# Patient Record
Sex: FEMALE | Race: BLACK OR AFRICAN AMERICAN | Employment: OTHER | URBAN - METROPOLITAN AREA
[De-identification: names, ages, dates, MRNs, and addresses within clinical notes are randomized per-mention and may not be internally consistent; named-entity substitution may affect disease eponyms.]

---

## 2018-07-09 ENCOUNTER — OFFICE VISIT (OUTPATIENT)
Dept: FAMILY MEDICINE CLINIC | Age: 78
End: 2018-07-09

## 2018-07-09 VITALS
HEART RATE: 76 BPM | RESPIRATION RATE: 18 BRPM | SYSTOLIC BLOOD PRESSURE: 122 MMHG | DIASTOLIC BLOOD PRESSURE: 84 MMHG | HEIGHT: 61 IN | OXYGEN SATURATION: 98 % | BODY MASS INDEX: 35.8 KG/M2 | TEMPERATURE: 98.2 F | WEIGHT: 189.6 LBS

## 2018-07-09 DIAGNOSIS — F32.9 MAJOR DEPRESSIVE DISORDER WITH SINGLE EPISODE, REMISSION STATUS UNSPECIFIED: ICD-10-CM

## 2018-07-09 DIAGNOSIS — I10 ESSENTIAL HYPERTENSION: ICD-10-CM

## 2018-07-09 DIAGNOSIS — R60.0 BILATERAL LEG EDEMA: ICD-10-CM

## 2018-07-09 DIAGNOSIS — T43.505A NEUROLEPTIC-INDUCED TARDIVE DYSKINESIA: ICD-10-CM

## 2018-07-09 DIAGNOSIS — M47.816 OSTEOARTHRITIS OF LUMBAR SPINE, UNSPECIFIED SPINAL OSTEOARTHRITIS COMPLICATION STATUS: ICD-10-CM

## 2018-07-09 DIAGNOSIS — G24.01 NEUROLEPTIC-INDUCED TARDIVE DYSKINESIA: ICD-10-CM

## 2018-07-09 DIAGNOSIS — K21.9 GASTROESOPHAGEAL REFLUX DISEASE WITHOUT ESOPHAGITIS: ICD-10-CM

## 2018-07-09 DIAGNOSIS — E11.9 TYPE 2 DIABETES MELLITUS WITHOUT COMPLICATION, WITHOUT LONG-TERM CURRENT USE OF INSULIN (HCC): Primary | ICD-10-CM

## 2018-07-09 LAB — HBA1C MFR BLD HPLC: 7.4 %

## 2018-07-09 RX ORDER — ASPIRIN 81 MG/1
TABLET ORAL DAILY
COMMUNITY
End: 2022-03-17

## 2018-07-09 RX ORDER — OMEGA-3-ACID ETHYL ESTERS 1 G/1
2 CAPSULE, LIQUID FILLED ORAL 2 TIMES DAILY
COMMUNITY
End: 2019-02-06 | Stop reason: SDUPTHER

## 2018-07-09 RX ORDER — FELODIPINE 5 MG/1
5 TABLET, EXTENDED RELEASE ORAL DAILY
COMMUNITY
End: 2018-08-08 | Stop reason: SDUPTHER

## 2018-07-09 RX ORDER — OMEPRAZOLE 20 MG/1
20 CAPSULE, DELAYED RELEASE ORAL DAILY
COMMUNITY
End: 2018-08-08 | Stop reason: SDUPTHER

## 2018-07-09 RX ORDER — METFORMIN HYDROCHLORIDE 500 MG/1
TABLET ORAL 2 TIMES DAILY WITH MEALS
COMMUNITY
End: 2018-08-08 | Stop reason: SDUPTHER

## 2018-07-09 RX ORDER — OLANZAPINE 5 MG/1
5 TABLET ORAL 2 TIMES DAILY
COMMUNITY

## 2018-07-09 RX ORDER — NADOLOL 40 MG/1
TABLET ORAL DAILY
COMMUNITY
End: 2018-08-08 | Stop reason: SDUPTHER

## 2018-07-09 RX ORDER — LOSARTAN POTASSIUM 100 MG/1
100 TABLET ORAL DAILY
COMMUNITY
End: 2018-08-08 | Stop reason: SDUPTHER

## 2018-07-09 RX ORDER — VALACYCLOVIR HYDROCHLORIDE 500 MG/1
TABLET, FILM COATED ORAL 2 TIMES DAILY
COMMUNITY
End: 2018-08-08 | Stop reason: SDUPTHER

## 2018-07-09 RX ORDER — PAROXETINE HYDROCHLORIDE 40 MG/1
40 TABLET, FILM COATED ORAL 2 TIMES DAILY
COMMUNITY
End: 2018-11-05

## 2018-07-09 NOTE — PATIENT INSTRUCTIONS
Continue current medications. Further disposition pending lab results if indicated. Elevate the legs above horizontal as often as possible. Avoid salt and prolonged sitting and standing. Consider support hose or compression stockings. Return for 30 minute follow up in 2 weeks, sooner with any problems. Sign release for medical records from [facility]CP and [facility] Well Visit, Over 72: Care Instructions Your Care Instructions Physical exams can help you stay healthy. Your doctor has checked your overall health and may have suggested ways to take good care of yourself. He or she also may have recommended tests. At home, you can help prevent illness with healthy eating, regular exercise, and other steps. Follow-up care is a key part of your treatment and safety. Be sure to make and go to all appointments, and call your doctor if you are having problems. It's also a good idea to know your test results and keep a list of the medicines you take. How can you care for yourself at home? · Reach and stay at a healthy weight. This will lower your risk for many problems, such as obesity, diabetes, heart disease, and high blood pressure. · Get at least 30 minutes of exercise on most days of the week. Walking is a good choice. You also may want to do other activities, such as running, swimming, cycling, or playing tennis or team sports. · Do not smoke. Smoking can make health problems worse. If you need help quitting, talk to your doctor about stop-smoking programs and medicines. These can increase your chances of quitting for good. · Protect your skin from too much sun. When you're outdoors from 10 a.m. to 4 p.m., stay in the shade or cover up with clothing and a hat with a wide brim. Wear sunglasses that block UV rays. Even when it's cloudy, put broad-spectrum sunscreen (SPF 30 or higher) on any exposed skin. · See a dentist one or two times a year for checkups and to have your teeth cleaned.  
· Wear a seat belt in the car. · Limit alcohol to 2 drinks a day for men and 1 drink a day for women. Too much alcohol can cause health problems. Follow your doctor's advice about when to have certain tests. These tests can spot problems early. For men and women · Cholesterol. Your doctor will tell you how often to have this done based on your overall health and other things that can increase your risk for heart attack and stroke. · Blood pressure. Have your blood pressure checked during a routine doctor visit. Your doctor will tell you how often to check your blood pressure based on your age, your blood pressure results, and other factors. · Diabetes. Ask your doctor whether you should have tests for diabetes. · Vision. Experts recommend that you have yearly exams for glaucoma and other age-related eye problems. · Hearing. Tell your doctor if you notice any change in your hearing. You can have tests to find out how well you hear. · Colon cancer tests. Keep having colon cancer tests as your doctor recommends. You can have one of several types of tests. · Heart attack and stroke risk. At least every 4 to 6 years, you should have your risk for heart attack and stroke assessed. Your doctor uses factors such as your age, blood pressure, cholesterol, and whether you smoke or have diabetes to show what your risk for a heart attack or stroke is over the next 10 years. · Osteoporosis. Talk to your doctor about whether you should have a bone density test to find out whether you have thinning bones. Also ask your doctor about whether you should take calcium and vitamin D supplements. For women · Pap test and pelvic exam. You may no longer need a Pap test. Talk with your doctor about whether to stop or continue to have Pap tests. · Breast exam and mammogram. Ask how often you should have a mammogram, which is an X-ray of your breasts.  A mammogram can spot breast cancer before it can be felt and when it is easiest to treat. 
· Thyroid disease. Talk to your doctor about whether to have your thyroid checked as part of a regular physical exam. Women have an increased chance of a thyroid problem. For men · Prostate exam. Talk to your doctor about whether you should have a blood test (called a PSA test) for prostate cancer. Experts disagree on whether men should have this test. Some experts recommend that you discuss the benefits and risks of the test with your doctor. · Abdominal aortic aneurysm. Ask your doctor whether you should have a test to check for an aneurysm. You may need a test if you ever smoked or if your parent, brother, sister, or child has had an aneurysm. When should you call for help? Watch closely for changes in your health, and be sure to contact your doctor if you have any problems or symptoms that concern you. Where can you learn more? Go to http://levy-tasha.info/. Enter V874 in the search box to learn more about \"Well Visit, Over 65: Care Instructions. \" Current as of: May 12, 2017 Content Version: 11.4 © 9756-4947 [employer]. Care instructions adapted under license by [employer] (which disclaims liability or warranty for this information). If you have questions about a medical condition or this instruction, always ask your healthcare professional. [address] any warranty or liability for your use of this information. Low Sodium Diet (2,000 Milligram): Care Instructions Your Care Instructions Too much sodium causes your body to hold on to extra water. This can raise your blood pressure and force your heart and kidneys to work harder. In very serious cases, this could cause you to be put in the hospital. It might even be life-threatening. By limiting sodium, you will feel better and lower your risk of serious problems. The most common source of sodium is salt.  People get most of the salt in their diet from canned, prepared, and packaged foods. Fast food and restaurant meals also are very high in sodium. Your doctor will probably limit your sodium to less than 2,000 milligrams (mg) a day. This limit counts all the sodium in prepared and packaged foods and any salt you add to your food. Follow-up care is a key part of your treatment and safety. Be sure to make and go to all appointments, and call your doctor if you are having problems. It's also a good idea to know your test results and keep a list of the medicines you take. How can you care for yourself at home? Read food labels · Read labels on cans and food packages. The labels tell you how much sodium is in each serving. Make sure that you look at the serving size. If you eat more than the serving size, you have eaten more sodium. · Food labels also tell you the Percent Daily Value for sodium. Choose products with low Percent Daily Values for sodium. · Be aware that sodium can come in forms other than salt, including monosodium glutamate (MSG), sodium citrate, and sodium bicarbonate (baking soda). MSG is often added to Asian food. When you eat out, you can sometimes ask for food without MSG or added salt. Buy low-sodium foods · Buy foods that are labeled \"unsalted\" (no salt added), \"sodium-free\" (less than 5 mg of sodium per serving), or \"low-sodium\" (less than 140 mg of sodium per serving). Foods labeled \"reduced-sodium\" and \"light sodium\" may still have too much sodium. Be sure to read the label to see how much sodium you are getting. · Buy fresh vegetables, or frozen vegetables without added sauces. Buy low-sodium versions of canned vegetables, soups, and other canned goods. Prepare low-sodium meals · Cut back on the amount of salt you use in cooking. This will help you adjust to the taste. Do not add salt after cooking. One teaspoon of salt has about 2,300 mg of sodium. · Take the salt shaker off the table.  
· Flavor your food with garlic, lemon juice, onion, vinegar, herbs, and spices. Do not use soy sauce, lite soy sauce, steak sauce, onion salt, garlic salt, celery salt, mustard, or ketchup on your food. · Use low-sodium salad dressings, sauces, and ketchup. Or make your own salad dressings and sauces without adding salt. · Use less salt (or none) when recipes call for it. You can often use half the salt a recipe calls for without losing flavor. Other foods such as rice, pasta, and grains do not need added salt. · Rinse canned vegetables, and cook them in fresh water. This removes some-but not all-of the salt. · Avoid water that is naturally high in sodium or that has been treated with water softeners, which add sodium. Call your local water company to find out the sodium content of your water supply. If you buy bottled water, read the label and choose a sodium-free brand. Avoid high-sodium foods · Avoid eating: ¨ Smoked, cured, salted, and canned meat, fish, and poultry. ¨ Ham, guo, hot dogs, and luncheon meats. ¨ Regular, hard, and processed cheese and regular peanut butter. ¨ Crackers with salted tops, and other salted snack foods such as pretzels, chips, and salted popcorn. ¨ Frozen prepared meals, unless labeled low-sodium. ¨ Canned and dried soups, broths, and bouillon, unless labeled sodium-free or low-sodium. ¨ Canned vegetables, unless labeled sodium-free or low-sodium. ¨ Western Sary fries, pizza, tacos, and other fast foods. ¨ Pickles, olives, ketchup, and other condiments, especially soy sauce, unless labeled sodium-free or low-sodium. Where can you learn more? Go to http://levy-tasha.info/. Enter F420 in the search box to learn more about \"Low Sodium Diet (2,000 Milligram): Care Instructions. \" Current as of: May 12, 2017 Content Version: 11.4 © 3992-6795 [employer]. Care instructions adapted under license by [employer] (which disclaims liability or warranty for this information).  If you have questions about a medical condition or this instruction, always ask your healthcare professional. [address] any warranty or liability for your use of this information. Leg and Ankle Edema: Care Instructions Your Care Instructions Swelling in the legs, ankles, and feet is called edema. It is common after you sit or stand for a while. Long plane flights or car rides often cause swelling in the legs and feet. You may also have swelling if you have to stand for long periods of time at your job. Problems with the veins in the legs (varicose veins) and changes in hormones can also cause swelling. Sometimes the swelling in the ankles and feet is caused by a more serious problem, such as heart failure, infection, blood clots, or liver or kidney disease. Follow-up care is a key part of your treatment and safety. Be sure to make and go to all appointments, and call your doctor if you are having problems. It's also a good idea to know your test results and keep a list of the medicines you take. How can you care for yourself at home? · If your doctor gave you medicine, take it as prescribed. Call your doctor if you think you are having a problem with your medicine. · Whenever you are resting, raise your legs up. Try to keep the swollen area higher than the level of your heart. · Take breaks from standing or sitting in one position. ¨ Walk around to increase the blood flow in your lower legs. ¨ Move your feet and ankles often while you stand, or tighten and relax your leg muscles. · Wear support stockings. Put them on in the morning, before swelling gets worse. · Eat a balanced diet. Lose weight if you need to. · Limit the amount of salt (sodium) in your diet. Salt holds fluid in the body and may increase swelling. When should you call for help? Call 911 anytime you think you may need emergency care.  For example, call if: 
? · You have symptoms of a blood clot in your lung (called a pulmonary embolism). These may include: 
¨ Sudden chest pain. ¨ Trouble breathing. ¨ Coughing up blood. ?Call your doctor now or seek immediate medical care if: 
? · You have signs of a blood clot, such as: 
¨ Pain in your calf, back of the knee, thigh, or groin. ¨ Redness and swelling in your leg or groin. ? · You have symptoms of infection, such as: 
¨ Increased pain, swelling, warmth, or redness. ¨ Red streaks or pus. ¨ A fever. ? Watch closely for changes in your health, and be sure to contact your doctor if: 
? · Your swelling is getting worse. ? · You have new or worsening pain in your legs. ? · You do not get better as expected. Where can you learn more? Go to http://levy-tasha.info/. Enter Z170 in the search box to learn more about \"Leg and Ankle Edema: Care Instructions. \" Current as of: March 20, 2017 Content Version: 11.4 © 0415-8405 [employer]. Care instructions adapted under license by [facility] (which disclaims liability or warranty for this information). If you have questions about a medical condition or this instruction, always ask your healthcare professional. [place] [address] any warranty or liability for your use of this information.

## 2018-07-09 NOTE — PROGRESS NOTES
HISTORY OF PRESENT ILLNESS  Ron Collazo is a 69 y.o. female. Establish Care   The history is provided by the patient. Pertinent negatives include no chest pain, no abdominal pain, no headaches and no shortness of breath. Past Medical History:   Diagnosis Date    Delusion ([facility] [address]     Depression     Diabetes ([facility] [address]     type 2    GERD (gastroesophageal reflux disease)     Hypertension        Past Surgical History:   Procedure Laterality Date    HX BACK SURGERY      lower back surgery, done last year    HX  SECTION      HX TONSILLECTOMY         Family History   Problem Relation Age of Onset    Hypertension Father     Cancer Neg Hx     Diabetes Neg Hx        History   Smoking Status    Former Smoker   Smokeless Tobacco    Former User     Comment: quit 34 years ago     History   Alcohol Use No     Health Maintenance Review:  Colonoscopy - 3-4 years ago  Mammogram - 2018, normal  Pap Smear - N/A  Dexa Scan -  Glaucoma Screen - 2018    Immunizations:  Tetanus - ? Pneumococcal - Reported current   PCV-13 -   PCV-23 -  Influenza - N/A  HZV - Declines        Review of Systems   Constitutional: Negative for chills, fever and weight loss. HENT: Negative for hearing loss. Eyes: Negative for blurred vision and double vision. Wears corrective lenses   Respiratory: Negative for cough, shortness of breath and wheezing. Cardiovascular: Positive for leg swelling (3-4 weeks). Negative for chest pain, palpitations, orthopnea and PND. Gastrointestinal: Negative for abdominal pain, blood in stool, constipation, diarrhea, heartburn, melena, nausea and vomiting. Esophageal rupture during dilatation   Genitourinary: Positive for frequency. Negative for dysuria, flank pain, hematuria and urgency. Musculoskeletal: Positive for back pain (chronic LBP - sees Dr. Johnnie Hodgkins, [facility] pain medication). Negative for joint pain and myalgias. Skin: Negative for itching and rash.    Neurological: Negative for dizziness, tingling, sensory change, focal weakness and headaches. MARGRET on CPAP   Endo/Heme/Allergies: Negative for environmental allergies. Psychiatric/Behavioral: Positive for depression (doing well on current meds). Negative for suicidal ideas. The patient is not nervous/anxious and does not have insomnia. Visit Vitals    /84 (BP 1 Location: Left arm, BP Patient Position: Sitting)    Pulse 76    Temp 98.2 °F (36.8 °C) (Oral)    Resp 18    Ht 5' 1.1\" (1.552 m)    Wt 189 lb 9.6 oz (86 kg)    SpO2 98%    BMI 35.71 kg/m2       Physical Exam   Constitutional: She is oriented to person, place, and time. She appears well-developed and well-nourished. HENT:   Head: Normocephalic. Right Ear: Tympanic membrane and ear canal normal.   Left Ear: Tympanic membrane and ear canal normal.   Mouth/Throat: Oropharynx is clear and moist.   Eyes: Conjunctivae and EOM are normal. Pupils are equal, round, and reactive to light. Neck: Neck supple. Cardiovascular: Normal rate, regular rhythm, normal heart sounds and intact distal pulses. Pulmonary/Chest: Effort normal and breath sounds normal.   Abdominal: Soft. Bowel sounds are normal. She exhibits no mass. There is no tenderness. Musculoskeletal: She exhibits edema (1+ pitting edema, bilateral distal LE, R>L). Neurological: She is alert and oriented to person, place, and time. She has normal reflexes. Skin: Skin is warm and dry. Psychiatric: She has a normal mood and affect. Her behavior is normal.   Nursing note and vitals reviewed.          Diabetic foot exam performed by Esperanza Reyes MD     Measurement  Response Nurse Comment Physician Comment   Monofilament  R - normal sensation with micro filament  L - normal sensation with micro filament     Pulse DP R - 2+ (normal)  L - 2+ (normal)     Pulse TP R - 2+ (normal)  L - 2+ (normal)     Structural deformity R - None  L - None     Skin Integrity / Deformity R - None  L - None 7/9/2018  9:42 AM  Component Results   Component Value Flag Ref Range Units Status   Hemoglobin A1c (POC) 7.4   % Final         Depression/Tardive Dyskinesia followed by psychiatry  Chronic lumbar pain followed by pain management  ASSESSMENT and PLAN    ICD-10-CM ICD-9-CM    1. Type 2 diabetes mellitus without complication, without long-term current use of insulin (HCC) E11.9 250.00 AMB POC HEMOGLOBIN A1C      HM DIABETES FOOT EXAM      CANCELED: MICROALBUMIN, UR, RAND W/ MICROALB/CREAT RATIO   2. Essential hypertension I10 401.9 CANCELED: LIPID PANEL      CANCELED: METABOLIC PANEL, COMPREHENSIVE   3. Bilateral leg edema R60.0 782.3 CANCELED: TSH 3RD GENERATION      CANCELED: CBC WITH AUTOMATED DIFF   4. Gastroesophageal reflux disease without esophagitis K21.9 530.81    5. Major depressive disorder with single episode, remission status unspecified F32.9 296.20    6. 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 333.85     T43.505A E939.3    7. Osteoarthritis of lumbar spine, unspecified spinal osteoarthritis complication status F31.139 721.3    Hypertension well controlled  Type 2 diabetes adequately controlled  Avoid dietary starch and sugar and follow a program of regular aerobic exercise. Continue current medications. Further disposition pending lab results if indicated. Elevate the legs above horizontal as often as possible. Avoid salt and prolonged sitting and standing. Consider support hose or compression stockings. Return for 30 minute follow up in 2 weeks, sooner with any problems.   Sign release for medical records from [facility]CP [facility]

## 2018-07-09 NOTE — PROGRESS NOTES
Aleksey Bright is a 69 y.o. female here to establish care       Aleksey Bright is a 69 y.o. female (: 1940) presenting to address:    Chief Complaint   Patient presents with   [facility].     pt here to establish care. pt reports swelling of both feet for 3-4 weeks        Vitals:    18 0917   BP: 122/84   Pulse: 76   Resp: 18   SpO2: 98%   Weight: 189 lb 9.6 oz (86 kg)   Height: 5' 1.1\" (1.552 m)   PainSc:   3   PainLoc: Foot       Hearing/Vision:   No exam data present    Learning Assessment:     Learning Assessment 2018   PRIMARY LEARNER Patient   HIGHEST LEVEL OF EDUCATION - PRIMARY LEARNER  4 YEARS OF COLLEGE   BARRIERS PRIMARY LEARNER NONE   CO-LEARNER CAREGIVER No   PRIMARY LANGUAGE ENGLISH   LEARNER PREFERENCE PRIMARY READING   ANSWERED BY patient   RELATIONSHIP SELF     Depression Screening:     PHQ over the last two weeks 2018   Little interest or pleasure in doing things Not at all   Feeling down, depressed or hopeless Not at all   Total Score PHQ 2 0     Fall Risk Assessment:     Fall Risk Assessment, last 12 mths 2018   Able to walk? Yes   Fall in past 12 months? No     Abuse Screening:   No flowsheet data found. Coordination of Care Questionaire:   1. Have you been to the ER, urgent care clinic since your last visit? Hospitalized since your last visit? NO    2. Have you seen or consulted any other health care providers outside of the [address] [place] [address] since your last visit? Include any pap smears or colon screening. NO    Advanced Directive:   1. Do you have an Advanced Directive? NO    2. Would you like information on Advanced Directives?  NO

## 2018-07-09 NOTE — MR AVS SNAPSHOT
[address] [place] [address] [id number] [facility] [id number] [phone] Patient: Zarina Whaley MRN: [id number] LZL:[id number] Visit Information Date & Time Provider Department Dept. Phone Encounter #  
 7/9/2018  9:00 AM Isaura Jay, [employer] [phone] [id number] Follow-up Instructions Return in about 2 weeks (around 7/23/2018) for 30 minute follow up appointmen. Upcoming Health Maintenance Date Due DTaP/Tdap/Td series (1 - Tdap) 9/17/1962 ZOSTER VACCINE AGE 60> 7/17/2001 GLAUCOMA SCREENING Q2Y 9/17/2006 Bone Densitometry (Dexa) Screening 9/17/2006 Pneumococcal 65+ Low/Medium Risk (1 of 2 - PCV13) 9/17/2006 MEDICARE YEARLY EXAM 7/6/2018 Influenza Age 5 to Adult 8/1/2018 Allergies as of 7/9/2018  Review Complete On: 7/9/2018 By: Isaura Jay MD  
 No Known Allergies Current Immunizations  Never Reviewed No immunizations on file. Not reviewed this visit You Were Diagnosed With   
  
 Codes Comments Type 2 diabetes mellitus without complication, without long-term current use of insulin ([facility])    -  Primary ICD-10-CM: E11.9 ICD-9-CM: 250.00 Essential hypertension     ICD-10-CM: I10 
ICD-9-CM: 401.9 Bilateral leg edema     ICD-10-CM: R60.0 ICD-9-CM: 782.3 Gastroesophageal reflux disease without esophagitis     ICD-10-CM: K21.9 ICD-9-CM: 530.81 Major depressive disorder with single episode, remission status unspecified     ICD-10-CM: F32.9 ICD-9-CM: 296.20 Neuroleptic-induced tardive dyskinesia     ICD-10-CM: G24.01, T43.505A ICD-9-CM: 333.85, E939.3 Vitals BP Pulse Temp Resp Height(growth percentile) Weight(growth percentile) 122/84 (BP 1 Location: Left arm, BP Patient Position: Sitting) 76 98.2 °F (36.8 °C) (Oral) 18 5' 1.1\" (1.552 m) 189 lb 9.6 oz (86 kg) SpO2 BMI OB Status Smoking Status 98% 35.71 kg/m2 Postmenopausal Former Smoker Vitals History BMI and BSA Data Body Mass Index Body Surface Area 35.71 kg/m 2 1.93 m 2 Preferred Pharmacy Pharmacy Name Phone [facility], [place] [address] [phone] Your Updated Medication List  
  
   
This list is accurate as of 7/9/18  9:48 AM.  Always use your most recent med list.  
  
  
  
  
 aspirin delayed-release 81 mg tablet Take  by mouth daily. AUSTEDO 12 mg Tab Generic drug:  deutetrabenazine Take 12 mg by mouth two (2) times a day. felodipine 5 mg 24 hr tablet Commonly known as:  PLENDIL SR Take 5 mg by mouth daily. losartan 100 mg tablet Commonly known as:  COZAAR Take 100 mg by mouth daily. metFORMIN 500 mg tablet Commonly known as:  GLUCOPHAGE Take  by mouth two (2) times daily (with meals). nadolol 40 mg tablet Commonly known as:  CORGARD Take  by mouth daily. OLANZapine 5 mg tablet Commonly known as:  ZyPREXA Take  by mouth two (2) times a day. omega-3 acid ethyl esters 1 gram capsule Commonly known as:  [place] Take 2 g by mouth two (2) times a day. omeprazole 20 mg capsule Commonly known as:  PRILOSEC Take 20 mg by mouth daily. PARoxetine 40 mg tablet Commonly known as:  PAXIL Take 40 mg by mouth two (2) times a day. valACYclovir 500 mg tablet Commonly known as:  VALTREX Take  by mouth two (2) times a day. We Performed the Following AMB POC HEMOGLOBIN A1C [81875 CPT(R)] Follow-up Instructions Return in about 2 weeks (around 7/23/2018) for 30 minute follow up appointmen. To-Do List   
 07/09/2018 Lab:  CBC WITH AUTOMATED DIFF   
  
 07/09/2018 Lab:  LIPID PANEL   
  
 07/09/2018 Lab:  METABOLIC PANEL, COMPREHENSIVE   
  
 07/09/2018 Lab:  MICROALBUMIN, UR, RAND W/ MICROALB/CREAT RATIO   
  
 07/09/2018 Lab:  TSH 3RD GENERATION Patient Instructions Continue current medications. Further disposition pending lab results if indicated. Elevate the legs above horizontal as often as possible. Avoid salt and prolonged sitting and standing. Consider support hose or compression stockings. Return for 30 minute follow up in 2 weeks, sooner with any problems. Sign release for medical records from PCP and Eye Doctor Well Visit, Over 72: Care Instructions Your Care Instructions Physical exams can help you stay healthy. Your doctor has checked your overall health and may have suggested ways to take good care of yourself. He or she also may have recommended tests. At home, you can help prevent illness with healthy eating, regular exercise, and other steps. Follow-up care is a key part of your treatment and safety. Be sure to make and go to all appointments, and call your doctor if you are having problems. It's also a good idea to know your test results and keep a list of the medicines you take. How can you care for yourself at home? · Reach and stay at a healthy weight. This will lower your risk for many problems, such as obesity, diabetes, heart disease, and high blood pressure. · Get at least 30 minutes of exercise on most days of the week. Walking is a good choice. You also may want to do other activities, such as running, swimming, cycling, or playing tennis or team sports. · Do not smoke. Smoking can make health problems worse. If you need help quitting, talk to your doctor about stop-smoking programs and medicines. These can increase your chances of quitting for good. · Protect your skin from too much sun. When you're outdoors from 10 a.m. to 4 p.m., stay in the shade or cover up with clothing and a hat with a wide brim. Wear sunglasses that block UV rays. Even when it's cloudy, put broad-spectrum sunscreen (SPF 30 or higher) on any exposed skin. · See a dentist one or two times a year for checkups and to have your teeth cleaned. · Wear a seat belt in the car. · Limit alcohol to 2 drinks a day for men and 1 drink a day for women. Too much alcohol can cause health problems. Follow your doctor's advice about when to have certain tests. These tests can spot problems early. For men and women · Cholesterol. Your doctor will tell you how often to have this done based on your overall health and other things that can increase your risk for heart attack and stroke. · Blood pressure. Have your blood pressure checked during a routine doctor visit. Your doctor will tell you how often to check your blood pressure based on your age, your blood pressure results, and other factors. · Diabetes. Ask your doctor whether you should have tests for diabetes. · Vision. Experts recommend that you have yearly exams for glaucoma and other age-related eye problems. · Hearing. Tell your doctor if you notice any change in your hearing. You can have tests to find out how well you hear. · Colon cancer tests. Keep having colon cancer tests as your doctor recommends. You can have one of several types of tests. · Heart attack and stroke risk. At least every 4 to 6 years, you should have your risk for heart attack and stroke assessed. Your doctor uses factors such as your age, blood pressure, cholesterol, and whether you smoke or have diabetes to show what your risk for a heart attack or stroke is over the next 10 years. · Osteoporosis. Talk to your doctor about whether you should have a bone density test to find out whether you have thinning bones. Also ask your doctor about whether you should take calcium and vitamin D supplements. For women · Pap test and pelvic exam. You may no longer need a Pap test. Talk with your doctor about whether to stop or continue to have Pap tests.  
· Breast exam and mammogram. Ask how often you should have a mammogram, which is an X-ray of your breasts. A mammogram can spot breast cancer before it can be felt and when it is easiest to treat. · Thyroid disease. Talk to your doctor about whether to have your thyroid checked as part of a regular physical exam. Women have an increased chance of a thyroid problem. For men · Prostate exam. Talk to your doctor about whether you should have a blood test (called a PSA test) for prostate cancer. Experts disagree on whether men should have this test. Some experts recommend that you discuss the benefits and risks of the test with your doctor. · Abdominal aortic aneurysm. Ask your doctor whether you should have a test to check for an aneurysm. You may need a test if you ever smoked or if your parent, brother, sister, or child has had an aneurysm. When should you call for help? Watch closely for changes in your health, and be sure to contact your doctor if you have any problems or symptoms that concern you. Where can you learn more? Go to http://levy-tasha.info/. Enter M551 in the search box to learn more about \"Well Visit, Over 65: Care Instructions. \" Current as of: May 12, 2017 Content Version: 11.4 © 9484-3159 [employer]. Care instructions adapted under license by [employer] (which disclaims liability or warranty for this information). If you have questions about a medical condition or this instruction, always ask your healthcare professional. [place] [address] any warranty or liability for your use of this information. Low Sodium Diet (2,000 Milligram): Care Instructions Your Care Instructions Too much sodium causes your body to hold on to extra water. This can raise your blood pressure and force your heart and kidneys to work harder. In very serious cases, this could cause you to be put in the hospital. It might even be life-threatening.  By limiting sodium, you will feel better and lower your risk of serious problems. The most common source of sodium is salt. People get most of the salt in their diet from canned, prepared, and packaged foods. Fast food and restaurant meals also are very high in sodium. Your doctor will probably limit your sodium to less than 2,000 milligrams (mg) a day. This limit counts all the sodium in prepared and packaged foods and any salt you add to your food. Follow-up care is a key part of your treatment and safety. Be sure to make and go to all appointments, and call your doctor if you are having problems. It's also a good idea to know your test results and keep a list of the medicines you take. How can you care for yourself at home? Read food labels · Read labels on cans and food packages. The labels tell you how much sodium is in each serving. Make sure that you look at the serving size. If you eat more than the serving size, you have eaten more sodium. · Food labels also tell you the Percent Daily Value for sodium. Choose products with low Percent Daily Values for sodium. · Be aware that sodium can come in forms other than salt, including monosodium glutamate (MSG), sodium citrate, and sodium bicarbonate (baking soda). MSG is often added to Asian food. When you eat out, you can sometimes ask for food without MSG or added salt. Buy low-sodium foods · Buy foods that are labeled \"unsalted\" (no salt added), \"sodium-free\" (less than 5 mg of sodium per serving), or \"low-sodium\" (less than 140 mg of sodium per serving). Foods labeled \"reduced-sodium\" and \"light sodium\" may still have too much sodium. Be sure to read the label to see how much sodium you are getting. · Buy fresh vegetables, or frozen vegetables without added sauces. Buy low-sodium versions of canned vegetables, soups, and other canned goods. Prepare low-sodium meals · Cut back on the amount of salt you use in cooking.  This will help you adjust to the taste. Do not add salt after cooking. One teaspoon of salt has about 2,300 mg of sodium. · Take the salt shaker off the table. · Flavor your food with garlic, lemon juice, onion, vinegar, herbs, and spices. Do not use soy sauce, lite soy sauce, steak sauce, onion salt, garlic salt, celery salt, mustard, or ketchup on your food. · Use low-sodium salad dressings, sauces, and ketchup. Or make your own salad dressings and sauces without adding salt. · Use less salt (or none) when recipes call for it. You can often use half the salt a recipe calls for without losing flavor. Other foods such as rice, pasta, and grains do not need added salt. · Rinse canned vegetables, and cook them in fresh water. This removes some-but not all-of the salt. · Avoid water that is naturally high in sodium or that has been treated with water softeners, which add sodium. Call your local water company to find out the sodium content of your water supply. If you buy bottled water, read the label and choose a sodium-free brand. Avoid high-sodium foods · Avoid eating: ¨ Smoked, cured, salted, and canned meat, fish, and poultry. ¨ Ham, guo, hot dogs, and luncheon meats. ¨ Regular, hard, and processed cheese and regular peanut butter. ¨ Crackers with salted tops, and other salted snack foods such as pretzels, chips, and salted popcorn. ¨ Frozen prepared meals, unless labeled low-sodium. ¨ Canned and dried soups, broths, and bouillon, unless labeled sodium-free or low-sodium. ¨ Canned vegetables, unless labeled sodium-free or low-sodium. ¨ [place] fries, pizza, tacos, and other fast foods. ¨ Pickles, olives, ketchup, and other condiments, especially soy sauce, unless labeled sodium-free or low-sodium. Where can you learn more? Go to http://levy-tasha.info/. Enter A429 in the search box to learn more about \"Low Sodium Diet (2,000 Milligram): Care Instructions. \" Current as of: May 12, 2017 Content Version: 11.4 © 0716-8451 [employer]. Care instructions adapted under license by [employer] (which disclaims liability or warranty for this information). If you have questions about a medical condition or this instruction, always ask your healthcare professional. [address] any warranty or liability for your use of this information. Leg and Ankle Edema: Care Instructions Your Care Instructions Swelling in the legs, ankles, and feet is called edema. It is common after you sit or stand for a while. Long plane flights or car rides often cause swelling in the legs and feet. You may also have swelling if you have to stand for long periods of time at your job. Problems with the veins in the legs (varicose veins) and changes in hormones can also cause swelling. Sometimes the swelling in the ankles and feet is caused by a more serious problem, such as heart failure, infection, blood clots, or liver or kidney disease. Follow-up care is a key part of your treatment and safety. Be sure to make and go to all appointments, and call your doctor if you are having problems. It's also a good idea to know your test results and keep a list of the medicines you take. How can you care for yourself at home? · If your doctor gave you medicine, take it as prescribed. Call your doctor if you think you are having a problem with your medicine. · Whenever you are resting, raise your legs up. Try to keep the swollen area higher than the level of your heart. · Take breaks from standing or sitting in one position. ¨ Walk around to increase the blood flow in your lower legs. ¨ Move your feet and ankles often while you stand, or tighten and relax your leg muscles. · Wear support stockings. Put them on in the morning, before swelling gets worse. · Eat a balanced diet. Lose weight if you need to. · Limit the amount of salt (sodium) in your diet.  Salt holds fluid in the body and may increase swelling. When should you call for help? Call 911 anytime you think you may need emergency care. For example, call if: 
? · You have symptoms of a blood clot in your lung (called a pulmonary embolism). These may include: 
¨ Sudden chest pain. ¨ Trouble breathing. ¨ Coughing up blood. ?Call your doctor now or seek immediate medical care if: 
? · You have signs of a blood clot, such as: 
¨ Pain in your calf, back of the knee, thigh, or groin. ¨ Redness and swelling in your leg or groin. ? · You have symptoms of infection, such as: 
¨ Increased pain, swelling, warmth, or redness. ¨ Red streaks or pus. ¨ A fever. ? Watch closely for changes in your health, and be sure to contact your doctor if: 
? · Your swelling is getting worse. ? · You have new or worsening pain in your legs. ? · You do not get better as expected. Where can you learn more? Go to http://levy-tasha.info/. Enter S141 in the search box to learn more about \"Leg and Ankle Edema: Care Instructions. \" Current as of: March 20, 2017 Content Version: 11.4 © 4986-5718 [employer]. Care instructions adapted under license by [employer] (which disclaims liability or warranty for this information). If you have questions about a medical condition or this instruction, always ask your healthcare professional. [place] [address] any warranty or liability for your use of this information. Introducing [facility]! [facility] introduces MX Logic patient portal. Now you can access parts of your medical record, email your doctor's office, and request medication refills online. 1. In your internet browser, go to [email or web address]. [employer] 2. Click on the First Time User? Click Here link in the Sign In box. You will see the New Member Sign Up page. 3. Enter your MX Logic Access Code exactly as it appears below.  You will not need to use this code after youve completed the sign-up process. If you do not sign up before the expiration date, you must request a new code. · [employer] Access Code: [email or web address] Expires: 10/7/2018  9:48 AM 
 
4. Enter the last four digits of your Social Security Number (xxxx) and Date of Birth (mm/dd/yyyy) as indicated and click Submit. You will be taken to the next sign-up page. 5. Create a Room [employer] ID. This will be your [employer] login ID and cannot be changed, so think of one that is secure and easy to remember. 6. Create a [employer] password. You can change your password at any time. 7. Enter your Password Reset Question and Answer. This can be used at a later time if you forget your password. 8. Enter your e-mail address. You will receive e-mail notification when new information is available in [address]. 9. Click Sign Up. You can now view and download portions of your medical record. 10. Click the Download Summary menu link to download a portable copy of your medical information. If you have questions, please visit the Frequently Asked Questions section of the [employer] website. Remember, [employer] is NOT to be used for urgent needs. For medical emergencies, dial 911. Now available from your iPhone and Android! Please provide this summary of care documentation to your next provider. Your primary care clinician is listed as Antonino Rico. If you have any questions after today's visit, please call [phone].

## 2018-07-11 ENCOUNTER — HOSPITAL ENCOUNTER (OUTPATIENT)
Dept: LAB | Age: 78
Discharge: HOME | End: 2018-07-11
Payer: MEDICARE

## 2018-07-11 DIAGNOSIS — F32.9 MAJOR DEPRESSIVE DISORDER WITH SINGLE EPISODE, REMISSION STATUS UNSPECIFIED: ICD-10-CM

## 2018-07-11 DIAGNOSIS — E11.9 TYPE 2 DIABETES MELLITUS WITHOUT COMPLICATION, WITHOUT LONG-TERM CURRENT USE OF INSULIN (HCC): ICD-10-CM

## 2018-07-11 DIAGNOSIS — I10 ESSENTIAL HYPERTENSION: ICD-10-CM

## 2018-07-11 LAB
ALBUMIN SERPL-MCNC: 3.7 G/DL (ref 3.4–5)
ALBUMIN/GLOB SERPL: 0.8 {RATIO} (ref 0.8–1.7)
ALP SERPL-CCNC: 150 U/L (ref 45–117)
ALT SERPL-CCNC: 50 U/L (ref 13–56)
ANION GAP SERPL CALC-SCNC: 9 MMOL/L (ref 3–18)
AST SERPL-CCNC: 34 U/L (ref 15–37)
BILIRUB SERPL-MCNC: 0.3 MG/DL (ref 0.2–1)
BUN SERPL-MCNC: 15 MG/DL (ref 7–18)
BUN/CREAT SERPL: 16 (ref 12–20)
CALCIUM SERPL-MCNC: 8.5 MG/DL (ref 8.5–10.1)
CHLORIDE SERPL-SCNC: 103 MMOL/L (ref 100–108)
CHOLEST SERPL-MCNC: 194 MG/DL
CO2 SERPL-SCNC: 27 MMOL/L (ref 21–32)
CREAT SERPL-MCNC: 0.93 MG/DL (ref 0.6–1.3)
CREAT UR-MCNC: 99.09 MG/DL (ref 30–125)
GLOBULIN SER CALC-MCNC: 4.4 G/DL (ref 2–4)
GLUCOSE SERPL-MCNC: 110 MG/DL (ref 74–99)
HDLC SERPL-MCNC: 108 MG/DL (ref 40–60)
HDLC SERPL: 1.8 {RATIO} (ref 0–5)
LDLC SERPL CALC-MCNC: 62 MG/DL (ref 0–100)
LIPID PROFILE,FLP: ABNORMAL
MICROALBUMIN UR-MCNC: 6.8 MG/DL (ref 0–3)
MICROALBUMIN/CREAT UR-RTO: 69 MG/G (ref 0–30)
POTASSIUM SERPL-SCNC: 4.2 MMOL/L (ref 3.5–5.5)
PROT SERPL-MCNC: 8.1 G/DL (ref 6.4–8.2)
SODIUM SERPL-SCNC: 139 MMOL/L (ref 136–145)
TRIGL SERPL-MCNC: 120 MG/DL (ref ?–150)
TSH SERPL DL<=0.05 MIU/L-ACNC: 0.94 UIU/ML (ref 0.36–3.74)
VLDLC SERPL CALC-MCNC: 24 MG/DL

## 2018-07-11 PROCEDURE — 82043 UR ALBUMIN QUANTITATIVE: CPT | Performed by: FAMILY MEDICINE

## 2018-07-11 PROCEDURE — 80061 LIPID PANEL: CPT | Performed by: FAMILY MEDICINE

## 2018-07-11 PROCEDURE — 84443 ASSAY THYROID STIM HORMONE: CPT | Performed by: FAMILY MEDICINE

## 2018-07-11 PROCEDURE — 80053 COMPREHEN METABOLIC PANEL: CPT | Performed by: FAMILY MEDICINE

## 2018-07-11 PROCEDURE — 36415 COLL VENOUS BLD VENIPUNCTURE: CPT | Performed by: FAMILY MEDICINE

## 2018-07-23 ENCOUNTER — OFFICE VISIT (OUTPATIENT)
Dept: FAMILY MEDICINE CLINIC | Age: 78
End: 2018-07-23

## 2018-07-23 VITALS
WEIGHT: 188.8 LBS | TEMPERATURE: 98.3 F | RESPIRATION RATE: 16 BRPM | HEART RATE: 72 BPM | HEIGHT: 61 IN | BODY MASS INDEX: 35.65 KG/M2 | SYSTOLIC BLOOD PRESSURE: 122 MMHG | DIASTOLIC BLOOD PRESSURE: 70 MMHG | OXYGEN SATURATION: 98 %

## 2018-07-23 DIAGNOSIS — I87.2 VENOUS INSUFFICIENCY: ICD-10-CM

## 2018-07-23 DIAGNOSIS — E11.21 TYPE 2 DIABETES MELLITUS WITH DIABETIC NEPHROPATHY, WITHOUT LONG-TERM CURRENT USE OF INSULIN (HCC): Primary | ICD-10-CM

## 2018-07-23 DIAGNOSIS — I10 ESSENTIAL HYPERTENSION: ICD-10-CM

## 2018-07-23 DIAGNOSIS — E66.01 SEVERE OBESITY (BMI 35.0-39.9): ICD-10-CM

## 2018-07-23 NOTE — PROGRESS NOTES
HISTORY OF PRESENT ILLNESS  Zarina Whaley is a 69 y.o. female. HPI Comments: Ms. Omar Kelsey is concerned about bthe number of medications prescribed and would like to review them. Diabetes   The history is provided by the patient and medical records. This is a chronic problem. Associated symptoms include abdominal pain. Pertinent negatives include no chest pain and no shortness of breath. Patient Active Problem List   Diagnosis Code    Type 2 diabetes mellitus without complication, [facility] long-term [facility] E11.9    Essential hypertension I10    Major depressive disorder with single episode F32.9    Gastroesophageal reflux disease without esophagitis K21.9    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, T43.505A    Osteoarthritis of lumbar spine M47.816    Type 2 diabetes with nephropathy ([facility]) E11.21    Severe obesity (BMI 35.0-39.9) ([facility]) E66.01       Current Outpatient Prescriptions:     metFORMIN (GLUCOPHAGE) 500 mg tablet, Take  by mouth two (2) times daily (with meals). , Disp: , Rfl:     omeprazole (PRILOSEC) 20 mg capsule, Take 20 mg by mouth daily. , Disp: , Rfl:     losartan (COZAAR) 100 mg tablet, Take 100 mg by mouth daily. , Disp: , Rfl:     valACYclovir (VALTREX) 500 mg tablet, Take  by mouth two (2) times a day., Disp: , Rfl:     aspirin delayed-release 81 mg tablet, Take  by mouth daily. , Disp: , Rfl:     nadolol (CORGARD) 40 mg tablet, Take  by mouth daily. , Disp: , Rfl:     felodipine (PLENDIL SR) 5 mg 24 hr tablet, Take 5 mg by mouth daily. , Disp: , Rfl:     PARoxetine (PAXIL) 40 mg tablet, Take 40 mg by mouth two (2) times a day., Disp: , Rfl:     OLANZapine (ZYPREXA) 5 mg tablet, Take  by mouth two (2) times a day., Disp: , Rfl:     deutetrabenazine (AUSTEDO) 12 mg tab, Take 12 mg by mouth two (2) times a day., Disp: , Rfl:     omega-3 acid ethyl esters (LOVAZA) 1 gram capsule, Take 2 g by mouth two (2) times a day., Disp: , Rfl:     No Known Allergies      Review of Systems   Constitutional: Negative for fever and weight loss. Respiratory: Negative for shortness of breath. Cardiovascular: Negative for chest pain and palpitations. Gastrointestinal: Positive for abdominal pain. Musculoskeletal: Positive for back pain (chronic, better after injection). Neurological: Negative for tingling. Visit Vitals    /70 (BP 1 Location: Left arm, BP Patient Position: Sitting)    Pulse 72    Temp 98.3 °F (36.8 °C) (Oral)    Resp 16    Ht 5' 1.1\" (1.552 m)    Wt 188 lb 12.8 oz (85.6 kg)    SpO2 98%    BMI 35.56 kg/m2       Physical Exam   Constitutional: She is oriented to person, place, and time. She appears well-developed and well-nourished. HENT:   Head: Normocephalic. Eyes: Conjunctivae and EOM are normal.   Neck: Neck supple. Cardiovascular: Normal rate, regular rhythm and normal heart sounds. Pulmonary/Chest: Effort normal and breath sounds normal.   Musculoskeletal: She exhibits edema (trace to 1+ right pedal, none left pedal). Neurological: She is alert and oriented to person, place, and time. Skin: Skin is warm and dry. Psychiatric: She has a normal mood and affect. Her behavior is normal.   Nursing note and vitals reviewed. ASSESSMENT and PLAN    ICD-10-CM ICD-9-CM    1. Type 2 diabetes mellitus with diabetic nephropathy, without long-term current use of insulin ([facility]) E11.21 250.40      583.81    2. Essential hypertension I10 401.9    3. Venous insufficiency I87.2 459.81    4. Severe obesity (BMI 35.0-39.9) ([facility]) E66.01 278.01    All medications reviewed, patient indicates that she understands the reason for each and feels comfortable taking them, recent lab also reviewed. Continue current medications. Avoid dietary starch and sugar and follow a program of regular aerobic exercise. Elevate the legs above horizontal as often as possible. Avoid salt and prolonged sitting and standing.  Consider support hose or compression stockings. Check on when Prevnar and Pneumovax immunizations were given need dates if possible  Return for follow up in 3 months, A1c at follow up return, sooner with any problems.

## 2018-07-23 NOTE — PATIENT INSTRUCTIONS
Continue current medications. Avoid dietary starch and sugar and follow a program of regular aerobic exercise. Elevate the legs above horizontal as often as possible. Avoid salt and prolonged sitting and standing. Consider support hose or compression stockings. Check on when Prevnar and Pneumovax immunizations were given need dates if possible  Ask eye doctor's office to send a copy of most recent diabetic eye exam.  Return for follow up in 3 months, A1c at follow up return, sooner with any problems.

## 2018-07-23 NOTE — MR AVS SNAPSHOT
[address] [place] [address]  [address] [facility] [id number] [phone] Patient: Ervin Millan MRN: [id number] WPR:[birth year] Visit Information Date & Time Provider Department Dept. Phone Encounter #  
 7/23/2018  9:00 AM Mingotyree Millan [address] [phone] [id number] Upcoming Health Maintenance Date Due  
 EYE EXAM RETINAL OR DILATED Q1 9/17/1951 DTaP/Tdap/Td series (1 - Tdap) 9/17/1962 ZOSTER VACCINE AGE 60> 7/17/2001 GLAUCOMA SCREENING Q2Y 9/17/2006 Bone Densitometry (Dexa) Screening 9/17/2006 Pneumococcal 65+ Low/Medium Risk (1 of 2 - PCV13) 9/17/2006 MEDICARE YEARLY EXAM 7/6/2018 Influenza Age 5 to Adult 8/1/2018 HEMOGLOBIN A1C Q6M 1/9/2019 FOOT EXAM Q1 7/9/2019 MICROALBUMIN Q1 7/11/2019 LIPID PANEL Q1 7/11/2019 Allergies as of 7/23/2018  Review Complete On: 7/23/2018 By: Maryan Millan MD  
 No Known Allergies Current Immunizations  Never Reviewed No immunizations on file. Not reviewed this visit You Were Diagnosed With   
  
 Codes Comments Type 2 diabetes mellitus with diabetic nephropathy, without long-term current use of insulin (HCC)    -  Primary ICD-10-CM: E11.21 
ICD-9-CM: 250.40, 583.81 Essential hypertension     ICD-10-CM: I10 
ICD-9-CM: 401.9 Venous insufficiency     ICD-10-CM: I87.2 ICD-9-CM: 459.81 Severe obesity (BMI 35.0-39.9) (HCC)     ICD-10-CM: E66.01 
ICD-9-CM: 278.01 Vitals BP Pulse Temp Resp Height(growth percentile) Weight(growth percentile) 122/70 (BP 1 Location: Left arm, BP Patient Position: Sitting) 72 98.3 °F (36.8 °C) (Oral) 16 5' 1.1\" (1.552 m) 188 lb 12.8 oz (85.6 kg) SpO2 BMI OB Status Smoking Status 98% 35.56 kg/m2 Postmenopausal Former Smoker BMI and BSA Data Body Mass Index Body Surface Area 35.56 kg/m 2 1.92 m 2 Preferred Pharmacy Pharmacy Name Phone [address] [place] [address] [phone] Your Updated Medication List  
  
   
This list is accurate as of 7/23/18  9:23 AM.  Always use your most recent med list.  
  
  
  
  
 aspirin delayed-release 81 mg tablet Take  by mouth daily. AUSTEDO 12 mg Tab Generic drug:  deutetrabenazine Take 12 mg by mouth two (2) times a day. felodipine 5 mg 24 hr tablet Commonly known as:  PLENDIL SR Take 5 mg by mouth daily. losartan 100 mg tablet Commonly known as:  COZAAR Take 100 mg by mouth daily. metFORMIN 500 mg tablet Commonly known as:  GLUCOPHAGE Take  by mouth two (2) times daily (with meals). nadolol 40 mg tablet Commonly known as:  CORGARD Take  by mouth daily. OLANZapine 5 mg tablet Commonly known as:  ZyPREXA Take  by mouth two (2) times a day. omega-3 acid ethyl esters 1 gram capsule Commonly known as:  Oanh Maroon Take 2 g by mouth two (2) times a day. omeprazole 20 mg capsule Commonly known as:  PRILOSEC Take 20 mg by mouth daily. PARoxetine 40 mg tablet Commonly known as:  PAXIL Take 40 mg by mouth two (2) times a day. valACYclovir 500 mg tablet Commonly known as:  VALTREX Take  by mouth two (2) times a day. Patient Instructions Continue current medications. Avoid dietary starch and sugar and follow a program of regular aerobic exercise. Elevate the legs above horizontal as often as possible. Avoid salt and prolonged sitting and standing. Consider support hose or compression stockings. Check on when Prevnar and Pneumovax immunizations were given need dates if possible Ask eye doctor's office to send a copy of most recent diabetic eye exam. 
Return for follow up in 3 months, A1c at follow up return, sooner with any problems. Introducing [facility]!    
 [employer] introduces [employer] patient portal. Now you can access parts of your medical record, email your doctor's office, and request medication refills online. 1. In your internet browser, go to [email or web address] 2. Click on the First Time User? Click Here link in the Sign In box. You will see the New Member Sign Up page. 3. Enter your [employer] Access Code exactly as it appears below. You will not need to use this code after youve completed the sign-up process. If you do not sign up before the expiration date, you must request a new code. · [employer] Access Code: [email or web address] Expires: 10/7/2018  9:48 AM 
 
4. Enter the last four digits of your Social Security Number (xxxx) and Date of Birth (mm/dd/yyyy) as indicated and click Submit. You will be taken to the next sign-up page. 5. Create a Funding Profiles ID. This will be your [employer] login ID and cannot be changed, so think of one that is secure and easy to remember. 6. Create a [employer] password. You can change your password at any time. 7. Enter your Password Reset Question and Answer. This can be used at a later time if you forget your password. 8. Enter your e-mail address. You will receive e-mail notification when new information is available in [address] 9. Click Sign Up. You can now view and download portions of your medical record. 10. Click the Download Summary menu link to download a portable copy of your medical information. If you have questions, please visit the Frequently Asked Questions section of the [employer] website. Remember, [employer] is NOT to be used for urgent needs. For medical emergencies, dial 911. Now available from your iPhone and Android! Please provide this summary of care documentation to your next provider. Your primary care clinician is listed as Saqib Chinchilla. If you have any questions after today's visit, please call [phone].

## 2018-07-23 NOTE — PROGRESS NOTES
Darian Mayes is a 69 y.o. female here for follow up    Darian Mayes is a 69 y.o. female (: 1940) presenting to address:    Chief Complaint   Patient presents with    Diabetes     pt here for follow up        Vitals:    18 0905   BP: 122/70   Pulse: 72   Resp: 16   Temp: 98.3 °F (36.8 °C)   TempSrc: Oral   SpO2: 98%   Weight: 188 lb 12.8 oz (85.6 kg)   Height: 5' 1.1\" (1.552 m)   PainSc:   4   PainLoc: Back       Hearing/Vision:   No exam data present    Learning Assessment:     Learning Assessment 2018   PRIMARY LEARNER Patient   HIGHEST LEVEL OF EDUCATION - PRIMARY LEARNER  4 YEARS OF COLLEGE   BARRIERS PRIMARY LEARNER NONE   CO-LEARNER CAREGIVER No   PRIMARY LANGUAGE ENGLISH   LEARNER PREFERENCE PRIMARY READING   ANSWERED BY patient   RELATIONSHIP SELF     Depression Screening:     PHQ over the last two weeks 2018   Little interest or pleasure in doing things Not at all   Feeling down, depressed, irritable, or hopeless Not at all   Total Score PHQ 2 0     Fall Risk Assessment:     Fall Risk Assessment, last 12 mths 2018   Able to walk? Yes   Fall in past 12 months? No     Abuse Screening:   No flowsheet data found. Coordination of Care Questionaire:   1. Have you been to the ER, urgent care clinic since your last visit? Hospitalized since your last visit? NO    2. Have you seen or consulted any other health care providers outside of the [address] [place] [address] since your last visit? Include any pap smears or colon screening. YES Pain doc    Advanced Directive:   1. Do you have an Advanced Directive? NO    2. Would you like information on Advanced Directives?  NO

## 2018-08-08 ENCOUNTER — TELEPHONE (OUTPATIENT)
Dept: FAMILY MEDICINE CLINIC | Age: 78
End: 2018-08-08

## 2018-08-08 DIAGNOSIS — E11.21 TYPE 2 DIABETES MELLITUS WITH DIABETIC NEPHROPATHY, WITHOUT LONG-TERM CURRENT USE OF INSULIN (HCC): Primary | ICD-10-CM

## 2018-08-08 DIAGNOSIS — B00.9 HERPES SIMPLEX: ICD-10-CM

## 2018-08-08 DIAGNOSIS — K21.9 GASTROESOPHAGEAL REFLUX DISEASE WITHOUT ESOPHAGITIS: ICD-10-CM

## 2018-08-08 DIAGNOSIS — I10 ESSENTIAL HYPERTENSION: ICD-10-CM

## 2018-08-08 RX ORDER — OMEPRAZOLE 20 MG/1
20 CAPSULE, DELAYED RELEASE ORAL DAILY
Qty: 90 CAP | Refills: 1 | Status: SHIPPED | OUTPATIENT
Start: 2018-08-08 | End: 2018-11-05

## 2018-08-08 RX ORDER — NADOLOL 40 MG/1
40 TABLET ORAL DAILY
Qty: 90 TAB | Refills: 1 | Status: SHIPPED | OUTPATIENT
Start: 2018-08-08 | End: 2019-02-12 | Stop reason: SDUPTHER

## 2018-08-08 RX ORDER — OMEPRAZOLE 20 MG/1
20 CAPSULE, DELAYED RELEASE ORAL DAILY
Status: CANCELLED | OUTPATIENT
Start: 2018-08-08

## 2018-08-08 RX ORDER — FELODIPINE 5 MG/1
5 TABLET, EXTENDED RELEASE ORAL DAILY
Qty: 30 TAB | Status: CANCELLED | OUTPATIENT
Start: 2018-08-08

## 2018-08-08 RX ORDER — VALACYCLOVIR HYDROCHLORIDE 500 MG/1
TABLET, FILM COATED ORAL 2 TIMES DAILY
Status: CANCELLED | OUTPATIENT
Start: 2018-08-08

## 2018-08-08 RX ORDER — LOSARTAN POTASSIUM 100 MG/1
100 TABLET ORAL DAILY
Status: CANCELLED | OUTPATIENT
Start: 2018-08-08

## 2018-08-08 RX ORDER — LOSARTAN POTASSIUM 100 MG/1
100 TABLET ORAL DAILY
Qty: 90 TAB | Refills: 1 | Status: SHIPPED | OUTPATIENT
Start: 2018-08-08 | End: 2019-06-03 | Stop reason: DRUGHIGH

## 2018-08-08 RX ORDER — METFORMIN HYDROCHLORIDE 500 MG/1
TABLET ORAL 2 TIMES DAILY WITH MEALS
Status: CANCELLED | OUTPATIENT
Start: 2018-08-08

## 2018-08-08 RX ORDER — METFORMIN HYDROCHLORIDE 500 MG/1
500 TABLET ORAL 2 TIMES DAILY WITH MEALS
Qty: 180 TAB | Refills: 1 | Status: SHIPPED | OUTPATIENT
Start: 2018-08-08 | End: 2019-02-06 | Stop reason: SDUPTHER

## 2018-08-08 RX ORDER — FELODIPINE 5 MG/1
5 TABLET, EXTENDED RELEASE ORAL DAILY
Qty: 90 TAB | Refills: 1 | Status: SHIPPED | OUTPATIENT
Start: 2018-08-08 | End: 2019-05-28 | Stop reason: SDUPTHER

## 2018-08-08 RX ORDER — VALACYCLOVIR HYDROCHLORIDE 500 MG/1
500 TABLET, FILM COATED ORAL 2 TIMES DAILY
Qty: 180 TAB | Refills: 1 | Status: SHIPPED | OUTPATIENT
Start: 2018-08-08 | End: 2019-02-06 | Stop reason: SDUPTHER

## 2018-08-08 RX ORDER — NADOLOL 40 MG/1
TABLET ORAL DAILY
Status: CANCELLED | OUTPATIENT
Start: 2018-08-08

## 2018-08-08 NOTE — TELEPHONE ENCOUNTER
Patient came in with a list of medications to have sent over to [address] [place] [address]. Call: [phone] or Fax: [phone]    Meds listed are:   Omeprazole, delayed release capsule 20 mg   Metformin Hydrochloride, 500 mg  Losartin Potassium, 50 mg   Felodipine ER, 5 mg   Nadolol tab, 80 mg   Valacyclovir, 500 mg     Please call pt to follow up.

## 2018-09-19 ENCOUNTER — TELEPHONE (OUTPATIENT)
Dept: FAMILY MEDICINE CLINIC | Age: 78
End: 2018-09-19

## 2018-09-19 DIAGNOSIS — I10 ESSENTIAL HYPERTENSION: ICD-10-CM

## 2018-09-19 DIAGNOSIS — E11.21 TYPE 2 DIABETES MELLITUS WITH DIABETIC NEPHROPATHY, WITHOUT LONG-TERM CURRENT USE OF INSULIN (HCC): Primary | ICD-10-CM

## 2018-09-19 DIAGNOSIS — E66.01 SEVERE OBESITY (BMI 35.0-39.9): ICD-10-CM

## 2018-09-21 ENCOUNTER — HOSPITAL ENCOUNTER (OUTPATIENT)
Dept: NUTRITION | Age: 78
Discharge: HOME | End: 2018-09-21
Payer: MEDICARE

## 2018-09-21 PROCEDURE — 97802 MEDICAL NUTRITION INDIV IN: CPT

## 2018-09-24 NOTE — PROGRESS NOTES
[address] [place] [address] 51, 45 [facility], [place], 70 [facility]  Phone: [phone]  Fax: [phone]   Nutrition Assessment - Medical Nutrition Therapy   Outpatient Initial Evaluation         Patient Name: Kun Weston : 1940   Treatment Diagnosis: Diabetes; obesity   Referral Source: Brian Harvey MD Start of [facility]): 2018     Gender: female Age: 69 y.o. Ht: 62 in Wt:  189.8 lb  kg   BMI: 34.7 BMR   Male  BMR Female 1299     Past Medical History includes: HTN     Pertinent Medications:   Metformin 500mg q d     Biochemical Data:   Lab Results   Component Value Date/Time    Hemoglobin A1c (POC) 7.4 2018 09:42 AM     Lab Results   Component Value Date/Time    Cholesterol, total 194 2018 10:43 AM    HDL Cholesterol 108 (H) 2018 10:43 AM    LDL, calculated 62 2018 10:43 AM    VLDL, calculated 24 2018 10:43 AM    Triglyceride 120 2018 10:43 AM    CHOL/HDL Ratio 1.8 2018 10:43 AM     Lab Results   Component Value Date/Time    ALT (SGPT) 50 2018 10:43 AM    AST (SGOT) 34 2018 10:43 AM    Alk. phosphatase 150 (H) 2018 10:43 AM    Bilirubin, total 0.3 2018 10:43 AM     Lab Results   Component Value Date/Time    Creatinine 0.93 2018 10:43 AM     Lab Results   Component Value Date/Time    BUN 15 2018 10:43 AM     Lab Results   Component Value Date/Time    Microalbumin/Creat ratio (mg/g creat) 69 (H) 2018 10:43 AM    Microalbumin,urine random 6.80 (H) 2018 10:43 AM        Subjective/Assessment:   Pt stated she has lived with diabetes for 20 years. She has gained about 20 lbs over the past 2 years, due to less activity and increased intake of sweets (per pt). Pt's most recent A1c was 7.4 (2018) and SMFBGs range 107-115. Pt stated she exercises an average of 4hrs/wk on her bike.   She lives with her  and they share the duties of grocery shopping and cooking. Pt is motivated to make the necessary changes in her diet and lifestyle to help her reach her goals. Current Eating Patterns: B- cereal or eggs  L- none or snacks  D- meat + starch + vegetable  Sn- sweet/dessert    Pt's diet is overall high in carbohydrates and low in protein. She admits to having a sweet tooth and does not currently have the discipline to say no to them. Pt is willing to eat most foods and does not have any food allergies or intolerances. The inconsistency in timing and composition of her diet prevents her metabolism from efficiently burning calories each day. Estimate Needs   Calories:  1200 Protein: 90 Carbs: 120 Fat: 40   Kcal/day  g/day  g/day  g/day        percent: 30  40  30               Education & Recommendations provided: Educated pt on the pathophysiology of Type II Diabetes, insulin resistance and the rationale for dietary modifications and increased activity. Educated pt macronutrient composition of various foods and provided specific recommendations for intake at each scheduled meal and snack. Also discussed the importance of establishing a consistent lifestyle and eating schedule to promote a more efficient metabolism.    Handouts Provided: []  Carbohydrates  []  Protein  []  Fiber  []  Serving Sizes  []  Meal and Snack Ideas  []  Food Journals []  Diabetes  []  Cholesterol  []  Sodium  [x]  Meal Builder  [x]  Diet Plan  []  Others:   Information Reviewed with: pt   Readiness to Change Stage: []  Pre-contemplative    []  Contemplative  [x]  Preparation               []  Action                  []  Maintenance   Potential Barriers to Learning: []  Decline in memory    []  Language barrier   []  Other:  []  Emotional                  []  Limited mobility  []  Lack of motivation     [] Vision, hearing or cognitive impairment   Expected Compliance: Good     Nutritional Goal - To promote lifestyle changes to result in:    [x]  Weight loss  [x]  Improved diabetic control  []  Decreased cholesterol levels  [x]  Decreased blood pressure  []  Weight maintenance []  Preventing any interactions associated with food allergies  []  Adequate weight gain toward goal weight  []  Other:        Patient Goals:  SMART goals 1. Always combine and balance carbohydrate and protein        2. Eat 2-3 hrs after snacks and 4-5 hrs after meals  3.  Check BG before and 2hrs after eating 1 meal each day     Dietitian Signature: Braeden Hightower MS, RD, CSSD Date: 9/24/2018   Follow-up: Fri Oct 19 at 11:30 Time: 10:00 AM

## 2018-11-05 ENCOUNTER — OFFICE VISIT (OUTPATIENT)
Dept: FAMILY MEDICINE CLINIC | Age: 78
End: 2018-11-05

## 2018-11-05 VITALS
WEIGHT: 188.4 LBS | HEART RATE: 71 BPM | SYSTOLIC BLOOD PRESSURE: 112 MMHG | BODY MASS INDEX: 35.57 KG/M2 | OXYGEN SATURATION: 98 % | TEMPERATURE: 97.7 F | HEIGHT: 61 IN | RESPIRATION RATE: 18 BRPM | DIASTOLIC BLOOD PRESSURE: 49 MMHG

## 2018-11-05 DIAGNOSIS — E11.21 TYPE 2 DIABETES MELLITUS WITH DIABETIC NEPHROPATHY, WITHOUT LONG-TERM CURRENT USE OF INSULIN (HCC): ICD-10-CM

## 2018-11-05 DIAGNOSIS — Z01.818 PREOP EXAMINATION: Primary | ICD-10-CM

## 2018-11-05 DIAGNOSIS — M50.90 CERVICAL DISC DISEASE: ICD-10-CM

## 2018-11-05 DIAGNOSIS — I10 ESSENTIAL HYPERTENSION: ICD-10-CM

## 2018-11-05 LAB — HBA1C MFR BLD HPLC: 7.1 %

## 2018-11-05 NOTE — PROGRESS NOTES
Zev Mathews is a 69 y.o. female (: 1940) presenting to address: Chief Complaint Patient presents with  Pre-op Exam  
  Here for pre op exam.   
 
 
There were no vitals filed for this visit. Hearing/Vision: No exam data present Learning Assessment:  
 
Learning Assessment 2018 PRIMARY LEARNER Patient HIGHEST LEVEL OF EDUCATION - PRIMARY LEARNER  4 YEARS OF COLLEGE  
BARRIERS PRIMARY LEARNER NONE  
CO-LEARNER CAREGIVER No  
PRIMARY LANGUAGE ENGLISH  
LEARNER PREFERENCE PRIMARY READING  
ANSWERED BY patient RELATIONSHIP SELF Depression Screening: PHQ over the last two weeks 2018 Little interest or pleasure in doing things Not at all Feeling down, depressed, irritable, or hopeless Not at all Total Score PHQ 2 0 Fall Risk Assessment:  
 
Fall Risk Assessment, last 12 mths 2018 Able to walk? Yes Fall in past 12 months? No  
 
Abuse Screening: No flowsheet data found. Coordination of Care Questionaire: 1. Have you been to the ER, urgent care clinic since your last visit? Hospitalized since your last visit? NO 
 
2. Have you seen or consulted any other health care providers outside of the [address] [place] [address] since your last visit? Include any pap smears or colon screening. Yes Advanced Directive: 1. Do you have an Advanced Directive? NO 
 
2. Would you like information on Advanced Directives?  NO

## 2018-11-05 NOTE — PROGRESS NOTES
HISTORY OF PRESENT ILLNESS Filippo Liang is a 69 y.o. female. Pre-op Exam  
The history is provided by the patient and medical records. Pertinent negatives include no chest pain, no abdominal pain, no headaches and no shortness of breath. Ms. Jasson Adames is scheduled for A.C.D.F. 2018 Past Medical History:  
Diagnosis Date  Delusion ([facility]  Depression  Diabetes ([facility] type 2  
 GERD (gastroesophageal reflux disease)  Hypertension Past Surgical History:  
Procedure Laterality Date  HX BACK SURGERY    
 lower back surgery, done last year  HX  SECTION    
 HX TONSILLECTOMY Family History Problem Relation Age of Onset  Hypertension Father  Cancer Neg Hx  Diabetes Neg Hx No Known Allergies Patient Active Problem List  
Diagnosis Code  Type 2 diabetes mellitus with diabetic nephropathy, without long-term current use of insulin ([facility]) E11.21  
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 9) E66.01  
 
 
 
Current Outpatient Medications:  
  metFORMIN (GLUCOPHAGE) 500 mg tablet, Take 1 Tab by mouth two (2) times daily (with meals). , Disp: 180 Tab, Rfl: 1 
  omeprazole (PRILOSEC) 20 mg capsule, Take 1 Cap by mouth daily. , Disp: 90 Cap, Rfl: 1 
  losartan (COZAAR) 100 mg tablet, Take 1 Tab by mouth daily. , Disp: 90 Tab, Rfl: 1   valACYclovir (VALTREX) 500 mg tablet, Take 1 Tab by mouth two (2) times a day., Disp: 180 Tab, Rfl: 1 
  nadolol (CORGARD) 40 mg tablet, Take 1 Tab by mouth daily. , Disp: 90 Tab, Rfl: 1 
  felodipine (PLENDIL SR) 5 mg 24 hr tablet, Take 1 Tab by mouth daily. , Disp: 90 Tab, Rfl: 1 
  aspirin delayed-release 81 mg tablet, Take  by mouth daily. , Disp: , Rfl:  
   PARoxetine (PAXIL) 40 mg tablet, Take 40 mg by mouth two (2) times a day., Disp: , Rfl:  
  OLANZapine (ZYPREXA) 5 mg tablet, Take  by mouth two (2) times a day., Disp: , Rfl:  
  deutetrabenazine (AUSTEDO) 12 mg tab, Take 12 mg by mouth two (2) times a day., Disp: , Rfl:  
  omega-3 acid ethyl esters (LOVAZA) 1 gram capsule, Take 2 g by mouth two (2) times a day., Disp: , Rfl:  
 
Social History Tobacco Use Smoking Status Former Smoker Smokeless Tobacco Former User Tobacco Comment  
 quit 34 years ago Social History Substance and Sexual Activity Alcohol Use No  
 
Immunization History Administered Date(s) Administered  Zoster Recombinant 07/23/2018 Review of Systems Constitutional: Negative for chills, fever and weight loss. HENT: Negative for congestion, ear pain and sore throat. Eyes: Negative for blurred vision and double vision. Respiratory: Negative for cough, shortness of breath and wheezing. Cardiovascular: Negative for chest pain, palpitations and leg swelling. Gastrointestinal: Positive for diarrhea (off and on - not acute). Negative for abdominal pain, blood in stool, heartburn, melena, nausea and vomiting. Genitourinary: Negative for dysuria and urgency. Musculoskeletal: Positive for neck pain. Negative for joint pain and myalgias. Skin: Negative for itching and rash. Neurological: Negative for dizziness, tingling, sensory change, focal weakness and headaches. Psychiatric/Behavioral: Negative for depression. The patient is not nervous/anxious and does not have insomnia. Visit Vitals /49 (BP 1 Location: Left arm, BP Patient Position: Sitting) Pulse 71 Temp 97.7 °F (36.5 °C) (Oral) Resp 18 Ht 5' 1\" (1.549 m) Wt 188 lb 6.4 oz (85.5 kg) SpO2 98% BMI 35.60 kg/m² Physical Exam  
Constitutional: She is oriented to person, place, and time. She appears well-developed and well-nourished. HENT:  
Head: Normocephalic. Right Ear: Tympanic membrane and ear canal normal.  
Left Ear: Tympanic membrane and ear canal normal.  
Mouth/Throat: Oropharynx is clear and moist.  
Eyes: Conjunctivae and EOM are normal. Pupils are equal, round, and reactive to light. Neck: Neck supple. Cardiovascular: Normal rate, regular rhythm, normal heart sounds and intact distal pulses. Pulmonary/Chest: Effort normal and breath sounds normal.  
Abdominal: Soft. Bowel sounds are normal. She exhibits no mass. There is no tenderness. Musculoskeletal: She exhibits no edema. Neurological: She is alert and oriented to person, place, and time. Skin: Skin is warm and dry. Psychiatric: She has a normal mood and affect. Her behavior is normal.  
Nursing note and vitals reviewed. Date: 11/5/2018 Department: [employer] Ordering/Authorizing: Karen Carlisle MD  
Component Value Flag Ref Range Units Status Hemoglobin A1c (POC) 7.1    % Final  
 
Results for Ramesh Pisano (MRN [id number]) as of 11/5/2018 12:36 Ref. Range 11/3/2018 09:39 WBC x 10*3 Latest Ref Range: 4.0 - 11.0 K/uL 7.0  
RBC x 10*6 Latest Ref Range: 3.80 - 5.20 M/uL 4.27 HGB Latest Ref Range: 11.7 - 16.1 g/dL 12.0 HCT Latest Ref Range: 35.1 - 48.3 % 37.2 MCV Latest Ref Range: 80 - 95 fL 87 MCH Latest Ref Range: 26 - 34 pg 28 MCHC Latest Ref Range: 31 - 36 g/dL 32 RDW Latest Ref Range: 10.0 - 15.5 % 15.9 (H) Platelet Latest Ref Range: 140 - 440 K/uL 451 (H) MPV Latest Ref Range: 9.0 - 13.0 fL 10.1 Glucose  Latest Ref Range: 70 - 99 mg/dL 143 (H) BUN Latest Ref Range: 6 - 22 mg/dL 14 CREATININE Latest Ref Range: 0.8 - 1.4 mg/dL 0.6 (L)  
eGFR African American Latest Ref Range: >60.0  >60.0  
eGFR Non African American Latest Ref Range: >60.0  >60.0 SODIUM Latest Ref Range: 133 - 145 mmol/L 144 Potassium Latest Ref Range: 3.5 - 5.5 mmol/L 4.4 CHLORIDE Latest Ref Range: 98 - 110 mmol/L 103 CO2 Latest Ref Range: 20 - 32 mmol/L 26  
 CALCIUM Latest Ref Range: 8.4 - 10.5 mg/dL 9.4 ANION GAP Latest Units: mmol/L 15.4 EKG and Chest X-ray reviewed, no significant abnormalities noted. ASSESSMENT and PLAN 
  ICD-10-CM ICD-9-CM 1. Preop examination Z01.818 V72.84   
2. Cervical disc disease M50.90 722.91   
3. Type 2 diabetes mellitus with diabetic nephropathy, without long-term current use of insulin (HCC) E11.21 250.40 AMB POC HEMOGLOBIN A1C  
  583.81   
4. Essential hypertension I10 401.9 No medical contraindications to the planned procedure, medically creared for surgery.

## 2018-11-16 ENCOUNTER — OFFICE VISIT (OUTPATIENT)
Dept: FAMILY MEDICINE CLINIC | Age: 78
End: 2018-11-16

## 2018-11-16 VITALS
TEMPERATURE: 98.7 F | HEIGHT: 61 IN | HEART RATE: 67 BPM | RESPIRATION RATE: 16 BRPM | SYSTOLIC BLOOD PRESSURE: 124 MMHG | BODY MASS INDEX: 34.59 KG/M2 | DIASTOLIC BLOOD PRESSURE: 70 MMHG | WEIGHT: 183.2 LBS | OXYGEN SATURATION: 99 %

## 2018-11-16 DIAGNOSIS — K92.89 GASTROINTESTINAL IRRITATION: Primary | ICD-10-CM

## 2018-11-16 NOTE — PROGRESS NOTES
HISTORY OF PRESENT ILLNESS Brent Bender is a 69 y.o. female. Ms. Esteban Mason is 8 days S/P ACDF-concerned because she elected not to take Cascara, Colace, Hydrocodone and ondansetron prescribed at discharge following surgery Nausea The history is provided by the patient and medical records. This is a new problem. Episode onset: following surgery. Associated symptoms include diarrhea (about 2 soft stools per day). Pertinent negatives include no fever, no abdominal pain, no headaches and no headaches. Patient Active Problem List  
Diagnosis Code  Type 2 diabetes mellitus with diabetic nephropathy, without long-term current use of insulin ([facility]) E11.21  
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 9) E66.01  
 
 
Current Outpatient Medications:  
  metFORMIN (GLUCOPHAGE) 500 mg tablet, Take 1 Tab by mouth two (2) times daily (with meals). , Disp: 180 Tab, Rfl: 1 
  losartan (COZAAR) 100 mg tablet, Take 1 Tab by mouth daily. , Disp: 90 Tab, Rfl: 1   valACYclovir (VALTREX) 500 mg tablet, Take 1 Tab by mouth two (2) times a day., Disp: 180 Tab, Rfl: 1 
  nadolol (CORGARD) 40 mg tablet, Take 1 Tab by mouth daily. , Disp: 90 Tab, Rfl: 1 
  felodipine (PLENDIL SR) 5 mg 24 hr tablet, Take 1 Tab by mouth daily. , Disp: 90 Tab, Rfl: 1 
  aspirin delayed-release 81 mg tablet, Take  by mouth daily. , Disp: , Rfl:  
  OLANZapine (ZYPREXA) 5 mg tablet, Take  by mouth two (2) times a day., Disp: , Rfl:  
  deutetrabenazine (AUSTEDO) 12 mg tab, Take 12 mg by mouth two (2) times a day., Disp: , Rfl:  
  omega-3 acid ethyl esters (LOVAZA) 1 gram capsule, Take 2 g by mouth two (2) times a day., Disp: , Rfl:  
 
Allergies Allergen Reactions  Gabapentin Other (comments) Confusion  Resperal-Dm [Brompheniramine-Pseudoeph-Dm] Other (comments) Difficulty standing  Zocor [Simvastatin] Other (comments) Muscle pain Review of Systems Constitutional: Negative for fever. Respiratory: Negative for shortness of breath. Cardiovascular: Negative for chest pain and palpitations. Gastrointestinal: Positive for diarrhea (about 2 soft stools per day) and nausea (almeida decreased appetite than nausea). Negative for abdominal pain and vomiting. Genitourinary: Negative for dysuria and urgency. Musculoskeletal: Positive for joint pain (radicular pain to right shoulder pain resolved as a result of surgery ). Neurological: Negative for dizziness, tingling, focal weakness and headaches. Visit Vitals /70 (BP 1 Location: Left arm, BP Patient Position: Sitting) Pulse 67 Temp 98.7 °F (37.1 °C) (Oral) Resp 16 Ht 5' 1\" (1.549 m) Wt 183 lb 3.2 oz (83.1 kg) SpO2 99% BMI 34.62 kg/m² Physical Exam  
Constitutional: She is oriented to person, place, and time. She appears well-developed and well-nourished. HENT:  
Head: Normocephalic. Eyes: Conjunctivae and EOM are normal.  
Neck: Neck supple. Cardiovascular: Normal rate, regular rhythm and normal heart sounds. Pulmonary/Chest: Effort normal and breath sounds normal.  
Abdominal: Soft. There is no tenderness. Musculoskeletal: She exhibits no edema. Neurological: She is alert and oriented to person, place, and time. Skin: Skin is warm and dry. Psychiatric: She has a normal mood and affect. Her behavior is normal.  
Nursing note and vitals reviewed. ASSESSMENT and PLAN 
  ICD-10-CM ICD-9-CM 1. Gastrointestinal irritation K92.89 536.9 Reassured that those medications do not appear to be needed unless she is actually nauseated in which case ondansetron might be helpful. Maintain hydration with small amounts of fluid frequently.  Avoid citrus, dairy, caffeine, tomato, alcohol and NSAIDs. Advance the diet by including bland foods like toast, oatmeal, eggs, soup etc. As soon as possible. Follow up for new symptoms, worsening symptoms or failure to improve. Schedule a diabetic eye exam, have results sent here.

## 2018-11-16 NOTE — PATIENT INSTRUCTIONS
Maintain hydration with small amounts of fluid frequently. Avoid citrus, dairy, caffeine, tomato, alcohol and NSAIDs. Advance the diet by including bland foods like toast, oatmeal, eggs, soup etc. As soon as possible. Follow up for new symptoms, worsening symptoms or failure to improve. Schedule a diabetic eye exam, have results sent here. Nausea and Vomiting: Care Instructions Your Care Instructions When you are nauseated, you may feel weak and sweaty and notice a lot of saliva in your mouth. Nausea often leads to vomiting. Most of the time you do not need to worry about nausea and vomiting, but they can be signs of other illnesses. Two common causes of nausea and vomiting are stomach flu and food poisoning. Nausea and vomiting from viral stomach flu will usually start to improve within 24 hours. Nausea and vomiting from food poisoning may last from 12 to 48 hours. The doctor has checked you carefully, but problems can develop later. If you notice any problems or new symptoms, get medical treatment right away. Follow-up care is a key part of your treatment and safety. Be sure to make and go to all appointments, and call your doctor if you are having problems. It's also a good idea to know your test results and keep a list of the medicines you take. How can you care for yourself at home? · To prevent dehydration, drink plenty of fluids, enough so that your urine is light yellow or clear like water. Choose water and other caffeine-free clear liquids until you feel better. If you have kidney, heart, or liver disease and have to limit fluids, talk with your doctor before you increase the amount of fluids you drink. · Rest in bed until you feel better. · When you are able to eat, try clear soups, mild foods, and liquids until all symptoms are gone for 12 to 48 hours. Other good choices include dry toast, crackers, cooked cereal, and gelatin dessert, such as Jell-O. When should you call for help? Call 911 anytime you think you may need emergency care. For example, call if: 
  · You passed out (lost consciousness).  
 Call your doctor now or seek immediate medical care if: 
  · You have symptoms of dehydration, such as: 
? Dry eyes and a dry mouth. ? Passing only a little dark urine. ? Feeling thirstier than usual.  
  · You have new or worsening belly pain.  
  · You have a new or higher fever.  
  · You vomit blood or what looks like coffee grounds.  
 Watch closely for changes in your health, and be sure to contact your doctor if: 
  · You have ongoing nausea and vomiting.  
  · Your vomiting is getting worse.  
  · Your vomiting lasts longer than 2 days.  
  · You are not getting better as expected. Where can you learn more? Go to http://levy-tasha.info/. Enter 25 [id number] in the search box to learn more about \"Nausea and Vomiting: Care Instructions. \" Current as of: November 20, 2017 Content Version: 11.8 © 4136-8417 Healthwise, Incorporated. Care instructions adapted under license by [employer] (which disclaims liability or warranty for this information). If you have questions about a medical condition or this instruction, always ask your healthcare professional. [place] [address] any warranty or liability for your use of this information.

## 2018-11-16 NOTE — PROGRESS NOTES
Sonido Anne is a 69 y.o. female (: 1940) presenting to address: Chief Complaint Patient presents with  Surgical Follow-up Here for post op. States that she has been having diarrhea and nausea. There were no vitals filed for this visit. Hearing/Vision: No exam data present Learning Assessment:  
 
Learning Assessment 2018 PRIMARY LEARNER Patient HIGHEST LEVEL OF EDUCATION - PRIMARY LEARNER  4 YEARS OF COLLEGE  
BARRIERS PRIMARY LEARNER NONE  
CO-LEARNER CAREGIVER No  
PRIMARY LANGUAGE ENGLISH  
LEARNER PREFERENCE PRIMARY READING  
ANSWERED BY patient RELATIONSHIP SELF Depression Screening: PHQ over the last two weeks 2018 Little interest or pleasure in doing things Not at all Feeling down, depressed, irritable, or hopeless Not at all Total Score PHQ 2 0 Fall Risk Assessment:  
 
Fall Risk Assessment, last 12 mths 2018 Able to walk? Yes Fall in past 12 months? No  
 
Abuse Screening: No flowsheet data found. Coordination of Care Questionaire: 1. Have you been to the ER, urgent care clinic since your last visit? Hospitalized since your last visit? NO 
 
2. Have you seen or consulted any other health care providers outside of the [address] [place] [address] since your last visit? Include any pap smears or colon screening. YES Advanced Directive: 1. Do you have an Advanced Directive? NO 
 
2. Would you like information on Advanced Directives?  NO

## 2018-11-27 ENCOUNTER — OFFICE VISIT (OUTPATIENT)
Dept: FAMILY MEDICINE CLINIC | Age: 78
End: 2018-11-27

## 2018-11-27 VITALS
DIASTOLIC BLOOD PRESSURE: 68 MMHG | TEMPERATURE: 98.1 F | SYSTOLIC BLOOD PRESSURE: 134 MMHG | RESPIRATION RATE: 16 BRPM | BODY MASS INDEX: 34.51 KG/M2 | OXYGEN SATURATION: 97 % | WEIGHT: 182.8 LBS | HEIGHT: 61 IN | HEART RATE: 78 BPM

## 2018-11-27 DIAGNOSIS — R19.5 LOOSE STOOLS: Primary | ICD-10-CM

## 2018-11-27 RX ORDER — LOPERAMIDE HCL 2 MG
2 TABLET ORAL
Qty: 20 TAB | Refills: 1 | Status: SHIPPED | OUTPATIENT
Start: 2018-11-27 | End: 2018-12-07

## 2018-11-27 NOTE — PROGRESS NOTES
HISTORY OF PRESENT ILLNESS Zev Mathews is a 69 y.o. female. Diarrhea The history is provided by the patient and medical records. This is a new problem. Pertinent negatives include no abdominal pain, no vomiting and no chills. Patient Active Problem List  
Diagnosis Code  Type 2 diabetes mellitus with diabetic nephropathy, without long-term current use of insulin ([facility]) E11.21  
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 9) E66.01  
 
 
Current Outpatient Medications:  
  metFORMIN (GLUCOPHAGE) 500 mg tablet, Take 1 Tab by mouth two (2) times daily (with meals). , Disp: 180 Tab, Rfl: 1 
  losartan (COZAAR) 100 mg tablet, Take 1 Tab by mouth daily. , Disp: 90 Tab, Rfl: 1   valACYclovir (VALTREX) 500 mg tablet, Take 1 Tab by mouth two (2) times a day., Disp: 180 Tab, Rfl: 1 
  nadolol (CORGARD) 40 mg tablet, Take 1 Tab by mouth daily. , Disp: 90 Tab, Rfl: 1 
  felodipine (PLENDIL SR) 5 mg 24 hr tablet, Take 1 Tab by mouth daily. , Disp: 90 Tab, Rfl: 1 
  aspirin delayed-release 81 mg tablet, Take  by mouth daily. , Disp: , Rfl:  
  OLANZapine (ZYPREXA) 5 mg tablet, Take  by mouth two (2) times a day., Disp: , Rfl:  
  deutetrabenazine (AUSTEDO) 12 mg tab, Take 12 mg by mouth two (2) times a day., Disp: , Rfl:  
  omega-3 acid ethyl esters (LOVAZA) 1 gram capsule, Take 2 g by mouth two (2) times a day., Disp: , Rfl:  
 
Allergies Allergen Reactions  Gabapentin Other (comments) Confusion  Resperal-Dm [Brompheniramine-Pseudoeph-Dm] Other (comments) Difficulty standing  Zocor [Simvastatin] Other (comments) Muscle pain Review of Systems Constitutional: Negative for chills, fever and weight loss. Cardiovascular: Negative for chest pain and palpitations. Gastrointestinal: Positive for diarrhea (loose stools (2/day), since orthopaedic surgery 3 weeks ago). Negative for abdominal pain, blood in stool, nausea and vomiting. Genitourinary: Negative for dysuria, frequency and urgency. Patient has spoken with her surgeon's office and stool studies were recommended. She specifically requests a stool culture. Visit Vitals /68 (BP 1 Location: Left arm, BP Patient Position: Sitting) Pulse 78 Temp 98.1 °F (36.7 °C) (Oral) Resp 16 Ht 5' 1\" (1.549 m) Wt 182 lb 12.8 oz (82.9 kg) SpO2 97% BMI 34.54 kg/m² Physical Exam  
Constitutional: She is oriented to person, place, and time. She appears well-developed and well-nourished. HENT:  
Head: Normocephalic. Eyes: Conjunctivae and EOM are normal.  
Neck: Neck supple. Cardiovascular: Normal rate, regular rhythm and normal heart sounds. Pulmonary/Chest: Effort normal and breath sounds normal.  
Abdominal: Soft. Bowel sounds are normal. There is tenderness (mild epigastric). There is no rebound and no guarding. Musculoskeletal: She exhibits no edema. Neurological: She is alert and oriented to person, place, and time. Skin: Skin is warm and dry. Psychiatric: She has a normal mood and affect. Her behavior is normal.  
Nursing note and vitals reviewed. ASSESSMENT and PLAN 
  ICD-10-CM ICD-9-CM 1. Loose stools R19.5 787.7 loperamide (IMODIUM A-D) 2 mg tablet CULTURE, STOOL  
   C DIFFICILE, CYTOTOXIN B Maintain hydration with small amounts of fluid frequently. Avoid citrus, dairy, caffeine, tomato, alcohol and NSAIDs. Advance the diet by including bland foods like toast, oatmeal, eggs, soup etc. As soon as possible. Follow up for new symptoms, worsening symptoms or failure to improve. Imodium AD up to 4 x daily as needed Follow up for new symptoms, worsening symptoms or failure to improve. Schedule a diabetic eye exam, have results sent here.

## 2018-11-27 NOTE — PATIENT INSTRUCTIONS
Maintain hydration with small amounts of fluid frequently. Avoid citrus, dairy, caffeine, tomato, alcohol and NSAIDs. Advance the diet by including bland foods like toast, oatmeal, eggs, soup etc. As soon as possible. Follow up for new symptoms, worsening symptoms or failure to improve. Imodium AD up to 4 x daily as needed Follow up for new symptoms, worsening symptoms or failure to improve. Schedule a diabetic eye exam, have results sent here. Diarrhea: Care Instructions Your Care Instructions Diarrhea is loose, watery stools (bowel movements). The exact cause is often hard to find. Sometimes diarrhea is your body's way of getting rid of what caused an upset stomach. Viruses, food poisoning, and many medicines can cause diarrhea. Some people get diarrhea in response to emotional stress, anxiety, or certain foods. Almost everyone has diarrhea now and then. It usually isn't serious, and your stools will return to normal soon. The important thing to do is replace the fluids you have lost, so you can prevent dehydration. The doctor has checked you carefully, but problems can develop later. If you notice any problems or new symptoms, get medical treatment right away. Follow-up care is a key part of your treatment and safety. Be sure to make and go to all appointments, and call your doctor if you are having problems. It's also a good idea to know your test results and keep a list of the medicines you take. How can you care for yourself at home? · Watch for signs of dehydration, which means your body has lost too much water. Dehydration is a serious condition and should be treated right away. Signs of dehydration are: ? Increasing thirst and dry eyes and mouth. ? Feeling faint or lightheaded. ? Darker urine, and a smaller amount of urine than normal. 
· To prevent dehydration, drink plenty of fluids, enough so that your urine is light yellow or clear like water.  Choose water and other caffeine-free clear liquids until you feel better. If you have kidney, heart, or liver disease and have to limit fluids, talk with your doctor before you increase the amount of fluids you drink. · Begin eating small amounts of mild foods the next day, if you feel like it. ? Try yogurt that has live cultures of Lactobacillus. (Check the label.) ? Avoid spicy foods, fruits, alcohol, and caffeine until 48 hours after all symptoms are gone. ? Avoid chewing gum that contains sorbitol. ? Avoid dairy products (except for yogurt with Lactobacillus) while you have diarrhea and for 3 days after symptoms are gone. · The doctor may recommend that you take over-the-counter medicine, such as loperamide (Imodium), if you still have diarrhea after 6 hours. Read and follow all instructions on the label. Do not use this medicine if you have bloody diarrhea, a high fever, or other signs of serious illness. Call your doctor if you think you are having a problem with your medicine. When should you call for help? Call 911 anytime you think you may need emergency care. For example, call if: 
  · You passed out (lost consciousness).  
  · Your stools are maroon or very bloody.  
 Call your doctor now or seek immediate medical care if: 
  · You are dizzy or lightheaded, or you feel like you may faint.  
  · Your stools are black and look like tar, or they have streaks of blood.  
  · You have new or worse belly pain.  
  · You have symptoms of dehydration, such as: 
? Dry eyes and a dry mouth. ? Passing only a little dark urine. ? Feeling thirstier than usual.  
  · You have a new or higher fever.  
 Watch closely for changes in your health, and be sure to contact your doctor if: 
  · Your diarrhea is getting worse.  
  · You see pus in the diarrhea.  
  · You are not getting better after 2 days (48 hours). Where can you learn more? Go to http://levy-tasha.info/. Enter L423 in the search box to learn more about \"Diarrhea: Care Instructions. \" Current as of: November 20, 2017 Content Version: 11.8 © 6956-2290 Healthwise, Incorporated. Care instructions adapted under license by [employer] (which disclaims liability or warranty for this information). If you have questions about a medical condition or this instruction, always ask your healthcare professional. [place] [address] any warranty or liability for your use of this information.

## 2018-11-27 NOTE — PROGRESS NOTES
Blanquita Poole is a 69 y.o. female (: 1940) presenting to address: Chief Complaint Patient presents with  Diarrhea Here for diarrhea and to get a stool culture. There were no vitals filed for this visit. Hearing/Vision: No exam data present Learning Assessment:  
 
Learning Assessment 2018 PRIMARY LEARNER Patient HIGHEST LEVEL OF EDUCATION - PRIMARY LEARNER  4 YEARS OF COLLEGE  
BARRIERS PRIMARY LEARNER NONE  
CO-LEARNER CAREGIVER No  
PRIMARY LANGUAGE ENGLISH  
LEARNER PREFERENCE PRIMARY READING  
ANSWERED BY patient RELATIONSHIP SELF Depression Screening: PHQ over the last two weeks 2018 Little interest or pleasure in doing things Not at all Feeling down, depressed, irritable, or hopeless Not at all Total Score PHQ 2 0 Fall Risk Assessment:  
 
Fall Risk Assessment, last 12 mths 2018 Able to walk? Yes Fall in past 12 months? No  
 
Abuse Screening: No flowsheet data found. Coordination of Care Questionaire: 1. Have you been to the ER, urgent care clinic since your last visit? Hospitalized since your last visit? NO 
 
2. Have you seen or consulted any other health care providers outside of the [address] [place] [address] since your last visit? Include any pap smears or colon screening. Yes Advanced Directive: 1. Do you have an Advanced Directive? NO 
 
2. Would you like information on Advanced Directives?  NO

## 2018-11-28 ENCOUNTER — HOSPITAL ENCOUNTER (OUTPATIENT)
Dept: LAB | Age: 78
Discharge: HOME | End: 2018-11-28
Payer: MEDICARE

## 2018-11-28 PROCEDURE — 82274 ASSAY TEST FOR BLOOD FECAL: CPT

## 2018-11-30 ENCOUNTER — TELEPHONE (OUTPATIENT)
Dept: FAMILY MEDICINE CLINIC | Age: 78
End: 2018-11-30

## 2018-11-30 NOTE — TELEPHONE ENCOUNTER
Agata from the lab called on bahalf of the pt. They received a stool sample and the orders that are currently in the system for c diff and culture stool are no good because the order was received in an occult blood kit. They could run the sample but an occult blood order would need to be placed. They do not need us to fax over the order because they will be able to see it in the system.  Please advise

## 2018-12-04 NOTE — TELEPHONE ENCOUNTER
Cem Coronel of [facility] called on behalf of the pt verifying the labs in the system were placed for  a cult blood fit kit to determine if a colonoscopy is required for the pt for colon cancer screening, please advise.

## 2018-12-07 DIAGNOSIS — Z12.11 COLON CANCER SCREENING: Primary | ICD-10-CM

## 2018-12-10 NOTE — TELEPHONE ENCOUNTER
Attempted to contact patient to inform her that her stool kit is ready for pickup but was unable to reach her.

## 2018-12-11 ENCOUNTER — HOSPITAL ENCOUNTER (OUTPATIENT)
Dept: LAB | Age: 78
Discharge: HOME | End: 2018-12-11
Payer: MEDICARE

## 2018-12-11 DIAGNOSIS — R19.5 LOOSE STOOLS: ICD-10-CM

## 2018-12-11 PROCEDURE — 87449 NOS EACH ORGANISM AG IA: CPT

## 2018-12-11 PROCEDURE — 87046 STOOL CULTR AEROBIC BACT EA: CPT

## 2018-12-12 LAB — INTERPRETATION: NORMAL

## 2018-12-13 ENCOUNTER — TELEPHONE (OUTPATIENT)
Dept: FAMILY MEDICINE CLINIC | Age: 78
End: 2018-12-13

## 2018-12-13 LAB — HEMOCCULT STL QL IA: NEGATIVE

## 2018-12-13 NOTE — LETTER
1/9/2019 1:45 PM 
 
Ms. Patrice Moreno [address] [employer] [address] [place] [id number] Ms. Alicia Riverdamon, We have tried multiple times to reach you however with no success. I would like to refer you to see a gastroenterologist. Ayla Robles you have a preference or would you like me to choose one for you. Please contact our office. [phone] Sincerely, Juan Gastelum MD

## 2018-12-13 NOTE — PROGRESS NOTES
Spoke with patient and gave resulted note. Patient stated she still has diarrhea and would like to know what to do next ?

## 2018-12-13 NOTE — TELEPHONE ENCOUNTER
Please advise Ms. Unity that I recommend referring her to see a gastroenterologist. Please ask if she has a preference or would like me to choose one for her.

## 2018-12-15 LAB
BACTERIA SPEC CULT: NORMAL
SERVICE CMNT-IMP: NORMAL

## 2019-01-17 ENCOUNTER — TELEPHONE (OUTPATIENT)
Dept: FAMILY MEDICINE CLINIC | Age: 79
End: 2019-01-17

## 2019-01-17 NOTE — TELEPHONE ENCOUNTER
Patient called and left message stating that she has already seen a Gastroenterologist and he didn't find anything wrong with her either. States that she hasn't had any episodes of diarrhea so it must be clearing and was probably her diet.

## 2019-02-06 DIAGNOSIS — B00.9 HERPES SIMPLEX: ICD-10-CM

## 2019-02-06 DIAGNOSIS — E11.21 TYPE 2 DIABETES MELLITUS WITH DIABETIC NEPHROPATHY, WITHOUT LONG-TERM CURRENT USE OF INSULIN (HCC): ICD-10-CM

## 2019-02-06 RX ORDER — METFORMIN HYDROCHLORIDE 500 MG/1
500 TABLET ORAL 2 TIMES DAILY WITH MEALS
Qty: 180 TAB | Refills: 3 | Status: SHIPPED | OUTPATIENT
Start: 2019-02-06 | End: 2019-05-01 | Stop reason: ALTCHOICE

## 2019-02-06 RX ORDER — OMEGA-3-ACID ETHYL ESTERS 1 G/1
2 CAPSULE, LIQUID FILLED ORAL 2 TIMES DAILY
Qty: 360 CAP | Refills: 1 | Status: SHIPPED | OUTPATIENT
Start: 2019-02-06 | End: 2019-09-06

## 2019-02-06 RX ORDER — VALACYCLOVIR HYDROCHLORIDE 500 MG/1
500 TABLET, FILM COATED ORAL 2 TIMES DAILY
Qty: 180 TAB | Refills: 3 | Status: SHIPPED | OUTPATIENT
Start: 2019-02-06 | End: 2019-05-28 | Stop reason: SDUPTHER

## 2019-02-06 NOTE — TELEPHONE ENCOUNTER
Requested Prescriptions     Pending Prescriptions Disp Refills    omega-3 acid ethyl esters (LOVAZA) 1 gram capsule       Sig: Take 2 Caps by mouth two (2) times a day. Remaining pills:a week worth  Last appt:12/10/18  Next appt:N/A    Pharmacy:[address] mail order      Patient aware of 72 hour time frame.

## 2019-02-06 NOTE — TELEPHONE ENCOUNTER
Requested Prescriptions     Pending Prescriptions Disp Refills    metFORMIN (GLUCOPHAGE) 500 mg tablet 180 Tab 1     Sig: Take 1 Tab by mouth two (2) times daily (with meals).  valACYclovir (VALTREX) 500 mg tablet 180 Tab 1     Sig: Take 1 Tab by mouth two (2) times a day. Remaining pills:a week worth  Last appt:12/10/18  Next appt:N/A    Pharmacy:[facility] mail order pharmacy      Patient aware of 72 hour time frame.

## 2019-02-08 ENCOUNTER — TELEPHONE (OUTPATIENT)
Dept: FAMILY MEDICINE CLINIC | Age: 79
End: 2019-02-08

## 2019-02-08 NOTE — TELEPHONE ENCOUNTER
Please advise, insurance is asking about prescription for valacyclovir. If it is for suppression of herpes outbreaks, need to know if she experienced more or less than 10 outbreaks/year prior to treatment.

## 2019-04-25 ENCOUNTER — TELEPHONE (OUTPATIENT)
Dept: FAMILY MEDICINE CLINIC | Age: 79
End: 2019-04-25

## 2019-05-01 ENCOUNTER — OFFICE VISIT (OUTPATIENT)
Dept: FAMILY MEDICINE CLINIC | Age: 79
End: 2019-05-01

## 2019-05-01 VITALS
RESPIRATION RATE: 16 BRPM | WEIGHT: 178.2 LBS | DIASTOLIC BLOOD PRESSURE: 64 MMHG | HEART RATE: 71 BPM | TEMPERATURE: 98.4 F | HEIGHT: 61 IN | OXYGEN SATURATION: 98 % | BODY MASS INDEX: 33.64 KG/M2 | SYSTOLIC BLOOD PRESSURE: 98 MMHG

## 2019-05-01 DIAGNOSIS — K52.9 CHRONIC DIARRHEA: ICD-10-CM

## 2019-05-01 DIAGNOSIS — E11.21 TYPE 2 DIABETES MELLITUS WITH DIABETIC NEPHROPATHY, WITHOUT LONG-TERM CURRENT USE OF INSULIN (HCC): Primary | ICD-10-CM

## 2019-05-01 DIAGNOSIS — I10 ESSENTIAL HYPERTENSION: ICD-10-CM

## 2019-05-01 DIAGNOSIS — F32.9 MAJOR DEPRESSIVE DISORDER WITH SINGLE EPISODE, REMISSION STATUS UNSPECIFIED: ICD-10-CM

## 2019-05-01 DIAGNOSIS — E66.9 OBESITY (BMI 30.0-34.9): ICD-10-CM

## 2019-05-01 PROBLEM — E66.01 SEVERE OBESITY WITH BODY MASS INDEX (BMI) OF 35.0 TO 39.9 WITH SERIOUS COMORBIDITY (HCC): Status: RESOLVED | Noted: 2018-07-23 | Resolved: 2019-05-01

## 2019-05-01 LAB — HBA1C MFR BLD HPLC: 6.4 %

## 2019-05-01 RX ORDER — METFORMIN HYDROCHLORIDE 750 MG/1
TABLET, EXTENDED RELEASE ORAL
Qty: 90 TAB | Refills: 3 | Status: SHIPPED | OUTPATIENT
Start: 2019-05-01 | End: 2019-05-09 | Stop reason: SDUPTHER

## 2019-05-01 NOTE — PROGRESS NOTES
HISTORY OF PRESENT ILLNESS Michelle Mackenzie is a 69 y.o. female. Memory Loss The history is provided by the patient and medical records. This is a chronic problem. Pertinent negatives include no tingling. Diabetes The history is provided by the patient and medical records. This is a chronic problem. Pertinent negatives include no chest pain, no abdominal pain and no shortness of breath. Mr#: [id number] Patient Active Problem List  
Diagnosis Code  Type 2 diabetes mellitus with diabetic nephropathy, without long-term current use of insulin ([facility]) E11.21  
 Essential hypertension I10  Major depressive disorder with single episode F32.9  Gastroesophageal reflux disease without esophagitis K21.9  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, T43.505A  Osteoarthritis of lumbar spine M47.816  Severe obesity with body mass index (BMI) of 35.0 to 39.9 with serious comorbidity ([facility]) E66.01  
 
 
 
Current Outpatient Medications:  
  nadolol (CORGARD) 40 mg tablet, TAKE 1 TABLET DAILY, Disp: 90 Tab, Rfl: 3 
  metFORMIN (GLUCOPHAGE) 500 mg tablet, Take 1 Tab by mouth two (2) times daily (with meals). , Disp: 180 Tab, Rfl: 3 
  valACYclovir (VALTREX) 500 mg tablet, Take 1 Tab by mouth two (2) times a day., Disp: 180 Tab, Rfl: 3 
  omega-3 acid ethyl esters (LOVAZA) 1 gram capsule, Take 2 Caps by mouth two (2) times a day., Disp: 360 Cap, Rfl: 1 
  losartan (COZAAR) 100 mg tablet, Take 1 Tab by mouth daily. , Disp: 90 Tab, Rfl: 1 
  felodipine (PLENDIL SR) 5 mg 24 hr tablet, Take 1 Tab by mouth daily. , Disp: 90 Tab, Rfl: 1 
  aspirin delayed-release 81 mg tablet, Take  by mouth daily. , Disp: , Rfl:  
  OLANZapine (ZYPREXA) 5 mg tablet, Take  by mouth two (2) times a day., Disp: , Rfl:  
  deutetrabenazine (AUSTEDO) 12 mg tab, Take 12 mg by mouth two (2) times a day., Disp: , Rfl:  
  
Allergies Allergen Reactions  Gabapentin Other (comments) Confusion  Resperal-Dm [Brompheniramine-Pseudoeph-Dm] Other (comments) Difficulty standing  Zocor [Simvastatin] Other (comments) Muscle pain Review of Systems Constitutional: Negative for fever and weight loss. Eyes: Negative for blurred vision. Respiratory: Negative for shortness of breath. Cardiovascular: Negative for chest pain and palpitations. Gastrointestinal: Positive for diarrhea (almost every day). Negative for abdominal pain, blood in stool, constipation, heartburn, melena, nausea and vomiting. Genitourinary: Negative for frequency. Neurological: Negative for tingling and sensory change. Endo/Heme/Allergies: Negative for polydipsia. Psychiatric/Behavioral: Positive for depression (followed by psychiatry) and memory loss ( Concerned about her memory because often she cannot think of a word or name and then remembers it later. Reports an extensive family history of dementia. Denies any problems with getting lost or managing finances. ). Negative for suicidal ideas. Reports having discussed her memory concerns with her psychiatrist who advised her that she was \"stirring the pot\" Visit Vitals BP 98/64 (BP 1 Location: Left arm, BP Patient Position: Sitting) Pulse 71 Temp 98.4 °F (36.9 °C) (Oral) Resp 16 Ht 5' 1\" (1.549 m) Wt 178 lb 3.2 oz (80.8 kg) SpO2 98% BMI 33.67 kg/m² Physical Exam  
Constitutional: She is oriented to person, place, and time. She appears well-developed and well-nourished. HENT:  
Head: Normocephalic. Right Ear: Tympanic membrane and ear canal normal.  
Left Ear: Tympanic membrane and ear canal normal.  
Mouth/Throat: Oropharynx is clear and moist.  
Persistent oral symptoms of tardive dyskinesia Eyes: Pupils are equal, round, and reactive to light. Conjunctivae and EOM are normal.  
Neck: Neck supple. Cardiovascular: Normal rate, regular rhythm, normal heart sounds and intact distal pulses. Pulmonary/Chest: Effort normal and breath sounds normal.  
Abdominal: Soft. Bowel sounds are normal. She exhibits no mass. There is no tenderness. Musculoskeletal: She exhibits no edema. Neurological: She is alert and oriented to person, place, and time. She has normal reflexes. Skin: Skin is warm and dry. Psychiatric: She has a normal mood and affect. Her behavior is normal.  
Nursing note and vitals reviewed. Diabetic foot exam performed by Antonino Rico MD  
 
Measurement  Response Nurse Comment Physician Comment Monofilament  R - normal sensation with micro filament L - normal sensation with micro filament Pulse DP R - 2+ (normal) L - 2+ (normal) Pulse TP R - 2+ (normal) L - 2+ (normal) Structural deformity R - None L - None Skin Integrity / Deformity R - None L - None Results for Amada Blankenship (MRN [id number]) as of 5/1/2019 16:29 Ref. Range 7/9/2018 09:42 7/11/2018 10:43 11/5/2018 14:59 12/11/2018 13:00 5/1/2019 13:35 Triglyceride Latest Ref Range: <150 MG/DL  120 Cholesterol, total Latest Ref Range: <200 MG/DL  194 HDL Cholesterol Latest Ref Range: 40 - 60 MG/DL  108 (H) CHOL/HDL Ratio Latest Ref Range: 0 - 5.0    1.8 LDL, calculated Latest Ref Range: 0 - 100 MG/DL  62 VLDL, calculated Latest Units: MG/DL  24 Hemoglobin A1c (POC) Latest Units: % 7.4  7.1  6.4 Creatinine, urine Latest Ref Range: 30 - 125 mg/dL  99.09 Microalbumin,urine random Latest Ref Range: 0 - 3.0 MG/DL  6.80 (H) Microalbumin/Creat. Ratio Latest Ref Range: 0 - 30 mg/g  69 (H) ASSESSMENT and PLAN 
  ICD-10-CM ICD-9-CM 1. Type 2 diabetes mellitus with diabetic nephropathy, without long-term current use of insulin ([facility]) E11.21 250.40 AMB POC HEMOGLOBIN A1C  
  583.81 LIPID PANEL  
   METABOLIC PANEL, BASIC MICROALBUMIN, UR, RAND W/ MICROALB/CREAT RATIO  
    DIABETES FOOT EXAM  
2.  Chronic diarrhea K52.9 787.91   
 3. Essential hypertension I10 401.9 LIPID PANEL  
   METABOLIC PANEL, BASIC 4. Major depressive disorder with single episode, remission status unspecified F32.9 296.20   
5. Obesity (BMI 30.0-34. 9) E66.9 278.00 Advised that in my opinion her symptoms are not suggestive of dementia. Improvement in diabetic control. Suspect diarrhea may be associated with metformin. Hypertension well controlled Have diabetic eye exam, results sent here. Ask pharmacy to send immunization records here. Stop metformin 500 mg twice daily and begin metformin  mg daily with the evening meal. 
Continue to avoid dietary starch and sugar and get as much regular aerobic exercise as possible. Further disposition pending lab results if indicated. Return for follow-up in 6 months, sooner with any problems.

## 2019-05-01 NOTE — PROGRESS NOTES
Idania Mcdaniels is a 69 y.o. female (: 1940) presenting to address: Chief Complaint Patient presents with  Referral Request  
  wants to be tested for alheizmer/dementia; psychiatrist she sees does not do that testing  Diarrhea  
  c/o diarrhea in last 2 month-intermittently Vitals:  
 19 1330 BP: 98/64 Pulse: 71 Resp: 16 Temp: 98.4 °F (36.9 °C) TempSrc: Oral  
SpO2: 98% Weight: 178 lb 3.2 oz (80.8 kg) Height: 5' 1\" (1.549 m) PainSc:   0 - No pain Hearing/Vision: No exam data present Learning Assessment:  
 
Learning Assessment 2018 PRIMARY LEARNER Patient HIGHEST LEVEL OF EDUCATION - PRIMARY LEARNER  4 YEARS OF COLLEGE  
BARRIERS PRIMARY LEARNER NONE  
CO-LEARNER CAREGIVER No  
PRIMARY LANGUAGE ENGLISH  
LEARNER PREFERENCE PRIMARY READING  
ANSWERED BY patient RELATIONSHIP SELF Depression Screening:  
 
3 most recent PHQ Screens 2018 Little interest or pleasure in doing things Not at all Feeling down, depressed, irritable, or hopeless Not at all Total Score PHQ 2 0 Fall Risk Assessment:  
 
Fall Risk Assessment, last 12 mths 2019 Able to walk? Yes Fall in past 12 months? No  
 
Abuse Screening: No flowsheet data found. Coordination of Care Questionaire: 1. Have you been to the ER, urgent care clinic since your last visit? Hospitalized since your last visit? NO 
 
2. Have you seen or consulted any other health care providers outside of the [address] [place] [address] since your last visit? Include any pap smears or colon screening. YES-psychiatrist 
 
Advanced Directive: 1. Do you have an Advanced Directive? NO 
 
2. Would you like information on Advanced Directives?  NO

## 2019-05-01 NOTE — PATIENT INSTRUCTIONS
Have diabetic eye exam, results sent here. Ask pharmacy to send immunization records here. Stop metformin 500 mg twice daily and begin metformin  mg daily with the evening meal. 
Continue to avoid dietary starch and sugar and get as much regular aerobic exercise as possible. Further disposition pending lab results if indicated. Return for follow-up in 6 months, sooner with any problems. Learning About Low-Carbohydrate Diets for Weight Loss What is a low-carbohydrate diet? Low-carb diets avoid foods that are high in carbohydrate. These high-carb foods include pasta, bread, rice, cereal, fruits, and starchy vegetables. Instead, these diets usually have you eat foods that are high in fat and protein. Many people lose weight quickly on a low-carb diet. But the early weight loss is water. People on this diet often gain the weight back after they start eating carbs again. Not all diet plans are safe or work well. A lot of the evidence shows that low-carb diets aren't healthy. That's because these diets often don't include healthy foods like fruits and vegetables. Losing weight safely means balancing protein, fat, and carbs with every meal and snack. And low-carb diets don't always provide the vitamins, minerals, and fiber you need. If you have a serious medical condition, talk to your doctor before you try any diet. These conditions include kidney disease, heart disease, type 2 diabetes, high cholesterol, and high blood pressure. If you are pregnant, it may not be safe for your baby if you are on a low-carb diet. How can you lose weight safely? You might have heard that a diet plan helped another person lose weight. But that doesn't mean that it will work for you. It is very hard to stay on a diet that includes lots of big changes in your eating habits. If you want to get to a healthy weight and stay there, making healthy lifestyle changes will often work better than dieting. These steps can help. · Make a plan for change. Work with your doctor to create a plan that is right for you. · See a dietitian. He or she can show you how to make healthy changes in your eating habits. · Manage stress. If you have a lot of stress in your life, it can be hard to focus on making healthy changes to your daily habits. · Track your food and activity. You are likely to do better at losing weight if you keep track of what you eat and what you do. Follow-up care is a key part of your treatment and safety. Be sure to make and go to all appointments, and call your doctor if you are having problems. It's also a good idea to know your test results and keep a list of the medicines you take. Where can you learn more? Go to http://levy-tasha.info/. Enter A121 in the search box to learn more about \"Learning About Low-Carbohydrate Diets for Weight Loss. \" Current as of: March 28, 2018 Content Version: 11.9 © 5466-8654 [employer]. Care instructions adapted under license by [employer] (which disclaims liability or warranty for this information). If you have questions about a medical condition or this instruction, always ask your healthcare professional. [address] any warranty or liability for your use of this information.

## 2019-05-03 RX ORDER — METFORMIN HYDROCHLORIDE 750 MG/1
TABLET, EXTENDED RELEASE ORAL
Qty: 90 TAB | Refills: 3 | OUTPATIENT
Start: 2019-05-03

## 2019-05-03 NOTE — TELEPHONE ENCOUNTER
Pt said her recent prescription for Metformin was sent to the wrong pharmacy. She is hoping to have the prescription sent to the [facility] pharmacy instead. I did advise her that Dr. Christin Stone is not in the office today. Requested Prescriptions     Pending Prescriptions Disp Refills    metFORMIN ER (GLUCOPHAGE XR) 750 mg tablet 90 Tab 3     Sig: Take 1 tablet daily with evening meal       Remaining pills:0  Last appt:05/01/19  Next appt:    Pharmacy:[facility]      Patient aware of 72 hour time frame.

## 2019-05-09 RX ORDER — METFORMIN HYDROCHLORIDE 750 MG/1
TABLET, EXTENDED RELEASE ORAL
Qty: 90 TAB | Refills: 3 | Status: CANCELLED | OUTPATIENT
Start: 2019-05-09

## 2019-05-09 RX ORDER — METFORMIN HYDROCHLORIDE 750 MG/1
TABLET, EXTENDED RELEASE ORAL
Qty: 90 TAB | Refills: 3 | Status: SHIPPED | OUTPATIENT
Start: 2019-05-09 | End: 2019-05-14 | Stop reason: DRUGHIGH

## 2019-05-09 NOTE — TELEPHONE ENCOUNTER
Please send metformin to the correct pharmacy [facility]. It was sent to the incorrect pharmacy.

## 2019-05-09 NOTE — TELEPHONE ENCOUNTER
Called patient again, and she states she would like the Rx sent to the local pharmacy listed in her chart, [facility] on [address]; please advise.

## 2019-05-14 ENCOUNTER — TELEPHONE (OUTPATIENT)
Dept: FAMILY MEDICINE CLINIC | Age: 79
End: 2019-05-14

## 2019-05-14 DIAGNOSIS — E11.21 TYPE 2 DIABETES MELLITUS WITH DIABETIC NEPHROPATHY, WITHOUT LONG-TERM CURRENT USE OF INSULIN (HCC): Primary | ICD-10-CM

## 2019-05-14 RX ORDER — METFORMIN HYDROCHLORIDE 500 MG/1
500 TABLET, EXTENDED RELEASE ORAL
Qty: 90 TAB | Refills: 3 | Status: SHIPPED | OUTPATIENT
Start: 2019-05-14 | End: 2019-05-20 | Stop reason: SINTOL

## 2019-05-14 NOTE — TELEPHONE ENCOUNTER
Spoke with patient, Rx for Metformin was reduced from 750 mg ER to 500 mg ER daily with meal; patient will call us if she continues to experience diarrhea.

## 2019-05-14 NOTE — TELEPHONE ENCOUNTER
Please advise Ms. Cammy Wong that I would recommend we try decreasing her dose from metformin  metformin  daily with the evening meal.  If diarrhea persists after that we will have to make an additional adjustment. A prescription for the new dose will be sent to her pharmacy.

## 2019-05-14 NOTE — TELEPHONE ENCOUNTER
Patient left message c/o diarrhea for the last several weeks while taking Metformin  mg; wants to know if she should continue taking or what should she do about the diarrhea; please advise.

## 2019-05-20 ENCOUNTER — TELEPHONE (OUTPATIENT)
Dept: FAMILY MEDICINE CLINIC | Age: 79
End: 2019-05-20

## 2019-05-20 DIAGNOSIS — E11.21 TYPE 2 DIABETES MELLITUS WITH DIABETIC NEPHROPATHY, WITHOUT LONG-TERM CURRENT USE OF INSULIN (HCC): Primary | ICD-10-CM

## 2019-05-20 RX ORDER — GLIMEPIRIDE 2 MG/1
2 TABLET ORAL
Qty: 30 TAB | Refills: 11 | Status: SHIPPED | OUTPATIENT
Start: 2019-05-20 | End: 2020-05-27 | Stop reason: ALTCHOICE

## 2019-05-20 NOTE — TELEPHONE ENCOUNTER
Spoke with patient and she is still having diarrhea/loose stools; patient has since stopped taking the metformin; please advise.

## 2019-05-20 NOTE — TELEPHONE ENCOUNTER
Remain off metformin, begin glimepiride 2 mg daily in the morning.   A prescription will be sent to her pharmacy

## 2019-05-21 NOTE — TELEPHONE ENCOUNTER
CHIEF COMPLAINT: prostate biopsy results      HISTORY OF PRESENT ILLNESS: Geovanni Bennett is a 71 year old male presenting for prostate biopsy results. Biopsy shows [place] 3+4 = 7 adenocarcinoma in more than half of the cores. Feliz 4+3 =7 is also seen in 2 cores. Perineural invasion is also noted.       History: no changes compared to initial visit history    PHYSICAL EXAM:   Visit Vitals  /64   Pulse 120   General: well developed, well nourished, well groomed  HEENT: Neck supple, normal thyroid  Lungs: CTA B (clear to auscultation bilaterally), normal respiratory effort  Heart: RRR (regular rate and rhythm), no murmurs  Abdominal: Soft, NT (nontender), normal liver and spleen, no palpable hernias            Assessment: Prostate cancer    Plan: Due to the presence of Feliz 4 elements, a metastatic evaluation is necessary. CT pelvis and bone scans are necessary. Following these studies, we will bring the pt back to discuss treatment options, mainly brachytherapy or prostatectomy.               CC: Kyle Nolasco MD       Spoke with patient, d/c metformin; start glimeride 2 mg daily in morning, Rx sent to pharmacy.

## 2019-05-28 DIAGNOSIS — B00.9 HERPES SIMPLEX: ICD-10-CM

## 2019-05-28 DIAGNOSIS — I10 ESSENTIAL HYPERTENSION: ICD-10-CM

## 2019-05-28 RX ORDER — VALACYCLOVIR HYDROCHLORIDE 500 MG/1
500 TABLET, FILM COATED ORAL 2 TIMES DAILY
Qty: 180 TAB | Refills: 3 | Status: SHIPPED | OUTPATIENT
Start: 2019-05-28 | End: 2020-05-11 | Stop reason: SDUPTHER

## 2019-05-28 RX ORDER — FELODIPINE 5 MG/1
5 TABLET, EXTENDED RELEASE ORAL DAILY
Qty: 90 TAB | Refills: 3 | Status: SHIPPED | OUTPATIENT
Start: 2019-05-28 | End: 2020-05-11 | Stop reason: SDUPTHER

## 2019-05-28 NOTE — TELEPHONE ENCOUNTER
Requested Prescriptions     Pending Prescriptions Disp Refills    felodipine (PLENDIL SR) 5 mg 24 hr tablet 90 Tab 1     Sig: Take 1 Tab by mouth daily.  valACYclovir (VALTREX) 500 mg tablet 180 Tab 3     Sig: Take 1 Tab by mouth two (2) times a day. Patient calling to request refill on: felodipine and valacyclovir      Remaining pills:  Last appt: 05/01/2019  Next appt:    Pharmacy: [facility]    PT WOULD LIKE TO SPEAK WITH ANMOL ABOUT MEDICATION QUESTIONS. Please call back @ [phone]        Patient aware of 72 hour time frame.

## 2019-06-03 ENCOUNTER — OFFICE VISIT (OUTPATIENT)
Dept: FAMILY MEDICINE CLINIC | Age: 79
End: 2019-06-03

## 2019-06-03 VITALS
BODY MASS INDEX: 34.55 KG/M2 | RESPIRATION RATE: 16 BRPM | WEIGHT: 183 LBS | HEART RATE: 69 BPM | OXYGEN SATURATION: 98 % | DIASTOLIC BLOOD PRESSURE: 62 MMHG | HEIGHT: 61 IN | SYSTOLIC BLOOD PRESSURE: 120 MMHG | TEMPERATURE: 97.9 F

## 2019-06-03 DIAGNOSIS — I10 ESSENTIAL HYPERTENSION: ICD-10-CM

## 2019-06-03 DIAGNOSIS — Z00.00 ROUTINE GENERAL MEDICAL EXAMINATION AT A HEALTH CARE FACILITY: Primary | ICD-10-CM

## 2019-06-03 RX ORDER — LOSARTAN POTASSIUM 50 MG/1
50 TABLET ORAL DAILY
Qty: 90 TAB | Refills: 3 | Status: SHIPPED | OUTPATIENT
Start: 2019-06-03 | End: 2020-05-13

## 2019-06-03 NOTE — ACP (ADVANCE CARE PLANNING)
Advance Directives discussed. The patient verbalizes understanding of the concept and is provided with an Advanced Directives form to complete at home and return for entry in his medical record.

## 2019-06-03 NOTE — PATIENT INSTRUCTIONS
5-year personal health care plan:  Continue current medications. Yearly mammography as scheduled. Continue at least yearly eye exams. Continue to avoid dietary starch and sugar and follow a program of regular aerobic exercise. Influenza immunization fall 2019. Office follow-up here in September 2019. Come by for fasting lab several days prior to the scheduled appointment. Follow-up sooner with any problems  Return completed advance directives form. Annual Medicare wellness evaluation June 2020       Well Visit, Over 72: Care Instructions  Your Care Instructions    Physical exams can help you stay healthy. Your doctor has checked your overall health and may have suggested ways to take good care of yourself. He or she also may have recommended tests. At home, you can help prevent illness with healthy eating, regular exercise, and other steps. Follow-up care is a key part of your treatment and safety. Be sure to make and go to all appointments, and call your doctor if you are having problems. It's also a good idea to know your test results and keep a list of the medicines you take. How can you care for yourself at home? · Reach and stay at a healthy weight. This will lower your risk for many problems, such as obesity, diabetes, heart disease, and high blood pressure. · Get at least 30 minutes of exercise on most days of the week. Walking is a good choice. You also may want to do other activities, such as running, swimming, cycling, or playing tennis or team sports. · Do not smoke. Smoking can make health problems worse. If you need help quitting, talk to your doctor about stop-smoking programs and medicines. These can increase your chances of quitting for good. · Protect your skin from too much sun. When you're outdoors from 10 a.m. to 4 p.m., stay in the shade or cover up with clothing and a hat with a wide brim. Wear sunglasses that block UV rays.  Even when it's cloudy, put broad-spectrum sunscreen (SPF 30 or higher) on any exposed skin. · See a dentist one or two times a year for checkups and to have your teeth cleaned. · Wear a seat belt in the car. · Limit alcohol to 2 drinks a day for men and 1 drink a day for women. Too much alcohol can cause health problems. Follow your doctor's advice about when to have certain tests. These tests can spot problems early. For men and women  · Cholesterol. Your doctor will tell you how often to have this done based on your overall health and other things that can increase your risk for heart attack and stroke. · Blood pressure. Have your blood pressure checked during a routine doctor visit. Your doctor will tell you how often to check your blood pressure based on your age, your blood pressure results, and other factors. · Diabetes. Ask your doctor whether you should have tests for diabetes. · Vision. Experts recommend that you have yearly exams for glaucoma and other age-related eye problems. · Hearing. Tell your doctor if you notice any change in your hearing. You can have tests to find out how well you hear. · Colon cancer tests. Keep having colon cancer tests as your doctor recommends. You can have one of several types of tests. · Heart attack and stroke risk. At least every 4 to 6 years, you should have your risk for heart attack and stroke assessed. Your doctor uses factors such as your age, blood pressure, cholesterol, and whether you smoke or have diabetes to show what your risk for a heart attack or stroke is over the next 10 years. · Osteoporosis. Talk to your doctor about whether you should have a bone density test to find out whether you have thinning bones. Also ask your doctor about whether you should take calcium and vitamin D supplements. For women  · Pap test and pelvic exam. You may no longer need a Pap test. Talk with your doctor about whether to stop or continue to have Pap tests.   · Breast exam and mammogram. Ask how often you should have a mammogram, which is an X-ray of your breasts. A mammogram can spot breast cancer before it can be felt and when it is easiest to treat. · Thyroid disease. Talk to your doctor about whether to have your thyroid checked as part of a regular physical exam. Women have an increased chance of a thyroid problem. For men  · Prostate exam. Talk to your doctor about whether you should have a blood test (called a PSA test) for prostate cancer. Experts disagree on whether men should have this test. Some experts recommend that you discuss the benefits and risks of the test with your doctor. · Abdominal aortic aneurysm. Ask your doctor whether you should have a test to check for an aneurysm. You may need a test if you ever smoked or if your parent, brother, sister, or child has had an aneurysm. When should you call for help? Watch closely for changes in your health, and be sure to contact your doctor if you have any problems or symptoms that concern you. Where can you learn more? Go to http://levy-tasha.info/. Enter B643 in the search box to learn more about \"Well Visit, Over 65: Care Instructions. \"  Current as of: March 28, 2018  Content Version: 11.9  © 2782-1131 [employer]. Care instructions adapted under license by [employer] (which disclaims liability or warranty for this information). If you have questions about a medical condition or this instruction, always ask your healthcare professional. [place] [address] any warranty or liability for your use of this information.

## 2019-06-03 NOTE — PROGRESS NOTES
Rachel Paulino is a 69 y.o. female (: 1940) presenting to address:    Chief Complaint   Patient presents with    Annual Wellness Visit       Vitals:    19 1522   BP: 120/62   Pulse: 69   Resp: 16   Temp: 97.9 °F (36.6 °C)   TempSrc: Oral   SpO2: 98%   Weight: 183 lb (83 kg)   Height: 5' 1\" (1.549 m)   PainSc:   0 - No pain       Hearing/Vision:      Hearing Screening    125Hz 250Hz 500Hz 1000Hz 2000Hz 3000Hz 4000Hz 6000Hz 8000Hz   Right ear:    40 40  40     Left ear:    40 40  40        Visual Acuity Screening    Right eye Left eye Both eyes   Without correction:      With correction: 20/70 20/200 20/70       Learning Assessment:     Learning Assessment 2018   PRIMARY LEARNER Patient   HIGHEST LEVEL OF EDUCATION - PRIMARY LEARNER  4 YEARS OF COLLEGE   BARRIERS PRIMARY LEARNER NONE   CO-LEARNER CAREGIVER No   PRIMARY LANGUAGE ENGLISH   LEARNER PREFERENCE PRIMARY READING   ANSWERED BY patient   RELATIONSHIP SELF     Depression Screening:     3 most recent PHQ Screens 2018   Little interest or pleasure in doing things Not at all   Feeling down, depressed, irritable, or hopeless Not at all   Total Score PHQ 2 0     Fall Risk Assessment:     Fall Risk Assessment, last 12 mths 6/3/2019   Able to walk? Yes   Fall in past 12 months? No     Abuse Screening:     Abuse Screening Questionnaire 6/3/2019   Do you ever feel afraid of your partner? N   Are you in a relationship with someone who physically or mentally threatens you? N   Is it safe for you to go home? Y     Coordination of Care Questionaire:   1. Have you been to the ER, urgent care clinic since your last visit? Hospitalized since your last visit? NO    2. Have you seen or consulted any other health care providers outside of the [address] [place] [address] since your last visit? Include any pap smears or colon screening. YES; pulmonary on 19    Advanced Directive:   1. Do you have an Advanced Directive? NO    2.  Would you like information on Advanced Directives?  NO

## 2019-06-03 NOTE — PROGRESS NOTES
Date of Service:  6/3/2019   Patient Name:  Lavon Pineda   Patient :  1940     This is an Initial Medicare Annual Wellness Exam (AWV) (Performed 12 months after IPPE or effective date of Medicare Part B enrollment, Once in a lifetime)      I have reviewed the patient's medical history in detail and updated the computerized patient record. History     Past Medical History:   Diagnosis Date    Delusion ([facility] [address]     Depression     Diabetes ([facility] [address]     type 2    GERD (gastroesophageal reflux disease)     Hypertension       Past Surgical History:   Procedure Laterality Date    HX BACK SURGERY      lower back surgery, done last year    HX  SECTION      HX THORACIC LAMINECTOMY      HX TONSILLECTOMY       Current Outpatient Medications   Medication Sig Dispense Refill    losartan (COZAAR) 50 mg tablet Take 1 Tab by mouth daily. 90 Tab 3    felodipine (PLENDIL SR) 5 mg 24 hr tablet Take 1 Tab by mouth daily. 90 Tab 3    valACYclovir (VALTREX) 500 mg tablet Take 1 Tab by mouth two (2) times a day. 180 Tab 3    glimepiride (AMARYL) 2 mg tablet Take 1 Tab by mouth every morning. 30 Tab 11    nadolol (CORGARD) 40 mg tablet TAKE 1 TABLET DAILY 90 Tab 3    omega-3 acid ethyl esters (LOVAZA) 1 gram capsule Take 2 Caps by mouth two (2) times a day. 360 Cap 1    aspirin delayed-release 81 mg tablet Take  by mouth daily.  OLANZapine (ZYPREXA) 5 mg tablet Take  by mouth two (2) times a day.  deutetrabenazine (AUSTEDO) 12 mg tab Take 12 mg by mouth two (2) times a day.        Allergies   Allergen Reactions    Gabapentin Other (comments)     Confusion      Resperal-Dm [Brompheniramine-Pseudoeph-Dm] Other (comments)     Difficulty standing    Zocor [Simvastatin] Other (comments)     Muscle pain     Family History   Problem Relation Age of Onset    Hypertension Father     Cancer Neg Hx     Diabetes Neg Hx      Social History     Tobacco Use    Smoking status: Former Smoker    Smokeless tobacco: Former User    Tobacco comment: quit 34 years ago   Substance Use Topics    Alcohol use: No     Patient Active Problem List   Diagnosis Code    Type 2 diabetes mellitus with diabetic nephropathy, without long-term current use of insulin ([facility]) E11.21    Essential hypertension I10    Major depressive disorder with single episode F32.9    Gastroesophageal reflux disease without esophagitis K21.9    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 Functional Ability and Level of Safety:     Hearing Loss   Hearing is good. Activities of Daily Living   Self-care. Requires assistance with: no ADLs    Fall Risk     Fall Risk Assessment, last 12 mths 6/3/2019   Able to walk? Yes   Fall in past 12 months? No     Abuse Screen   Patient is not abused    Review of Systems   Review of systems reported in recent previous encounter, unchanged. Physical Examination   Physical exam reported in recent encounter, not indicated today .   VS:   Visit Vitals  /62 (BP 1 Location: Left arm, BP Patient Position: Sitting)   Pulse 69   Temp 97.9 °F (36.6 °C) (Oral)   Resp 16   Ht 5' 1\" (1.549 m)   Wt 183 lb (83 kg)   SpO2 98%   BMI 34.58 kg/m²        Hearing Screening    125Hz 250Hz 500Hz 1000Hz 2000Hz 3000Hz 4000Hz 6000Hz 8000Hz   Right ear:    40 40  40     Left ear:    40 40  40        Visual Acuity Screening    Right eye Left eye Both eyes   Without correction:      With correction: 20/70 20/200 20/70        Evaluation of Cognitive Function:  Mood/affect:  neutral  Appearance: age appropriate  Family member/caregiver input: no    Patient Care Team:  Romaine Aguero MD as PCP - General (Family Practice)  Bhargavi Luna MD as Consulting Provider (Psychiatry)  Libra Blackmon MD as Consulting Provider (Anesthesiology)    Advice/Counseling   Education and counseling provided:  Advance Directives counseling/discussion    Assessment/Plan       ICD-10-CM ICD-9-CM    1. Routine general medical examination at a health care facility Z00.00 V70.0    2. Essential hypertension I10 401.9      Health Maintenance Review:  Colonoscopy - Reported current  Mammogram - yearly, negative scheduled  Pap Smear -N/A  Dexa Scan - Declines  Glaucoma Screen/diabetic eye exam-3/21/2019    Immunization History   Administered Date(s) Administered    Influenza High Dose Vaccine PF 10/29/2018    Influenza Vaccine 09/28/2010, 09/29/2011, 10/05/2013, 10/30/2018    Pneumococcal Conjugate (PCV-13) 04/20/2015    Pneumococcal Polysaccharide (PPSV-23) 07/23/2018    Tdap 08/22/2015    Zoster Recombinant 07/23/2018, 07/23/2018     5-year personal health care plan:  Continue current medications. Yearly mammography as scheduled. Continue at least yearly eye exams. Continue to avoid dietary starch and sugar and follow a program of regular aerobic exercise. Influenza immunization fall 2019. Office follow-up here in September 2019. Come by for fasting lab several days prior to the scheduled appointment. Follow-up sooner with any problems  Return completed advance directives form.   Annual Medicare wellness evaluation June 2020      Doron Young was provided a written 5-year personalized preventive services plan, which was included in her Catie Betts MD

## 2019-06-17 ENCOUNTER — TELEPHONE (OUTPATIENT)
Dept: FAMILY MEDICINE CLINIC | Age: 79
End: 2019-06-17

## 2019-06-17 NOTE — TELEPHONE ENCOUNTER
Patient started taking glimeride (amaryl) 2 mg on 5/20/19; one tab by mouth every morning; states fasting blood sugars are elevated in morning before eating (6/11/19: 139; 6/12/19: 129; 6/13/19: 121; 6/14/19: 133; 6/15/19: 128; 6/16/19: 119; 6/17/19: 134) patient wants to know should she be concerned; please advise.

## 2019-09-06 ENCOUNTER — OFFICE VISIT (OUTPATIENT)
Dept: FAMILY MEDICINE CLINIC | Age: 79
End: 2019-09-06

## 2019-09-06 VITALS
HEIGHT: 61 IN | TEMPERATURE: 98.4 F | DIASTOLIC BLOOD PRESSURE: 68 MMHG | WEIGHT: 182.2 LBS | RESPIRATION RATE: 16 BRPM | BODY MASS INDEX: 34.4 KG/M2 | OXYGEN SATURATION: 96 % | SYSTOLIC BLOOD PRESSURE: 116 MMHG | HEART RATE: 74 BPM

## 2019-09-06 DIAGNOSIS — E11.21 TYPE 2 DIABETES MELLITUS WITH DIABETIC NEPHROPATHY, WITHOUT LONG-TERM CURRENT USE OF INSULIN (HCC): Primary | ICD-10-CM

## 2019-09-06 LAB — HBA1C MFR BLD HPLC: 6.1 %

## 2019-09-06 NOTE — PROGRESS NOTES
HISTORY OF PRESENT ILLNESS  Kar Enciso is a 69 y.o. female. Diabetes   The history is provided by the patient and medical records. This is a chronic problem. Pertinent negatives include no chest pain, no abdominal pain and no headaches. Review of Systems   Constitutional: Negative for fever and weight loss. Has been concerned because of difficulty losing weight   Eyes: Negative for blurred vision. Respiratory: Negative for sputum production. Cardiovascular: Negative for chest pain and palpitations. Gastrointestinal: Negative for abdominal pain. Genitourinary: Negative for frequency. Neurological: Negative for sensory change and headaches. Endo/Heme/Allergies: Negative for polydipsia. Reports home a.m. glucometer readings consistently less than 122, concerned because her dietitian has advised her that these should all be below 100       Physical Exam   Constitutional: She is oriented to person, place, and time. She appears well-developed and well-nourished. HENT:   Head: Normocephalic. Eyes: Conjunctivae and EOM are normal.   Neck: Neck supple. Cardiovascular: Normal rate, regular rhythm and normal heart sounds. Pulmonary/Chest: Effort normal and breath sounds normal.   Musculoskeletal: She exhibits no edema. Neurological: She is alert and oriented to person, place, and time. Skin: Skin is warm and dry. Psychiatric: She has a normal mood and affect. Her behavior is normal.   Nursing note and vitals reviewed. Results for Barbara Michaud (MRN [id number]) as of 9/6/2019 12:03   Ref. Range 11/5/2018 14:59 12/11/2018 13:00 5/1/2019 13:35 9/6/2019 11:40   Hemoglobin A1c (POC) Latest Units: % 7.1  6.4 6.1     ASSESSMENT and PLAN    ICD-10-CM ICD-9-CM    1.  Type 2 diabetes mellitus with diabetic nephropathy, without long-term current use of insulin ([facility]) E11.21 250.40 AMB POC HEMOGLOBIN A1C     583.81    Assured that diabetes is in excellent control and that there is no benefit to having lower glucose levels than she has now and there is a possible risk of hypoglycemia with attempts to lower glucose levels further  Continue current medications  Avoid dietary starch and sugar and follow a program of regular aerobic exercise

## 2019-09-06 NOTE — PATIENT INSTRUCTIONS
Continue current medications  Avoid dietary starch and sugar and follow a program of regular aerobic exercise     Counting Carbohydrates: Care Instructions  Your Care Instructions    You don't have to eat special foods when you have diabetes. You just have to be careful to eat healthy foods. Carbohydrates (carbs) raise blood sugar higher and quicker than any other nutrient. Carbs are found in desserts, breads and cereals, and fruit. They're also in starchy vegetables. These include potatoes, corn, and grains such as rice and pasta. Carbs are also in milk and yogurt. The more carbs you eat at one time, the higher your blood sugar will rise. Spreading carbs all through the day helps keep your blood sugar levels within your target range. Counting carbs is one of the best ways to keep your blood sugar under control. If you use insulin, counting carbs helps you match the right amount of insulin to the number of grams of carbs in a meal. Then you can change your diet and insulin dose as needed. Testing your blood sugar several times a day can help you learn how carbs affect your blood sugar. A registered dietitian or certified diabetes educator can help you plan meals and snacks. Follow-up care is a key part of your treatment and safety. Be sure to make and go to all appointments, and call your doctor if you are having problems. It's also a good idea to know your test results and keep a list of the medicines you take. How can you care for yourself at home? Know your daily amount of carbohydrates  Your daily amount depends on several things, such as your weight, how active you are, which diabetes medicines you take, and what your goals are for your blood sugar levels. A registered dietitian or certified diabetes educator can help you plan how many carbs to include in each meal and snack.   For most adults, a guideline for the daily amount of carbs is:  · 45 to 60 grams at each meal. That's about the same as 3 to 4 carbohydrate servings. · 15 to 20 grams at each snack. That's about the same as 1 carbohydrate serving. Count carbs  Counting carbs lets you know how much rapid-acting insulin to take before you eat. If you use an insulin pump, you get a constant rate of insulin during the day. So the pump must be programmed at meals. This gives you extra insulin to cover the rise in blood sugar after meals. If you take insulin:  · Learn your own insulin-to-carb ratio. You and your diabetes health professional will figure out the ratio. You can do this by testing your blood sugar after meals. For example, you may need a certain amount of insulin for every 15 grams of carbs. · Add up the carb grams in a meal. Then you can figure out how many units of insulin to take based on your insulin-to-carb ratio. · Exercise lowers blood sugar. You can use less insulin than you would if you were not doing exercise. Keep in mind that timing matters. If you exercise within 1 hour after a meal, your body may need less insulin for that meal than it would if you exercised 3 hours after the meal. Test your blood sugar to find out how exercise affects your need for insulin. If you do or don't take insulin:  · Look at labels on packaged foods. This can tell you how many carbs are in a serving. You can also use guides from the American Diabetes Association. · Be aware of portions, or serving sizes. If a package has two servings and you eat the whole package, you need to double the number of grams of carbohydrate listed for one serving. · Protein, fat, and fiber do not raise blood sugar as much as carbs do. If you eat a lot of these nutrients in a meal, your blood sugar will rise more slowly than it would otherwise. Eat from all food groups  · Eat at least three meals a day. · Plan meals to include food from all the food groups. The food groups include grains, fruits, dairy, proteins, and vegetables.   · Talk to your dietitian or diabetes educator about ways to add limited amounts of sweets into your meal plan. · If you drink alcohol, talk to your doctor. It may not be recommended when you are taking certain diabetes medicines. Where can you learn more? Go to http://levy-tasha.info/. Enter Q791 in the search box to learn more about \"Counting Carbohydrates: Care Instructions. \"  Current as of: July 25, 2018  Content Version: 12.1  © 8888-5060 [employer]. Care instructions adapted under license by [employer] (which disclaims liability or warranty for this information). If you have questions about a medical condition or this instruction, always ask your healthcare professional. [address] any warranty or liability for your use of this information.

## 2019-09-06 NOTE — PROGRESS NOTES
Jimmie Oreilly is a 69 y.o. female (: 1940) presenting to address:    Chief Complaint   Patient presents with    Diabetes     declined flu vaccine, received it through the pharmacy       Vitals:    19 1134   BP: 116/68   Pulse: 74   Resp: 16   Temp: 98.4 °F (36.9 °C)   TempSrc: Oral   SpO2: 96%   Weight: 182 lb 3.2 oz (82.6 kg)   Height: 5' 1\" (1.549 m)   PainSc:   0 - No pain       Hearing/Vision:   No exam data present    Learning Assessment:     Learning Assessment 2018   PRIMARY LEARNER Patient   HIGHEST LEVEL OF EDUCATION - PRIMARY LEARNER  4 YEARS OF COLLEGE   BARRIERS PRIMARY LEARNER NONE   CO-LEARNER CAREGIVER No   PRIMARY LANGUAGE ENGLISH   LEARNER PREFERENCE PRIMARY READING   ANSWERED BY patient   RELATIONSHIP SELF     Depression Screening:     3 most recent PHQ Screens 2018   Little interest or pleasure in doing things Not at all   Feeling down, depressed, irritable, or hopeless Not at all   Total Score PHQ 2 0     Fall Risk Assessment:     Fall Risk Assessment, last 12 mths 2019   Able to walk? Yes   Fall in past 12 months? No     Abuse Screening:     Abuse Screening Questionnaire 6/3/2019   Do you ever feel afraid of your partner? N   Are you in a relationship with someone who physically or mentally threatens you? N   Is it safe for you to go home? Y     Coordination of Care Questionaire:   1. Have you been to the ER, urgent care clinic since your last visit? Hospitalized since your last visit? NO    2. Have you seen or consulted any other health care providers outside of the [address] [place] [address] since your last visit? Include any pap smears or colon screening. NO    Advanced Directive:   1. Do you have an Advanced Directive? NO    2. Would you like information on Advanced Directives?  NO    Patient DECLINED flu vaccine. (patient received through pharmacy.)

## 2020-01-19 NOTE — PROGRESS NOTES
HISTORY OF PRESENT ILLNESS  Kayla Zarate is a 67 y.o. female. Diabetes   The history is provided by the patient and medical records. This is a chronic problem. Pertinent negatives include no chest pain, no abdominal pain and no shortness of breath. Hypertension    The history is provided by the patient and medical records. This is a chronic problem. Pertinent negatives include no chest pain, no palpitations, no blurred vision, no shortness of breath, no nausea and no vomiting. Review of Systems   Constitutional: Negative for fever and weight loss. Eyes: Negative for blurred vision. Respiratory: Negative for cough and shortness of breath. Cardiovascular: Negative for chest pain and palpitations. Gastrointestinal: Negative for abdominal pain, heartburn, nausea and vomiting. Genitourinary: Negative for dysuria, frequency and urgency. Musculoskeletal: Positive for back pain (chronic low back pain). Neurological: Negative for tingling and sensory change. Endo/Heme/Allergies: Negative for polydipsia. Psychiatric/Behavioral: Positive for depression. Visit Vitals  /56 (BP 1 Location: Left arm, BP Patient Position: Sitting)   Pulse 74   Temp 97.5 °F (36.4 °C) (Oral)   Ht 5' 1\" (1.549 m)   Wt 168 lb 6.4 oz (76.4 kg)   SpO2 100%   BMI 31.82 kg/m²       Physical Exam  Vitals signs and nursing note reviewed. Constitutional:       Appearance: Normal appearance. She is well-developed. She is obese. Comments: 14 pound weight loss since 9/2019   HENT:      Head: Normocephalic. Eyes:      Conjunctiva/sclera: Conjunctivae normal.   Neck:      Musculoskeletal: Neck supple. Cardiovascular:      Rate and Rhythm: Normal rate and regular rhythm. Heart sounds: Normal heart sounds. Pulmonary:      Effort: Pulmonary effort is normal.      Breath sounds: Normal breath sounds. Skin:     General: Skin is warm and dry.    Neurological:      Mental Status: She is alert and oriented to person, place, and time. Psychiatric:         Behavior: Behavior normal.       Results for Soniya Talavera (MRN [id number]) as of 1/20/2020 11:31   Ref. Range 5/1/2019 13:35 9/6/2019 11:40 1/20/2020 11:03 1/20/2020 11:10   Hemoglobin A1c (POC) Latest Units: % 6.4 6.1 6.0    Microalbumin urine (POC) Latest Units: MG/L    30   Microalbumin/creat ratio (POC) Latest Ref Range: <30 MG/G    <30     ASSESSMENT and PLAN    ICD-10-CM ICD-9-CM    1. Type 2 diabetes mellitus with diabetic nephropathy, without long-term current use of insulin ([facility]) E11.21 250.40 AMB POC HEMOGLOBIN A1C     583.81 AMB POC URINE, MICROALBUMIN, SEMIQUANTITATIVE      LIPID PANEL   2. Essential hypertension P35 297.7 METABOLIC PANEL, BASIC   3. Major depressive disorder with single episode, remission status unspecified F32.9 296.20    4. Obesity (BMI 30.0-34. 9) E66.9 278.00    Continue current medications  Lab today disposition pending lab results if indicated  Avoid dietary salt, starch and sugar and is much as possible follow a program of regular aerobic exercise  Schedule follow-up and Medicare wellness evaluation in 5 months and come by for fasting lab a few days prior to that appointment  Follow-up sooner with any problems

## 2020-01-20 ENCOUNTER — OFFICE VISIT (OUTPATIENT)
Dept: FAMILY MEDICINE CLINIC | Age: 80
End: 2020-01-20

## 2020-01-20 ENCOUNTER — TELEPHONE (OUTPATIENT)
Dept: FAMILY MEDICINE CLINIC | Age: 80
End: 2020-01-20

## 2020-01-20 VITALS
TEMPERATURE: 97.5 F | BODY MASS INDEX: 31.79 KG/M2 | HEIGHT: 61 IN | RESPIRATION RATE: 16 BRPM | OXYGEN SATURATION: 100 % | SYSTOLIC BLOOD PRESSURE: 118 MMHG | HEART RATE: 74 BPM | DIASTOLIC BLOOD PRESSURE: 56 MMHG | WEIGHT: 168.4 LBS

## 2020-01-20 DIAGNOSIS — F32.9 MAJOR DEPRESSIVE DISORDER WITH SINGLE EPISODE, REMISSION STATUS UNSPECIFIED: ICD-10-CM

## 2020-01-20 DIAGNOSIS — I10 ESSENTIAL HYPERTENSION: ICD-10-CM

## 2020-01-20 DIAGNOSIS — E66.9 OBESITY (BMI 30.0-34.9): ICD-10-CM

## 2020-01-20 DIAGNOSIS — E11.21 TYPE 2 DIABETES MELLITUS WITH DIABETIC NEPHROPATHY, WITHOUT LONG-TERM CURRENT USE OF INSULIN (HCC): Primary | ICD-10-CM

## 2020-01-20 LAB
HBA1C MFR BLD HPLC: 6 %
MICROALBUMIN UR TEST STR-MCNC: 30 MG/L
MICROALBUMIN/CREAT RATIO POC: <30 MG/G

## 2020-01-20 NOTE — PROGRESS NOTES
Kayla Zarate is a 67 y.o. female (: 1940) presenting to address:    Chief Complaint   Patient presents with    Diabetes     follow up       Vitals:    20 1053   BP: 118/56   Pulse: 74   Resp: 16   Temp: 97.5 °F (36.4 °C)   TempSrc: Oral   SpO2: 100%   Weight: 168 lb 6.4 oz (76.4 kg)   Height: 5' 1\" (1.549 m)   PainSc:   4   PainLoc: Back       Hearing/Vision:   No exam data present    Learning Assessment:     Learning Assessment 2018   PRIMARY LEARNER Patient   HIGHEST LEVEL OF EDUCATION - PRIMARY LEARNER  4 YEARS OF COLLEGE   BARRIERS PRIMARY LEARNER NONE   CO-LEARNER CAREGIVER No   PRIMARY LANGUAGE ENGLISH   LEARNER PREFERENCE PRIMARY READING   ANSWERED BY patient   RELATIONSHIP SELF     Depression Screening:     3 most recent PHQ Screens 2018   Little interest or pleasure in doing things Not at all   Feeling down, depressed, irritable, or hopeless Not at all   Total Score PHQ 2 0     Fall Risk Assessment:     Fall Risk Assessment, last 12 mths 2020   Able to walk? Yes   Fall in past 12 months? No     Abuse Screening:     Abuse Screening Questionnaire 6/3/2019   Do you ever feel afraid of your partner? N   Are you in a relationship with someone who physically or mentally threatens you? N   Is it safe for you to go home? Y     Coordination of Care Questionaire:   1. Have you been to the ER, urgent care clinic since your last visit? Hospitalized since your last visit? NO    2. Have you seen or consulted any other health care providers outside of the [address] [place] [address] since your last visit? Include any pap smears or colon screening. YES    Advanced Directive:   1. Do you have an Advanced Directive? NO    2. Would you like information on Advanced Directives?  NO

## 2020-01-20 NOTE — TELEPHONE ENCOUNTER
Informed pt to get labs completed from her visit today and when she schedules [facility] for June 2020, labs will be ordered for that visit.

## 2020-01-20 NOTE — PROGRESS NOTES
Immunization of the Flu vaccine was administered 1/20/2020 by Isabella Bowling. Patient tolerated procedure well. No reactions noted.

## 2020-01-20 NOTE — TELEPHONE ENCOUNTER
Please remind Ms. Isabella Wesley that the plan was for her to have lab studies drawn after her appointment this morning and to schedule her Medicare wellness evaluation and follow-up in June with plans for additional fasting lab studies prior to that appointment. It does not appear that she had her lab drawn this morning and there is no evidence that she scheduled her appointment for June. I will not be able to order the lab for June until she has had the lab that was ordered today drawn.

## 2020-01-20 NOTE — PATIENT INSTRUCTIONS
Continue current medications Lab today disposition pending lab results if indicated Avoid dietary salt, starch and sugar and is much as possible follow a program of regular aerobic exercise Schedule follow-up and Medicare wellness evaluation in 5 months and come by for fasting lab a few days prior to that appointment Follow-up sooner with any problems Low Back Pain: Exercises Introduction Here are some examples of exercises for you to try. The exercises may be suggested for a condition or for rehabilitation. Start each exercise slowly. Ease off the exercises if you start to have pain. You will be told when to start these exercises and which ones will work best for you. How to do the exercises Press-up 1. Lie on your stomach, supporting your body with your forearms. 2. Press your elbows down into the floor to raise your upper back. As you do this, relax your stomach muscles and allow your back to arch without using your back muscles. As your press up, do not let your hips or pelvis come off the floor. 3. Hold for 15 to 30 seconds, then relax. 4. Repeat 2 to 4 times. Alternate arm and leg (bird dog) exercise 1. Start on the floor, on your hands and knees. 2. Tighten your belly muscles. 3. Raise one leg off the floor, and hold it straight out behind you. Be careful not to let your hip drop down, because that will twist your trunk. 4. Hold for about 6 seconds, then lower your leg and switch to the other leg. 5. Repeat 8 to 12 times on each leg. 6. Over time, work up to holding for 10 to 30 seconds each time. 7. If you feel stable and secure with your leg raised, try raising the opposite arm straight out in front of you at the same time. Knee-to-chest exercise 1. Lie on your back with your knees bent and your feet flat on the floor.  
2. Bring one knee to your chest, keeping the other foot flat on the floor (or keeping the other leg straight, whichever feels better on your lower back). 
3. Keep your lower back pressed to the floor. Hold for at least 15 to 30 seconds. 4. Relax, and lower the knee to the starting position. 5. Repeat with the other leg. Repeat 2 to 4 times with each leg. 6. To get more stretch, put your other leg flat on the floor while pulling your knee to your chest. 
 
Curl-ups 1. Lie on the floor on your back with your knees bent at a 90-degree angle. Your feet should be flat on the floor, about 12 inches from your buttocks. 2. Cross your arms over your chest. If this bothers your neck, try putting your hands behind your neck (not your head), with your elbows spread apart. 3. Slowly tighten your belly muscles and raise your shoulder blades off the floor. 4. Keep your head in line with your body, and do not press your chin to your chest. 
5. Hold this position for 1 or 2 seconds, then slowly lower yourself back down to the floor. 6. Repeat 8 to 12 times. Pelvic tilt exercise 1. Lie on your back with your knees bent. 2. \"Brace\" your stomach. This means to tighten your muscles by pulling in and imagining your belly button moving toward your spine. You should feel like your back is pressing to the floor and your hips and pelvis are rocking back. 3. Hold for about 6 seconds while you breathe smoothly. 4. Repeat 8 to 12 times. Heel dig bridging 1. Lie on your back with both knees bent and your ankles bent so that only your heels are digging into the floor. Your knees should be bent about 90 degrees. 2. Then push your heels into the floor, squeeze your buttocks, and lift your hips off the floor until your shoulders, hips, and knees are all in a straight line. 3. Hold for about 6 seconds as you continue to breathe normally, and then slowly lower your hips back down to the floor and rest for up to 10 seconds. 4. Do 8 to 12 repetitions. Hamstring stretch in doorway 1. Lie on your back in a doorway, with one leg through the open door. 2. Slide your leg up the wall to straighten your knee. You should feel a gentle stretch down the back of your leg. 3. Hold the stretch for at least 15 to 30 seconds. Do not arch your back, point your toes, or bend either knee. Keep one heel touching the floor and the other heel touching the wall. 4. Repeat with your other leg. 5. Do 2 to 4 times for each leg. Hip flexor stretch 1. Kneel on the floor with one knee bent and one leg behind you. Place your forward knee over your foot. Keep your other knee touching the floor. 2. Slowly push your hips forward until you feel a stretch in the upper thigh of your rear leg. 3. Hold the stretch for at least 15 to 30 seconds. Repeat with your other leg. 4. Do 2 to 4 times on each side. Wall sit 1. Stand with your back 10 to 12 inches away from a wall. 2. Lean into the wall until your back is flat against it. 3. Slowly slide down until your knees are slightly bent, pressing your lower back into the wall. 4. Hold for about 6 seconds, then slide back up the wall. 5. Repeat 8 to 12 times. Follow-up care is a key part of your treatment and safety. Be sure to make and go to all appointments, and call your doctor if you are having problems. It's also a good idea to know your test results and keep a list of the medicines you take. Where can you learn more? Go to http://levy-tasha.info/. Enter Q030 in the search box to learn more about \"Low Back Pain: Exercises. \" Current as of: June 26, 2019 Content Version: 12.2 © 4524-8151 [employer]. Care instructions adapted under license by [employer] (which disclaims liability or warranty for this information). If you have questions about a medical condition or this instruction, always ask your healthcare professional. [address] any warranty or liability for your use of this information.

## 2020-01-21 ENCOUNTER — HOSPITAL ENCOUNTER (OUTPATIENT)
Dept: LAB | Age: 80
Discharge: HOME | End: 2020-01-21
Payer: MEDICARE

## 2020-01-21 DIAGNOSIS — I10 ESSENTIAL HYPERTENSION: ICD-10-CM

## 2020-01-21 DIAGNOSIS — E11.21 TYPE 2 DIABETES MELLITUS WITH DIABETIC NEPHROPATHY, WITHOUT LONG-TERM CURRENT USE OF INSULIN (HCC): ICD-10-CM

## 2020-01-21 LAB
ANION GAP SERPL CALC-SCNC: 5 MMOL/L (ref 3–18)
BUN SERPL-MCNC: 14 MG/DL (ref 7–18)
BUN/CREAT SERPL: 16 (ref 12–20)
CALCIUM SERPL-MCNC: 9.5 MG/DL (ref 8.5–10.1)
CHLORIDE SERPL-SCNC: 107 MMOL/L (ref 100–111)
CHOLEST SERPL-MCNC: 200 MG/DL
CO2 SERPL-SCNC: 28 MMOL/L (ref 21–32)
CREAT SERPL-MCNC: 0.88 MG/DL (ref 0.6–1.3)
GLUCOSE SERPL-MCNC: 97 MG/DL (ref 74–99)
HDLC SERPL-MCNC: 112 MG/DL (ref 40–60)
HDLC SERPL: 1.8 {RATIO} (ref 0–5)
LDLC SERPL CALC-MCNC: 69 MG/DL (ref 0–100)
LIPID PROFILE,FLP: ABNORMAL
POTASSIUM SERPL-SCNC: 4.3 MMOL/L (ref 3.5–5.5)
SODIUM SERPL-SCNC: 140 MMOL/L (ref 136–145)
TRIGL SERPL-MCNC: 95 MG/DL (ref ?–150)
VLDLC SERPL CALC-MCNC: 19 MG/DL

## 2020-01-21 PROCEDURE — 36415 COLL VENOUS BLD VENIPUNCTURE: CPT

## 2020-01-21 PROCEDURE — 80048 BASIC METABOLIC PNL TOTAL CA: CPT

## 2020-01-21 PROCEDURE — 80061 LIPID PANEL: CPT

## 2020-01-22 DIAGNOSIS — I10 ESSENTIAL HYPERTENSION: ICD-10-CM

## 2020-01-22 DIAGNOSIS — K21.9 GASTROESOPHAGEAL REFLUX DISEASE WITHOUT ESOPHAGITIS: ICD-10-CM

## 2020-01-22 DIAGNOSIS — E11.21 TYPE 2 DIABETES MELLITUS WITH DIABETIC NEPHROPATHY, WITHOUT LONG-TERM CURRENT USE OF INSULIN (HCC): Primary | ICD-10-CM

## 2020-03-30 DIAGNOSIS — I10 ESSENTIAL HYPERTENSION: ICD-10-CM

## 2020-03-30 RX ORDER — NADOLOL 40 MG/1
TABLET ORAL
Qty: 90 TAB | Refills: 4 | Status: SHIPPED | OUTPATIENT
Start: 2020-03-30 | End: 2021-06-04

## 2020-03-30 NOTE — TELEPHONE ENCOUNTER
Requested Prescriptions     Pending Prescriptions Disp Refills    nadoloL (CORGARD) 40 mg tablet 90 Tab 3     Pt called to request a refill because she is almost out. Please advise     No future appointments.

## 2020-04-27 DIAGNOSIS — E11.21 TYPE 2 DIABETES MELLITUS WITH DIABETIC NEPHROPATHY, WITHOUT LONG-TERM CURRENT USE OF INSULIN (HCC): Primary | ICD-10-CM

## 2020-04-27 NOTE — TELEPHONE ENCOUNTER
Last seen: 1/20/2020  Next O/V: not scheduled  Last filled: new Rx, these strips are covered by pt's insurance and she needs to get them from her local pharmacy.

## 2020-05-06 DIAGNOSIS — E11.21 TYPE 2 DIABETES MELLITUS WITH DIABETIC NEPHROPATHY, WITHOUT LONG-TERM CURRENT USE OF INSULIN (HCC): Primary | ICD-10-CM

## 2020-05-06 NOTE — TELEPHONE ENCOUNTER
Pt needs a new script for glucose blood VI test strips. The following medication will no longer be covered by her insurance caresens n test strips.  The new script needs to be for freestyle lite (glucose blood VI test strips and sent to the pharmacy listed in pt chart

## 2020-05-07 RX ORDER — INSULIN PUMP SYRINGE, 3 ML
EACH MISCELLANEOUS
Qty: 1 KIT | Refills: 0 | Status: SHIPPED | OUTPATIENT
Start: 2020-05-07

## 2020-05-07 RX ORDER — LANCETS 28 GAUGE
EACH MISCELLANEOUS
Qty: 100 LANCET | Refills: 4 | Status: SHIPPED | OUTPATIENT
Start: 2020-05-07 | End: 2022-07-13 | Stop reason: SDUPTHER

## 2020-05-07 NOTE — TELEPHONE ENCOUNTER
Pt called stating that she needs a meter and lancets to go with the freestyle test strips that were already sent to the pharmacy.  Please advise

## 2020-05-11 DIAGNOSIS — B00.9 HERPES SIMPLEX: ICD-10-CM

## 2020-05-11 DIAGNOSIS — I10 ESSENTIAL HYPERTENSION: ICD-10-CM

## 2020-05-11 NOTE — TELEPHONE ENCOUNTER
Requested Prescriptions     Pending Prescriptions Disp Refills    valACYclovir (VALTREX) 500 mg tablet 180 Tab 3     Sig: Take 1 Tab by mouth two (2) times a day.  felodipine (PLENDIL SR) 5 mg 24 hr tablet 90 Tab 3     Sig: Take 1 Tab by mouth daily.

## 2020-05-12 RX ORDER — FELODIPINE 5 MG/1
5 TABLET, EXTENDED RELEASE ORAL DAILY
Qty: 90 TAB | Refills: 3 | Status: SHIPPED | OUTPATIENT
Start: 2020-05-12 | End: 2021-05-10 | Stop reason: SDUPTHER

## 2020-05-12 RX ORDER — VALACYCLOVIR HYDROCHLORIDE 500 MG/1
500 TABLET, FILM COATED ORAL 2 TIMES DAILY
Qty: 180 TAB | Refills: 3 | Status: SHIPPED | OUTPATIENT
Start: 2020-05-12 | End: 2021-05-10 | Stop reason: SDUPTHER

## 2020-05-12 NOTE — TELEPHONE ENCOUNTER
Last visit: 1/20/2020  Next O/V: not scheduled  Last filled:    5/28/2019; plendil SR 5 mg 24 hr tab; qty 90 w/3 refills  Valtrex 500 mg tab; qty 180 w/3 refills

## 2020-05-13 RX ORDER — LOSARTAN POTASSIUM 50 MG/1
TABLET ORAL
Qty: 90 TAB | Refills: 3 | Status: SHIPPED | OUTPATIENT
Start: 2020-05-13 | End: 2021-05-04

## 2020-05-27 ENCOUNTER — OFFICE VISIT (OUTPATIENT)
Dept: FAMILY MEDICINE CLINIC | Age: 80
End: 2020-05-27

## 2020-05-27 VITALS
DIASTOLIC BLOOD PRESSURE: 70 MMHG | BODY MASS INDEX: 30.43 KG/M2 | OXYGEN SATURATION: 98 % | TEMPERATURE: 98.3 F | SYSTOLIC BLOOD PRESSURE: 122 MMHG | HEART RATE: 66 BPM | HEIGHT: 61 IN | RESPIRATION RATE: 15 BRPM | WEIGHT: 161.2 LBS

## 2020-05-27 DIAGNOSIS — E11.21 TYPE 2 DIABETES MELLITUS WITH DIABETIC NEPHROPATHY, WITHOUT LONG-TERM CURRENT USE OF INSULIN (HCC): Primary | ICD-10-CM

## 2020-05-27 DIAGNOSIS — Z78.0 ASYMPTOMATIC POSTMENOPAUSAL ESTROGEN DEFICIENCY: ICD-10-CM

## 2020-05-27 DIAGNOSIS — E66.9 OBESITY (BMI 30.0-34.9): ICD-10-CM

## 2020-05-27 DIAGNOSIS — F32.9 MAJOR DEPRESSIVE DISORDER WITH SINGLE EPISODE, REMISSION STATUS UNSPECIFIED: ICD-10-CM

## 2020-05-27 DIAGNOSIS — I10 ESSENTIAL HYPERTENSION: ICD-10-CM

## 2020-05-27 LAB — HBA1C MFR BLD HPLC: 5.7 %

## 2020-05-27 NOTE — PATIENT INSTRUCTIONS
Continue current medications but stop glimepiride Continue excellent work with diet and exercise Monitor glucose levels a few times a week and report levels consistently at or higher than 150 Consider Shingrix immunization to help prevent shingles available at the pharmacy Expect a call about bone density scan which will hopefully be scheduled on the same day as the next mammogram 
Schedule follow-up and Medicare wellness in 6 months with fasting lab a few days prior to that appointment Follow-up sooner with any problems

## 2020-05-27 NOTE — PROGRESS NOTES
Bryan Montes is a 67 y.o. female (: 1940) presenting to address:    Chief Complaint   Patient presents with    Diabetes       Vitals:    20 1400   BP: 122/70   Pulse: 66   Resp: 15   Temp: 98.3 °F (36.8 °C)   TempSrc: Oral   SpO2: 98%   Weight: 161 lb 3.2 oz (73.1 kg)   Height: 5' 1\" (1.549 m)   PainSc:   0 - No pain       Hearing/Vision:   No exam data present    Learning Assessment:     Learning Assessment 2018   PRIMARY LEARNER Patient   HIGHEST LEVEL OF EDUCATION - PRIMARY LEARNER  4 YEARS OF COLLEGE   BARRIERS PRIMARY LEARNER NONE   CO-LEARNER CAREGIVER No   PRIMARY LANGUAGE ENGLISH   LEARNER PREFERENCE PRIMARY READING   ANSWERED BY patient   RELATIONSHIP SELF     Depression Screening:     3 most recent PHQ Screens 2018   Little interest or pleasure in doing things Not at all   Feeling down, depressed, irritable, or hopeless Not at all   Total Score PHQ 2 0     Fall Risk Assessment:     Fall Risk Assessment, last 12 mths 2020   Able to walk? Yes   Fall in past 12 months? No     Abuse Screening:     Abuse Screening Questionnaire 6/3/2019   Do you ever feel afraid of your partner? N   Are you in a relationship with someone who physically or mentally threatens you? N   Is it safe for you to go home? Y     Coordination of Care Questionaire:   1. Have you been to the ER, urgent care clinic since your last visit? Hospitalized since your last visit? NO    2. Have you seen or consulted any other health care providers outside of the [address] [place] [address] since your last visit? Include any pap smears or colon screening. NO    Advanced Directive:   1. Do you have an Advanced Directive? NO    2. Would you like information on Advanced Directives?  NO

## 2020-05-29 ENCOUNTER — TELEPHONE (OUTPATIENT)
Dept: FAMILY MEDICINE CLINIC | Age: 80
End: 2020-05-29

## 2020-05-29 DIAGNOSIS — Z12.31 BREAST CANCER SCREENING BY MAMMOGRAM: Primary | ICD-10-CM

## 2020-05-29 NOTE — TELEPHONE ENCOUNTER
Patient bone density and her mammogram done on the same day.  She is scheduled on June 30th to have her mammogram.     Fax [phone]

## 2020-05-29 NOTE — TELEPHONE ENCOUNTER
Pt has scheduled bone density and mammogram tests on 6/30/2020; please order a mammogram so I can fax to [facility].

## 2020-07-01 ENCOUNTER — TELEPHONE (OUTPATIENT)
Dept: FAMILY MEDICINE CLINIC | Age: 80
End: 2020-07-01

## 2020-07-01 NOTE — TELEPHONE ENCOUNTER
Please advise MsKev Bernard Dimitry that her bone density scan when compared to the previous study from 9/16/2013 shows bone mineral density measures within normal limits and no statistically significant interval decrease in bone mineral densities compared with the prior study.

## 2020-07-20 ENCOUNTER — TELEPHONE (OUTPATIENT)
Dept: FAMILY MEDICINE CLINIC | Age: 80
End: 2020-07-20

## 2020-07-20 NOTE — TELEPHONE ENCOUNTER
Please advise that her calcium level is already ordered for her next blood draw before her Medicare wellness evaluation in November.   There is no need to check it sooner than that

## 2020-07-20 NOTE — TELEPHONE ENCOUNTER
Patient called stating that she went to see her Orthopedic doctor and he suggested that she get her calcium levels checked due to her taking Advil everyday. Patient would like to know if Twyla Rivera would suggest this for her.

## 2020-07-25 DIAGNOSIS — Z78.0 ASYMPTOMATIC POSTMENOPAUSAL ESTROGEN DEFICIENCY: ICD-10-CM

## 2020-11-04 ENCOUNTER — APPOINTMENT (OUTPATIENT)
Dept: FAMILY MEDICINE CLINIC | Age: 80
End: 2020-11-04

## 2020-11-04 ENCOUNTER — HOSPITAL ENCOUNTER (OUTPATIENT)
Dept: LAB | Age: 80
Discharge: HOME | End: 2020-11-04
Payer: MEDICARE

## 2020-11-04 DIAGNOSIS — I10 ESSENTIAL HYPERTENSION: ICD-10-CM

## 2020-11-04 DIAGNOSIS — F32.9 MAJOR DEPRESSIVE DISORDER WITH SINGLE EPISODE, REMISSION STATUS UNSPECIFIED: ICD-10-CM

## 2020-11-04 DIAGNOSIS — E11.21 TYPE 2 DIABETES MELLITUS WITH DIABETIC NEPHROPATHY, WITHOUT LONG-TERM CURRENT USE OF INSULIN (HCC): ICD-10-CM

## 2020-11-04 LAB
ALBUMIN SERPL-MCNC: 3.9 G/DL (ref 3.4–5)
ALBUMIN/GLOB SERPL: 1.1 {RATIO} (ref 0.8–1.7)
ALP SERPL-CCNC: 126 U/L (ref 45–117)
ALT SERPL-CCNC: 23 U/L (ref 13–56)
ANION GAP SERPL CALC-SCNC: 6 MMOL/L (ref 3–18)
APPEARANCE UR: CLEAR
AST SERPL-CCNC: 22 U/L (ref 10–38)
BACTERIA URNS QL MICRO: NEGATIVE /HPF
BASOPHILS # BLD: 0 K/UL (ref 0–0.1)
BASOPHILS NFR BLD: 1 % (ref 0–2)
BILIRUB SERPL-MCNC: 0.3 MG/DL (ref 0.2–1)
BILIRUB UR QL: NEGATIVE
BUN SERPL-MCNC: 12 MG/DL (ref 7–18)
BUN/CREAT SERPL: 15 (ref 12–20)
CALCIUM SERPL-MCNC: 9.4 MG/DL (ref 8.5–10.1)
CHLORIDE SERPL-SCNC: 110 MMOL/L (ref 100–111)
CHOLEST SERPL-MCNC: 231 MG/DL
CO2 SERPL-SCNC: 29 MMOL/L (ref 21–32)
COLOR UR: YELLOW
CREAT SERPL-MCNC: 0.8 MG/DL (ref 0.6–1.3)
CREAT UR-MCNC: 26 MG/DL (ref 30–125)
DIFFERENTIAL METHOD BLD: NORMAL
EOSINOPHIL # BLD: 0.1 K/UL (ref 0–0.4)
EOSINOPHIL NFR BLD: 2 % (ref 0–5)
EPITH CASTS URNS QL MICRO: NEGATIVE /LPF (ref 0–5)
ERYTHROCYTE [DISTWIDTH] IN BLOOD BY AUTOMATED COUNT: 14.2 % (ref 11.6–14.5)
EST. AVERAGE GLUCOSE BLD GHB EST-MCNC: 123 MG/DL
GLOBULIN SER CALC-MCNC: 3.7 G/DL (ref 2–4)
GLUCOSE SERPL-MCNC: 108 MG/DL (ref 74–99)
GLUCOSE UR STRIP.AUTO-MCNC: NEGATIVE MG/DL
HBA1C MFR BLD: 5.9 % (ref 4.2–5.6)
HCT VFR BLD AUTO: 38.8 % (ref 35–45)
HDLC SERPL-MCNC: 127 MG/DL (ref 40–60)
HDLC SERPL: 1.8 {RATIO} (ref 0–5)
HGB BLD-MCNC: 12.9 G/DL (ref 12–16)
HGB UR QL STRIP: NEGATIVE
KETONES UR QL STRIP.AUTO: NEGATIVE MG/DL
LDLC SERPL CALC-MCNC: 90.8 MG/DL (ref 0–100)
LEUKOCYTE ESTERASE UR QL STRIP.AUTO: ABNORMAL
LIPID PROFILE,FLP: ABNORMAL
LYMPHOCYTES # BLD: 2.2 K/UL (ref 0.9–3.6)
LYMPHOCYTES NFR BLD: 45 % (ref 21–52)
MCH RBC QN AUTO: 29.2 PG (ref 24–34)
MCHC RBC AUTO-ENTMCNC: 33.2 G/DL (ref 31–37)
MCV RBC AUTO: 87.8 FL (ref 74–97)
MICROALBUMIN UR-MCNC: 1.07 MG/DL (ref 0–3)
MICROALBUMIN/CREAT UR-RTO: 41 MG/G (ref 0–30)
MONOCYTES # BLD: 0.5 K/UL (ref 0.05–1.2)
MONOCYTES NFR BLD: 10 % (ref 3–10)
NEUTS SEG # BLD: 2.1 K/UL (ref 1.8–8)
NEUTS SEG NFR BLD: 42 % (ref 40–73)
NITRITE UR QL STRIP.AUTO: NEGATIVE
PH UR STRIP: 6 [PH] (ref 5–8)
PLATELET # BLD AUTO: 385 K/UL (ref 135–420)
PMV BLD AUTO: 10.3 FL (ref 9.2–11.8)
POTASSIUM SERPL-SCNC: 4.3 MMOL/L (ref 3.5–5.5)
PROT SERPL-MCNC: 7.6 G/DL (ref 6.4–8.2)
PROT UR STRIP-MCNC: NEGATIVE MG/DL
RBC # BLD AUTO: 4.42 M/UL (ref 4.2–5.3)
RBC #/AREA URNS HPF: NEGATIVE /HPF (ref 0–5)
SODIUM SERPL-SCNC: 145 MMOL/L (ref 136–145)
SP GR UR REFRACTOMETRY: 1.01 (ref 1–1.03)
TRIGL SERPL-MCNC: 66 MG/DL (ref ?–150)
TSH SERPL DL<=0.05 MIU/L-ACNC: 1.28 UIU/ML (ref 0.36–3.74)
UROBILINOGEN UR QL STRIP.AUTO: 0.2 EU/DL (ref 0.2–1)
VLDLC SERPL CALC-MCNC: 13.2 MG/DL
WBC # BLD AUTO: 4.9 K/UL (ref 4.6–13.2)
WBC URNS QL MICRO: NORMAL /HPF (ref 0–4)

## 2020-11-04 PROCEDURE — 82043 UR ALBUMIN QUANTITATIVE: CPT

## 2020-11-04 PROCEDURE — 80061 LIPID PANEL: CPT

## 2020-11-04 PROCEDURE — 81001 URINALYSIS AUTO W/SCOPE: CPT

## 2020-11-04 PROCEDURE — 80053 COMPREHEN METABOLIC PANEL: CPT

## 2020-11-04 PROCEDURE — 83036 HEMOGLOBIN GLYCOSYLATED A1C: CPT

## 2020-11-04 PROCEDURE — 84443 ASSAY THYROID STIM HORMONE: CPT

## 2020-11-04 PROCEDURE — 36415 COLL VENOUS BLD VENIPUNCTURE: CPT

## 2020-11-04 PROCEDURE — 85025 COMPLETE CBC W/AUTO DIFF WBC: CPT

## 2020-11-08 NOTE — PROGRESS NOTES
HISTORY OF PRESENT ILLNESS  Edi Hines is a 79 y.o. female. 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 , Disp: 90 Tab, Rfl: 3    Blood-Glucose Meter (FreeStyle Lite Meter) monitoring kit, Use for daily glucose checks E 11.21, Disp: 1 Kit, Rfl: 0    lancets (FreeStyle Lancets) 28 gauge misc, Use for daily glucose checks E 11.21, Disp: 100 Lancet, Rfl: 4    glucose blood VI test strips (FreeStyle Lite Strips) strip, Use for daily glucose checks E 11.21, Disp: 100 Strip, Rfl: 4    nadoloL (CORGARD) 40 mg tablet, TAKE 1 TABLET DAILY, Disp: 90 Tab, Rfl: 4    aspirin delayed-release 81 mg tablet, Take  by mouth daily. , Disp: , Rfl:     OLANZapine (ZYPREXA) 5 mg tablet, Take  by mouth two (2) times a day., Disp: , Rfl:     deutetrabenazine (AUSTEDO) 12 mg tab, Take 16 mg by mouth two (2) times a day., Disp: , Rfl:      Allergies   Allergen Reactions    Gabapentin Other (comments)     Confusion      Resperal-Dm [Brompheniramine-Pseudoeph-Dm] Other (comments)     Difficulty standing    Zocor [Simvastatin] Other (comments)     Muscle pain       Review of Systems   Constitutional: Negative for chills, fever and weight loss. HENT: Negative for congestion, hearing loss and sore throat. Eyes: Negative for blurred vision and double vision. Respiratory: Negative for cough, shortness of breath and wheezing. Cardiovascular: Negative for chest pain, palpitations and leg swelling. Gastrointestinal: Negative for abdominal pain, blood in stool, constipation, diarrhea, heartburn, melena, nausea and vomiting. Genitourinary: Negative for dysuria, frequency and urgency. Musculoskeletal: Negative for joint pain and myalgias. Skin: Negative for itching and rash. Neurological: Negative for dizziness, tingling, sensory change, focal weakness and headaches. Endo/Heme/Allergies: Negative for environmental allergies and polydipsia. Psychiatric/Behavioral: Negative for depression. The patient is not nervous/anxious and does not have insomnia. Visit Vitals  /72 (BP 1 Location: Left arm, BP Patient Position: Sitting)   Pulse 70   Temp 98 °F (36.7 °C) (Temporal)   Resp 15   Ht 5' 1\" (1.549 m)   Wt 151 lb (68.5 kg)   SpO2 98%   BMI 28.53 kg/m²       Physical Exam  Vitals signs and nursing note reviewed. Constitutional:       General: She is not in acute distress. Appearance: Normal appearance. She is not ill-appearing. Comments: Intentional 10 pound weight loss in the past 6 months   HENT:      Head: Normocephalic. Right Ear: Tympanic membrane, ear canal and external ear normal.      Left Ear: Tympanic membrane, ear canal and external ear normal.   Eyes:      Extraocular Movements: Extraocular movements intact. Conjunctiva/sclera: Conjunctivae normal.      Pupils: Pupils are equal, round, and reactive to light. Neck:      Musculoskeletal: Neck supple. Vascular: No carotid bruit. Cardiovascular:      Rate and Rhythm: Normal rate and regular rhythm. Heart sounds: Normal heart sounds.    Pulmonary:      Effort: Pulmonary effort is normal.      Breath sounds: Normal breath sounds. Abdominal:      Palpations: Abdomen is soft. Tenderness: There is no abdominal tenderness. Musculoskeletal:         General: No deformity. Right lower leg: No edema. Left lower leg: No edema. Skin:     General: Skin is warm and dry. Neurological:      General: No focal deficit present. Mental Status: She is alert and oriented to person, place, and time. Psychiatric:         Mood and Affect: Mood normal.         Behavior: Behavior normal.       Results for Ebb Orf (MRN [id number]) as of 11/8/2020 13:54   Ref. Range 11/4/2020 09:55   Calcium Latest Ref Range: 8.5 - 10.1 MG/DL 9.4   GFR est non-AA Latest Ref Range: >60 ml/min/1.73m2 >60   GFR est AA Latest Ref Range: >60 ml/min/1.73m2 >60   Bilirubin, total Latest Ref Range: 0.2 - 1.0 MG/DL 0.3   Protein, total Latest Ref Range: 6.4 - 8.2 g/dL 7.6   Albumin Latest Ref Range: 3.4 - 5.0 g/dL 3.9   Globulin Latest Ref Range: 2.0 - 4.0 g/dL 3.7   A-G Ratio Latest Ref Range: 0.8 - 1.7   1.1   ALT Latest Ref Range: 13 - 56 U/L 23   AST Latest Ref Range: 10 - 38 U/L 22   Alk. phosphatase Latest Ref Range: 45 - 117 U/L 126 (H)   Triglyceride Latest Ref Range: <150 MG/DL 66   Cholesterol, total Latest Ref Range: <200 MG/ (H)   HDL Cholesterol Latest Ref Range: 40 - 60 MG/ (H)   CHOL/HDL Ratio Latest Ref Range: 0 - 5.0   1.8   VLDL, calculated Latest Units: MG/DL 13.2   LDL, calculated Latest Ref Range: 0 - 100 MG/DL 90.8   Hemoglobin A1c, (calculated) Latest Ref Range: 4.2 - 5.6 % 5.9 (H)   Est. average glucose Latest Units: mg/dL 123   Creatinine, urine Latest Ref Range: 30 - 125 mg/dL 26.00 (L)   Microalbumin,urine random Latest Ref Range: 0 - 3.0 MG/DL 1.07   Microalbumin/Creat. Ratio Latest Ref Range: 0 - 30 mg/g 41 (H)   TSH Latest Ref Range: 0.36 - 3.74 uIU/mL 1.28   Glucose Latest Ref Range: 74 - 99 mg/dL 108 (H)     ASSESSMENT and PLAN    ICD-10-CM ICD-9-CM    1.  Medicare annual wellness visit, subsequent  Z00.00 V70.0    2. Type 2 diabetes mellitus with diabetic nephropathy, without long-term current use of insulin (HCC)  E11.21 250.40 pravastatin (PRAVACHOL) 40 mg tablet     583.81    3. Essential hypertension  I10 401.9    Hypertension well controlled  Type 2 diabetes in excellent control  LDL cholesterol is above goal, previously unable to tolerate simvastatin, trial of pravastatin 40 mg daily  Return for reevaluation in 6 months, sooner with any problems, fasting lab a few days prior to the appointment      Date of Service:  2020   Patient Name:  Edi Hines   Patient :  1940     This is a Subsequent Medicare Annual Wellness Visit providing Personalized Prevention Plan Services (PPPS) (Performed 12 months after initial AWV and PPPS ) [id number]    I have reviewed the patient's medical history in detail and updated the computerized patient record. History     Past Medical History:   Diagnosis Date    Delusion ([facility]     Depression     Diabetes ([facility]     type 2    GERD (gastroesophageal reflux disease)     Hypertension       Past Surgical History:   Procedure Laterality Date    HX BACK SURGERY      lower back surgery, done last year    HX  SECTION      HX COLONOSCOPY  10/24/2019    Scattered diverticuli only, five-year follow-up recommended because of history of polyps    HX THORACIC LAMINECTOMY      HX TONSILLECTOMY       Current Outpatient Medications   Medication Sig Dispense Refill    losartan (COZAAR) 50 mg tablet take 1 tablet by mouth once daily 90 Tab 3    valACYclovir (VALTREX) 500 mg tablet Take 1 Tab by mouth two (2) times a day. 180 Tab 3    felodipine (PLENDIL SR) 5 mg 24 hr tablet Take 1 Tab by mouth daily.  90 Tab 3    Blood-Glucose Meter (FreeStyle Lite Meter) monitoring kit Use for daily glucose checks E 11.21 1 Kit 0    lancets (FreeStyle Lancets) 28 gauge misc Use for daily glucose checks E 11.21 100 Lancet 4    glucose blood VI test strips (FreeStyle Lite Strips) strip Use for daily glucose checks E 11.21 100 Strip 4    nadoloL (CORGARD) 40 mg tablet TAKE 1 TABLET DAILY 90 Tab 4    aspirin delayed-release 81 mg tablet Take  by mouth daily.  OLANZapine (ZYPREXA) 5 mg tablet Take  by mouth two (2) times a day.  deutetrabenazine (AUSTEDO) 12 mg tab Take 16 mg by mouth two (2) times a day.        Allergies   Allergen Reactions    Gabapentin Other (comments)     Confusion      Resperal-Dm [Brompheniramine-Pseudoeph-Dm] Other (comments)     Difficulty standing    Zocor [Simvastatin] Other (comments)     Muscle pain     Family History   Problem Relation Age of Onset    Hypertension Father     Cancer Neg Hx     Diabetes Neg Hx      Social History     Tobacco Use    Smoking status: Former Smoker    Smokeless tobacco: Former User    Tobacco comment: quit 34 years ago   Substance Use Topics    Alcohol use: No     Patient Active Problem List   Diagnosis Code    Type 2 diabetes mellitus with diabetic nephropathy, without long-term current use of insulin ([facility]) E11.21    Essential hypertension I10    Major depressive disorder with single episode F32.9    Gastroesophageal reflux disease without esophagitis K21.9    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, T43.505A    Osteoarthritis of lumbar spine M47.816       Living situation:   -- Lives with family  -- Stairs in home yes    Diet, Lifestyle: nonsmoker    Exercise level: moderately active    Depression Risk Factor Screening:     3 most recent PHQ Screens 7/9/2018   Little interest or pleasure in doing things Not at all   Feeling down, depressed, irritable, or hopeless Not at all   Total Score PHQ 2 0     Alcohol Risk Factor Screening:   Do you average more than 1 drink per night or more than 7 drinks a week:  No    On any one occasion in the past three months have you have had more than 3 drinks containing alcohol:  No    Functional Ability and Level of Safety:     Hearing Loss   Hearing is good. Activities of Daily Living   Self-care. Requires assistance with: no ADLs    Fall Risk     Fall Risk Assessment, last 12 mths 5/27/2020   Able to walk? Yes   Fall in past 12 months? No     Abuse Screen   Patient is not abused    Review of Systems   Review of systems reported in the separate problem-oriented documentation. Physical Examination   Physical exam reported in the separate problem-oriented documentation. Evaluation of Cognitive Function:  Mood/affect:  neutral  Appearance: age appropriate  Family member/caregiver input: no    Patient Care Team:  Luis Burnham MD as PCP - General (Family Medicine)  Luis Burnham MD as PCP - [address] [place] [address] [facility]  Adeola Herrera MD as Consulting Provider (Psychiatry)  Jack Solis MD as Consulting Provider (Anesthesiology)    Advice/Counseling   Education and counseling provided:  Advance directives counseling/discussion      Assessment/Plan       ICD-10-CM ICD-9-CM    1. Medicare annual wellness visit, subsequent  Z00.00 V70.0    2. Type 2 diabetes mellitus with diabetic nephropathy, without long-term current use of insulin (HCC)  E11.21 250.40 pravastatin (PRAVACHOL) 40 mg tablet     583.81    3. Essential hypertension  I10 401.9      Health maintenance:        5-year personalized preventative services plan:  Complete and return advance directives form  Consider Shingrix immunization to reduce the risk of shingles available at the pharmacy-patient reports that she has received Shingrix at the pharmacy  Influenza immunization due now-patient reports having received influenza immunization at the pharmacy in October  Please ask the pharmacy to send immunization records here for documentation  Diabetic eye exam/glaucoma check due at least every 2 years, she reports an evaluation within the past 3 months.   Medicare wellness evaluation November 2021              Taylor White was provided a written 5-year personalized preventive services plan, which was included in her AVS.    Daphne Li MD      PLEASE NOTE:   This document has been produced using voice recognition software. Unrecognized errors in transcription may be present.

## 2020-11-09 ENCOUNTER — OFFICE VISIT (OUTPATIENT)
Dept: FAMILY MEDICINE CLINIC | Age: 80
End: 2020-11-09
Payer: MEDICARE

## 2020-11-09 VITALS
HEART RATE: 70 BPM | TEMPERATURE: 98 F | OXYGEN SATURATION: 98 % | DIASTOLIC BLOOD PRESSURE: 72 MMHG | HEIGHT: 61 IN | BODY MASS INDEX: 28.51 KG/M2 | SYSTOLIC BLOOD PRESSURE: 122 MMHG | RESPIRATION RATE: 15 BRPM | WEIGHT: 151 LBS

## 2020-11-09 DIAGNOSIS — Z00.00 MEDICARE ANNUAL WELLNESS VISIT, SUBSEQUENT: Primary | ICD-10-CM

## 2020-11-09 DIAGNOSIS — I10 ESSENTIAL HYPERTENSION: ICD-10-CM

## 2020-11-09 DIAGNOSIS — E11.21 TYPE 2 DIABETES MELLITUS WITH DIABETIC NEPHROPATHY, WITHOUT LONG-TERM CURRENT USE OF INSULIN (HCC): ICD-10-CM

## 2020-11-09 PROCEDURE — 1090F PRES/ABSN URINE INCON ASSESS: CPT | Performed by: FAMILY MEDICINE

## 2020-11-09 PROCEDURE — G0439 PPPS, SUBSEQ VISIT: HCPCS | Performed by: FAMILY MEDICINE

## 2020-11-09 PROCEDURE — G8427 DOCREV CUR MEDS BY ELIG CLIN: HCPCS | Performed by: FAMILY MEDICINE

## 2020-11-09 PROCEDURE — 99213 OFFICE O/P EST LOW 20 MIN: CPT | Performed by: FAMILY MEDICINE

## 2020-11-09 PROCEDURE — G8536 NO DOC ELDER MAL SCRN: HCPCS | Performed by: FAMILY MEDICINE

## 2020-11-09 PROCEDURE — G9717 DOC PT DX DEP/BP F/U NT REQ: HCPCS | Performed by: FAMILY MEDICINE

## 2020-11-09 PROCEDURE — G8752 SYS BP LESS 140: HCPCS | Performed by: FAMILY MEDICINE

## 2020-11-09 PROCEDURE — G8754 DIAS BP LESS 90: HCPCS | Performed by: FAMILY MEDICINE

## 2020-11-09 PROCEDURE — 1101F PT FALLS ASSESS-DOCD LE1/YR: CPT | Performed by: FAMILY MEDICINE

## 2020-11-09 PROCEDURE — G8417 CALC BMI ABV UP PARAM F/U: HCPCS | Performed by: FAMILY MEDICINE

## 2020-11-09 PROCEDURE — G8399 PT W/DXA RESULTS DOCUMENT: HCPCS | Performed by: FAMILY MEDICINE

## 2020-11-09 PROCEDURE — G0463 HOSPITAL OUTPT CLINIC VISIT: HCPCS | Performed by: FAMILY MEDICINE

## 2020-11-09 RX ORDER — PRAVASTATIN SODIUM 40 MG/1
40 TABLET ORAL
Qty: 90 TAB | Refills: 1 | Status: SHIPPED | OUTPATIENT
Start: 2020-11-09 | End: 2021-05-10 | Stop reason: SDUPTHER

## 2020-11-09 NOTE — PROGRESS NOTES
Zoya Echols is a 79 y.o. female (: 1940) presenting to address:    Chief Complaint   Patient presents with    Annual Wellness Visit       Vitals:    20 1008   BP: 122/72   Pulse: 70   Resp: 15   Temp: 98 °F (36.7 °C)   TempSrc: Temporal   SpO2: 98%   Weight: 151 lb (68.5 kg)   Height: 5' 1\" (1.549 m)   PainSc:   0 - No pain       Hearing/Vision:   No exam data present    Learning Assessment:     Learning Assessment 2018   PRIMARY LEARNER Patient   HIGHEST LEVEL OF EDUCATION - PRIMARY LEARNER  4 YEARS OF COLLEGE   BARRIERS PRIMARY LEARNER NONE   CO-LEARNER CAREGIVER No   PRIMARY LANGUAGE ENGLISH   LEARNER PREFERENCE PRIMARY READING   ANSWERED BY patient   RELATIONSHIP SELF     Depression Screening:     3 most recent PHQ Screens 2018   Little interest or pleasure in doing things Not at all   Feeling down, depressed, irritable, or hopeless Not at all   Total Score PHQ 2 0     Fall Risk Assessment:     Fall Risk Assessment, last 12 mths 2020   Able to walk? Yes   Fall in past 12 months? No     Abuse Screening:     Abuse Screening Questionnaire 6/3/2019   Do you ever feel afraid of your partner? N   Are you in a relationship with someone who physically or mentally threatens you? N   Is it safe for you to go home? Y     Coordination of Care Questionaire:   1. Have you been to the ER, urgent care clinic since your last visit? Hospitalized since your last visit? NO    2. Have you seen or consulted any other health care providers outside of the [address] [place] [address] since your last visit? Include any pap smears or colon screening. NO    Advanced Directive:   1. Do you have an Advanced Directive? NO    2. Would you like information on Advanced Directives?  YES

## 2020-11-09 NOTE — PATIENT INSTRUCTIONS
5-year personalized preventative services plan: 
Complete and return advance directives form Consider Shingrix immunization to reduce the risk of shingles available at the pharmacy Influenza immunization due now Diabetic eye exam/glaucoma check due at least every 2 years Medicare wellness evaluation November 2021 Hypertension well controlled Type 2 diabetes in excellent control LDL cholesterol is above goal, previously unable to tolerate simvastatin, trial of pravastatin 40 mg daily Return for reevaluation in 6 months, sooner with any problems, fasting lab a few days prior to the appointment

## 2021-05-04 RX ORDER — LOSARTAN POTASSIUM 50 MG/1
TABLET ORAL
Qty: 90 TAB | Refills: 3 | Status: SHIPPED | OUTPATIENT
Start: 2021-05-04 | End: 2022-04-30

## 2021-05-07 DIAGNOSIS — I10 ESSENTIAL HYPERTENSION: ICD-10-CM

## 2021-05-07 DIAGNOSIS — Z11.59 NEED FOR HEPATITIS C SCREENING TEST: ICD-10-CM

## 2021-05-07 DIAGNOSIS — E11.21 TYPE 2 DIABETES MELLITUS WITH DIABETIC NEPHROPATHY, WITHOUT LONG-TERM CURRENT USE OF INSULIN (HCC): Primary | ICD-10-CM

## 2021-05-10 ENCOUNTER — APPOINTMENT (OUTPATIENT)
Dept: FAMILY MEDICINE CLINIC | Age: 81
End: 2021-05-10

## 2021-05-10 ENCOUNTER — HOSPITAL ENCOUNTER (OUTPATIENT)
Dept: LAB | Age: 81
Discharge: HOME | End: 2021-05-10
Payer: MEDICARE

## 2021-05-10 DIAGNOSIS — B00.9 HERPES SIMPLEX: ICD-10-CM

## 2021-05-10 DIAGNOSIS — I10 ESSENTIAL HYPERTENSION: ICD-10-CM

## 2021-05-10 DIAGNOSIS — E11.21 TYPE 2 DIABETES MELLITUS WITH DIABETIC NEPHROPATHY, WITHOUT LONG-TERM CURRENT USE OF INSULIN (HCC): ICD-10-CM

## 2021-05-10 DIAGNOSIS — Z11.59 NEED FOR HEPATITIS C SCREENING TEST: ICD-10-CM

## 2021-05-10 LAB
ANION GAP SERPL CALC-SCNC: 6 MMOL/L (ref 3–18)
BUN SERPL-MCNC: 12 MG/DL (ref 7–18)
BUN/CREAT SERPL: 15 (ref 12–20)
CALCIUM SERPL-MCNC: 8 MG/DL (ref 8.5–10.1)
CHLORIDE SERPL-SCNC: 108 MMOL/L (ref 100–111)
CHOLEST SERPL-MCNC: 197 MG/DL
CO2 SERPL-SCNC: 27 MMOL/L (ref 21–32)
CREAT SERPL-MCNC: 0.78 MG/DL (ref 0.6–1.3)
CREAT UR-MCNC: 45 MG/DL (ref 30–125)
EST. AVERAGE GLUCOSE BLD GHB EST-MCNC: 117 MG/DL
GLUCOSE SERPL-MCNC: 87 MG/DL (ref 74–99)
HBA1C MFR BLD: 5.7 % (ref 4.2–5.6)
HDLC SERPL-MCNC: 132 MG/DL (ref 40–60)
HDLC SERPL: 1.5 {RATIO} (ref 0–5)
LDLC SERPL CALC-MCNC: 53 MG/DL (ref 0–100)
LIPID PROFILE,FLP: ABNORMAL
MICROALBUMIN UR-MCNC: 0.97 MG/DL (ref 0–3)
MICROALBUMIN/CREAT UR-RTO: 22 MG/G (ref 0–30)
POTASSIUM SERPL-SCNC: 4.3 MMOL/L (ref 3.5–5.5)
SODIUM SERPL-SCNC: 141 MMOL/L (ref 136–145)
TRIGL SERPL-MCNC: 60 MG/DL (ref ?–150)
VLDLC SERPL CALC-MCNC: 12 MG/DL

## 2021-05-10 PROCEDURE — 80061 LIPID PANEL: CPT

## 2021-05-10 PROCEDURE — 86803 HEPATITIS C AB TEST: CPT

## 2021-05-10 PROCEDURE — 80048 BASIC METABOLIC PNL TOTAL CA: CPT

## 2021-05-10 PROCEDURE — 36415 COLL VENOUS BLD VENIPUNCTURE: CPT

## 2021-05-10 PROCEDURE — 82043 UR ALBUMIN QUANTITATIVE: CPT

## 2021-05-10 PROCEDURE — 83036 HEMOGLOBIN GLYCOSYLATED A1C: CPT

## 2021-05-10 RX ORDER — VALACYCLOVIR HYDROCHLORIDE 500 MG/1
500 TABLET, FILM COATED ORAL 2 TIMES DAILY
Qty: 180 TAB | Refills: 3 | Status: SHIPPED | OUTPATIENT
Start: 2021-05-10 | End: 2022-06-16

## 2021-05-10 RX ORDER — PRAVASTATIN SODIUM 40 MG/1
40 TABLET ORAL
Qty: 90 TAB | Refills: 3 | Status: SHIPPED | OUTPATIENT
Start: 2021-05-10 | End: 2022-04-30

## 2021-05-10 RX ORDER — FELODIPINE 5 MG/1
5 TABLET, EXTENDED RELEASE ORAL DAILY
Qty: 90 TAB | Refills: 3 | Status: SHIPPED | OUTPATIENT
Start: 2021-05-10 | End: 2022-04-30

## 2021-05-10 NOTE — TELEPHONE ENCOUNTER
Last Office visit:  11/9/2020    Last Filled: Pravachol- 11/9/2020; Qty 90 w/ 1 refill  Valtrex- 5/12/2020; Qty 180 2 times daily, w/ 3 refills  Felodipine- 5/12/2020; Qty 90 w/ 3 refills       Follow up visit:   Future Appointments   Date Time Provider Mateus Graham   5/12/2021  1:30 PM Riley Araya MD [facility]

## 2021-05-10 NOTE — TELEPHONE ENCOUNTER
Requested Prescriptions     Pending Prescriptions Disp Refills    valACYclovir (VALTREX) 500 mg tablet 180 Tab 3     Sig: Take 1 Tab by mouth two (2) times a day.  felodipine (PLENDIL SR) 5 mg 24 hr tablet 90 Tab 3     Sig: Take 1 Tab by mouth daily.  pravastatin (PRAVACHOL) 40 mg tablet 90 Tab 1     Sig: Take 1 Tab by mouth nightly. Pt called to request a refill on her meds. She is almost out of her pravastatin. Please advise.      Future Appointments   Date Time Provider Mateus Graham   5/12/2021  1:30 PM Larraine Spurling, MD [facility]

## 2021-05-11 LAB
HCV AB SER IA-ACNC: 0.12 INDEX
HCV AB SERPL QL IA: NEGATIVE
HCV COMMENT,HCGAC: NORMAL

## 2021-05-12 ENCOUNTER — OFFICE VISIT (OUTPATIENT)
Dept: FAMILY MEDICINE CLINIC | Age: 81
End: 2021-05-12
Payer: MEDICARE

## 2021-05-12 VITALS
SYSTOLIC BLOOD PRESSURE: 118 MMHG | WEIGHT: 145 LBS | DIASTOLIC BLOOD PRESSURE: 70 MMHG | BODY MASS INDEX: 27.38 KG/M2 | HEART RATE: 85 BPM | TEMPERATURE: 98 F | HEIGHT: 61 IN | OXYGEN SATURATION: 100 % | RESPIRATION RATE: 16 BRPM

## 2021-05-12 DIAGNOSIS — E11.21 TYPE 2 DIABETES MELLITUS WITH DIABETIC NEPHROPATHY, WITHOUT LONG-TERM CURRENT USE OF INSULIN (HCC): Primary | ICD-10-CM

## 2021-05-12 DIAGNOSIS — I10 ESSENTIAL HYPERTENSION: ICD-10-CM

## 2021-05-12 PROCEDURE — 99213 OFFICE O/P EST LOW 20 MIN: CPT | Performed by: FAMILY MEDICINE

## 2021-05-12 PROCEDURE — G9717 DOC PT DX DEP/BP F/U NT REQ: HCPCS | Performed by: FAMILY MEDICINE

## 2021-05-12 PROCEDURE — G8536 NO DOC ELDER MAL SCRN: HCPCS | Performed by: FAMILY MEDICINE

## 2021-05-12 PROCEDURE — 1101F PT FALLS ASSESS-DOCD LE1/YR: CPT | Performed by: FAMILY MEDICINE

## 2021-05-12 PROCEDURE — G8399 PT W/DXA RESULTS DOCUMENT: HCPCS | Performed by: FAMILY MEDICINE

## 2021-05-12 PROCEDURE — G8752 SYS BP LESS 140: HCPCS | Performed by: FAMILY MEDICINE

## 2021-05-12 PROCEDURE — 1090F PRES/ABSN URINE INCON ASSESS: CPT | Performed by: FAMILY MEDICINE

## 2021-05-12 PROCEDURE — G8754 DIAS BP LESS 90: HCPCS | Performed by: FAMILY MEDICINE

## 2021-05-12 PROCEDURE — G8427 DOCREV CUR MEDS BY ELIG CLIN: HCPCS | Performed by: FAMILY MEDICINE

## 2021-05-12 PROCEDURE — G8417 CALC BMI ABV UP PARAM F/U: HCPCS | Performed by: FAMILY MEDICINE

## 2021-05-12 NOTE — PATIENT INSTRUCTIONS
Continue current medications: 
Avoid dietary salt, starch and sugar and follow program of regular aerobic exercise Return for lab appointment followed by Medicare wellness evaluation appointment to include a diabetic foot exam in November or sooner with any problems

## 2021-05-12 NOTE — PROGRESS NOTES
HISTORY OF PRESENT ILLNESS  Georges Hall is a 79 y.o. female. She returns for follow-up with a history of type 2 diabetes and hypertension    Mr#: [id number]      Past Medical History:   Diagnosis Date    [facility]     Depression     Diabetes ([facility]     type 2    GERD (gastroesophageal reflux disease)     Hypertension        Past Surgical History:   Procedure Laterality Date    HX BACK SURGERY      lower back surgery, done last year    HX  SECTION      HX COLONOSCOPY  10/24/2019    Scattered diverticuli only, five-year follow-up recommended because of history of polyps    HX THORACIC LAMINECTOMY      HX TONSILLECTOMY         Family History   Problem Relation Age of Onset    Hypertension Father     Cancer Neg Hx     Diabetes Neg Hx        Allergies   Allergen Reactions    Gabapentin Other (comments)     Confusion      Resperal-Dm [Brompheniramine-Pseudoeph-Dm] Other (comments)     Difficulty standing    Zocor [Simvastatin] Other (comments)     Muscle pain       Social History     Tobacco Use   Smoking Status Former Smoker   Smokeless Tobacco Former User   Tobacco Comment    quit 34 years ago       Social History     Substance and Sexual Activity   Alcohol Use No            Patient Active Problem List   Diagnosis Code    Type 2 diabetes mellitus with diabetic nephropathy, without long-term current use of insulin ([facility]) E11.21    Essential hypertension I10    Major depressive disorder with single episode F32.9    Gastroesophageal reflux disease without esophagitis K21.9    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 , Disp: 90 Tab, Rfl: 3    pravastatin (PRAVACHOL) 40 mg tablet, Take 1 Tab by mouth nightly. , Disp: 90 Tab, Rfl: 3    losartan (COZAAR) 50 mg tablet, take 1 tablet by mouth once daily, Disp: 90 Tab, Rfl: 3    Blood-Glucose Meter (FreeStyle Lite Meter) monitoring kit, Use for daily glucose checks E 11.21, Disp: 1 Kit, Rfl: 0    lancets (FreeStyle Lancets) 28 gauge misc, Use for daily glucose checks E 11.21, Disp: 100 Lancet, Rfl: 4    glucose blood VI test strips (FreeStyle Lite Strips) strip, Use for daily glucose checks E 11.21, Disp: 100 Strip, Rfl: 4    nadoloL (CORGARD) 40 mg tablet, TAKE 1 TABLET DAILY, Disp: 90 Tab, Rfl: 4    aspirin delayed-release 81 mg tablet, Take  by mouth daily. , Disp: , Rfl:     OLANZapine (ZYPREXA) 5 mg tablet, Take  by mouth two (2) times a day., Disp: , Rfl:     deutetrabenazine (AUSTEDO) 12 mg tab, Take 16 mg by mouth two (2) times a day., Disp: , Rfl:         Review of Systems   Constitutional: Negative for fever and weight loss. Eyes: Negative for blurred vision. Cardiovascular: Negative for chest pain, palpitations and leg swelling. Genitourinary: Negative for frequency. Neurological: Negative for tingling. Endo/Heme/Allergies: Negative for polydipsia. Visit Vitals  /70 (BP 1 Location: Left upper arm, BP Patient Position: Sitting, BP Cuff Size: Adult)   Pulse 85   Temp 98 °F (36.7 °C) (Temporal)   Resp 16   Ht 5' 1\" (1.549 m)   Wt 145 lb (65.8 kg)   SpO2 100%   BMI 27.40 kg/m²       Physical Exam  Vitals signs and nursing note reviewed. Constitutional:       General: She is not in acute distress. Appearance: Normal appearance. She is well-developed. She is not ill-appearing. HENT:      Head: Normocephalic. Eyes:      Extraocular Movements: Extraocular movements intact. Conjunctiva/sclera: Conjunctivae normal.   Neck:      Musculoskeletal: Neck supple. Cardiovascular:      Rate and Rhythm: Normal rate and regular rhythm. Heart sounds: Normal heart sounds.    Pulmonary:      Effort: Pulmonary effort is normal.      Breath sounds: Normal breath sounds. Skin:     General: Skin is warm and dry. Neurological:      Mental Status: She is alert and oriented to person, place, and time. Psychiatric:         Behavior: Behavior normal.     Diabetic foot exam      Results for Isaías Marroquin (MRN [id number]) as of 5/12/2021 08:20   Ref. Range 11/4/2020 09:55 5/10/2021 09:09   Sodium Latest Ref Range: 136 - 145 mmol/L 145 141   Potassium Latest Ref Range: 3.5 - 5.5 mmol/L 4.3 4.3   Chloride Latest Ref Range: 100 - 111 mmol/L 110 108   CO2 Latest Ref Range: 21 - 32 mmol/L 29 27   Anion gap Latest Ref Range: 3.0 - 18 mmol/L 6 6   Glucose Latest Ref Range: 74 - 99 mg/dL 108 (H) 87   BUN Latest Ref Range: 7.0 - 18 MG/DL 12 12   Creatinine Latest Ref Range: 0.6 - 1.3 MG/DL 0.80 0.78   BUN/Creatinine ratio Latest Ref Range: 12 - 20   15 15   Calcium Latest Ref Range: 8.5 - 10.1 MG/DL 9.4 8.0 (L)   GFR est non-AA Latest Ref Range: >60 ml/min/1.73m2 >60 >60   GFR est AA Latest Ref Range: >60 ml/min/1.73m2 >60 >60   Bilirubin, total Latest Ref Range: 0.2 - 1.0 MG/DL 0.3    Protein, total Latest Ref Range: 6.4 - 8.2 g/dL 7.6    Albumin Latest Ref Range: 3.4 - 5.0 g/dL 3.9    Globulin Latest Ref Range: 2.0 - 4.0 g/dL 3.7    A-G Ratio Latest Ref Range: 0.8 - 1.7   1.1    ALT Latest Ref Range: 13 - 56 U/L 23    AST Latest Ref Range: 10 - 38 U/L 22    Alk.  phosphatase Latest Ref Range: 45 - 117 U/L 126 (H)    Triglyceride Latest Ref Range: <150 MG/DL 66 60   Cholesterol, total Latest Ref Range: <200 MG/ (H) 197   HDL Cholesterol Latest Ref Range: 40 - 60 MG/ (H) 132 (H)   CHOL/HDL Ratio Latest Ref Range: 0 - 5.0   1.8 1.5   VLDL, calculated Latest Units: MG/DL 13.2 12   LDL, calculated Latest Ref Range: 0 - 100 MG/DL 90.8 53   Hemoglobin A1c, (calculated) Latest Ref Range: 4.2 - 5.6 % 5.9 (H) 5.7 (H)   Est. average glucose Latest Units: mg/dL 123 117   Creatinine, urine Latest Ref Range: 30 - 125 mg/dL 26.00 (L) 45.00   Microalbumin,urine random Latest Ref Range: 0 - 3.0 MG/DL 1.07 0.97   The patient requests to defer her diabetic foot exam because of difficulty with the stockings she is wearing today, she will come prepared for the exam at her next appointment  ASSESSMENT and PLAN    ICD-10-CM ICD-9-CM    1. Type 2 diabetes mellitus with diabetic nephropathy, without long-term current use of insulin (HCC)  E11.21 250.40      583.81    2. Essential hypertension  I10 401.9    Assessment:  Type 2 diabetes and hypertension well controlled    Plan:  Continue current medications:  Avoid dietary salt, starch and sugar and follow program of regular aerobic exercise  Return for lab appointment followed by Medicare wellness evaluation appointment to include a diabetic foot exam in November or sooner with any problems    Humaira De MD      PLEASE NOTE:   This document has been produced using voice recognition software. Unrecognized errors in transcription may be present.

## 2021-05-12 NOTE — PROGRESS NOTES
Lm Correa presents today for   Chief Complaint   Patient presents with    Follow-up       Is someone accompanying this pt? no    Is the patient using any DME equipment during OV? no    Depression Screening:  3 most recent PHQ Screens 5/12/2021   Little interest or pleasure in doing things Several days   Feeling down, depressed, irritable, or hopeless Not at all   Total Score PHQ 2 1       Learning Assessment:  Learning Assessment 11/9/2020   PRIMARY LEARNER Patient   HIGHEST LEVEL OF EDUCATION - PRIMARY LEARNER  4 YEARS [address] CAREGIVER No   PRIMARY LANGUAGE ENGLISH    NEED No   LEARNER PREFERENCE PRIMARY LISTENING   ANSWERED BY patient   RELATIONSHIP SELF       Travel Screening:    Travel Screening     Question   Response    In the last month, have you been in contact with someone who was confirmed or suspected to have Ardith Khushbu / COVID-19? No / Unsure    Have you had a COVID-19 viral test in the last 14 days? No    Do you have any of the following new or worsening symptoms? None of these    Have you traveled internationally or domestically in the last month? No      Travel History   Travel since 04/12/21     No documented travel since 04/12/21          Health Maintenance reviewed and discussed and ordered per Provider. Health Maintenance Due   Topic Date Due    Foot Exam Q1  05/27/2021   . Coordination of Care:  1. Have you been to the ER, urgent care clinic since your last visit? Hospitalized since your last visit? no    2. Have you seen or consulted any other health care providers outside of the [address] [place] [address] since your last visit? Include any pap smears or colon screening.  No.

## 2021-05-21 DIAGNOSIS — E11.21 TYPE 2 DIABETES MELLITUS WITH DIABETIC NEPHROPATHY, WITHOUT LONG-TERM CURRENT USE OF INSULIN (HCC): ICD-10-CM

## 2021-05-21 RX ORDER — BLOOD-GLUCOSE METER
KIT MISCELLANEOUS
Qty: 100 STRIP | Refills: 4 | Status: CANCELLED | OUTPATIENT
Start: 2021-05-21

## 2021-06-04 DIAGNOSIS — I10 ESSENTIAL HYPERTENSION: ICD-10-CM

## 2021-06-04 RX ORDER — NADOLOL 40 MG/1
TABLET ORAL
Qty: 90 TABLET | Refills: 4 | Status: SHIPPED | OUTPATIENT
Start: 2021-06-04 | End: 2022-08-31

## 2021-11-15 ENCOUNTER — APPOINTMENT (OUTPATIENT)
Dept: FAMILY MEDICINE CLINIC | Age: 81
End: 2021-11-15

## 2021-11-15 ENCOUNTER — HOSPITAL ENCOUNTER (OUTPATIENT)
Dept: LAB | Age: 81
Discharge: HOME | End: 2021-11-15
Payer: MEDICARE

## 2021-11-15 DIAGNOSIS — E11.21 TYPE 2 DIABETES MELLITUS WITH DIABETIC NEPHROPATHY, WITHOUT LONG-TERM CURRENT USE OF INSULIN (HCC): ICD-10-CM

## 2021-11-15 DIAGNOSIS — I10 ESSENTIAL HYPERTENSION: ICD-10-CM

## 2021-11-15 LAB
ANION GAP SERPL CALC-SCNC: 5 MMOL/L (ref 3–18)
BUN SERPL-MCNC: 18 MG/DL (ref 7–18)
BUN/CREAT SERPL: 21 (ref 12–20)
CALCIUM SERPL-MCNC: 9.4 MG/DL (ref 8.5–10.1)
CHLORIDE SERPL-SCNC: 105 MMOL/L (ref 100–111)
CO2 SERPL-SCNC: 26 MMOL/L (ref 21–32)
CREAT SERPL-MCNC: 0.86 MG/DL (ref 0.6–1.3)
EST. AVERAGE GLUCOSE BLD GHB EST-MCNC: 126 MG/DL
GLUCOSE SERPL-MCNC: 96 MG/DL (ref 74–99)
HBA1C MFR BLD: 6 % (ref 4.2–5.6)
POTASSIUM SERPL-SCNC: 4.1 MMOL/L (ref 3.5–5.5)
SODIUM SERPL-SCNC: 136 MMOL/L (ref 136–145)

## 2021-11-15 PROCEDURE — 80048 BASIC METABOLIC PNL TOTAL CA: CPT

## 2021-11-15 PROCEDURE — 83036 HEMOGLOBIN GLYCOSYLATED A1C: CPT

## 2021-11-16 NOTE — PROGRESS NOTES
HISTORY OF PRESENT ILLNESS  Staci Spencer is a [de-identified] y.o. female. She presents for follow-up with a history of type 2 diabetes with diabetic nephropathy, hypertension and depression followed by psychiatry as well as for Medicare wellness evaluation. She has no new concerns but asked to review her medication so that the indication for each medication is known to her. This was accomplished.     Mr#: [id number]      Past Medical History:   Diagnosis Date    Delusion [facility] [address]     Depression     Diabetes ([facility] [address]     type 2    GERD (gastroesophageal reflux disease)     Hypertension        Past Surgical History:   Procedure Laterality Date    HX BACK SURGERY      lower back surgery, done last year    HX  SECTION      HX COLONOSCOPY  10/24/2019    Scattered diverticuli only, five-year follow-up recommended because of history of polyps    HX THORACIC LAMINECTOMY      HX TONSILLECTOMY         Family History   Problem Relation Age of Onset    Hypertension Father     Cancer Neg Hx     Diabetes Neg Hx        Allergies   Allergen Reactions    Gabapentin Other (comments)     Confusion      Resperal-Dm [Brompheniramine-Pseudoeph-Dm] Other (comments)     Difficulty standing    Zocor [Simvastatin] Other (comments)     Muscle pain       Social History     Tobacco Use   Smoking Status Former Smoker   Smokeless Tobacco Former User   Tobacco Comment    quit 34 years ago       Social History     Substance and Sexual Activity   Alcohol Use No            Patient Active Problem List   Diagnosis Code    Type 2 diabetes mellitus with diabetic nephropathy, without long-term current use of insulin ([facility]) E11.21    Essential hypertension I10    Major depressive disorder with single episode F32.9    Gastroesophageal reflux disease without esophagitis K21.9    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 , Disp: 90 Tab, Rfl: 3    pravastatin (PRAVACHOL) 40 mg tablet, Take 1 Tab by mouth nightly., Disp: 90 Tab, Rfl: 3    losartan (COZAAR) 50 mg tablet, take 1 tablet by mouth once daily, Disp: 90 Tab, Rfl: 3    Blood-Glucose Meter (FreeStyle Lite Meter) monitoring kit, Use for daily glucose checks E 11.21, Disp: 1 Kit, Rfl: 0    lancets (FreeStyle Lancets) 28 gauge misc, Use for daily glucose checks E 11.21, Disp: 100 Lancet, Rfl: 4    glucose blood VI test strips (FreeStyle Lite Strips) strip, Use for daily glucose checks E 11.21, Disp: 100 Strip, Rfl: 4    aspirin delayed-release 81 mg tablet, Take  by mouth daily. , Disp: , Rfl:     OLANZapine (ZYPREXA) 5 mg tablet, Take  by mouth two (2) times a day., Disp: , Rfl:     deutetrabenazine (AUSTEDO) 12 mg tab, Take 16 mg by mouth two (2) times a day., Disp: , Rfl:         Review of Systems   Constitutional: Negative for chills, fever and weight loss. HENT: Negative for congestion, ear pain, hearing loss and sore throat. Eyes: Negative for blurred vision and double vision. Respiratory: Negative for cough, shortness of breath and wheezing. Cardiovascular: Negative for chest pain, palpitations and leg swelling. Gastrointestinal: Negative for abdominal pain, blood in stool, constipation, diarrhea, heartburn, melena, nausea and vomiting. Genitourinary: Negative for dysuria and urgency. Musculoskeletal: Negative for joint pain and myalgias. Skin: Negative for itching and rash. Neurological: Negative for dizziness, tingling, sensory change, focal weakness and headaches. Endo/Heme/Allergies: Negative for environmental allergies. Psychiatric/Behavioral: Negative for depression.  The patient is not nervous/anxious and does not have insomnia. Visit Vitals  /70   Pulse 72   Temp 98.4 °F (36.9 °C) (Temporal)   Resp 16   Ht 5' 1\" (1.549 m)   Wt 149 lb (67.6 kg)   SpO2 98%   BMI 28.15 kg/m²       Physical Exam  Vitals and nursing note reviewed. Constitutional:       General: She is not in acute distress. Appearance: Normal appearance. She is not ill-appearing. HENT:      Head: Normocephalic. Right Ear: Tympanic membrane, ear canal and external ear normal.      Left Ear: Tympanic membrane, ear canal and external ear normal.      Mouth/Throat:      Mouth: Mucous membranes are moist.      Pharynx: Oropharynx is clear. Eyes:      Extraocular Movements: Extraocular movements intact. Conjunctiva/sclera: Conjunctivae normal.      Pupils: Pupils are equal, round, and reactive to light. Neck:      Vascular: No carotid bruit. Cardiovascular:      Rate and Rhythm: Normal rate and regular rhythm. Heart sounds: Normal heart sounds. Pulmonary:      Effort: Pulmonary effort is normal.      Breath sounds: Normal breath sounds. Abdominal:      Palpations: Abdomen is soft. Tenderness: There is no abdominal tenderness. Musculoskeletal:         General: No deformity. Cervical back: Neck supple. Right lower leg: No edema. Left lower leg: No edema. Skin:     General: Skin is warm and dry. Neurological:      Mental Status: She is alert and oriented to person, place, and time.    Psychiatric:         Mood and Affect: Mood normal.         Behavior: Behavior normal.            Diabetic foot exam performed by Susanna Cai MD     Measurement  Response Nurse Comment Physician Comment   Monofilament  R - normal sensation with micro filament  L - normal sensation with micro filament     Pulse DP R - 2+ (normal)  L - 2+ (normal)     Pulse TP R - 2+ (normal)  L - 1+ (weak)     Structural deformity R - None  L - None     Skin Integrity / Deformity R - None  L - None Results for Marleen Kim (MRN [id number]) as of 11/16/2021 13:31   Ref. Range 11/4/2020 09:55 5/10/2021 09:09 11/15/2021 08:08   Sodium Latest Ref Range: 136 - 145 mmol/L 145 141 136   Potassium Latest Ref Range: 3.5 - 5.5 mmol/L 4.3 4.3 4.1   Chloride Latest Ref Range: 100 - 111 mmol/L 110 108 105   CO2 Latest Ref Range: 21 - 32 mmol/L 29 27 26   Anion gap Latest Ref Range: 3.0 - 18 mmol/L 6 6 5   Glucose Latest Ref Range: 74 - 99 mg/dL 108 (H) 87 96   BUN Latest Ref Range: 7.0 - 18 MG/DL 12 12 18   Creatinine Latest Ref Range: 0.6 - 1.3 MG/DL 0.80 0.78 0.86   BUN/Creatinine ratio Latest Ref Range: 12 - 20   15 15 21 (H)   Calcium Latest Ref Range: 8.5 - 10.1 MG/DL 9.4 8.0 (L) 9.4   GFR est non-AA Latest Ref Range: >60 ml/min/1.73m2 >60 >60 >60   GFR est AA Latest Ref Range: >60 ml/min/1.73m2 >60 >60 >60   Bilirubin, total Latest Ref Range: 0.2 - 1.0 MG/DL 0.3     Protein, total Latest Ref Range: 6.4 - 8.2 g/dL 7.6     Albumin Latest Ref Range: 3.4 - 5.0 g/dL 3.9     Globulin Latest Ref Range: 2.0 - 4.0 g/dL 3.7     A-G Ratio Latest Ref Range: 0.8 - 1.7   1.1     ALT Latest Ref Range: 13 - 56 U/L 23     AST Latest Ref Range: 10 - 38 U/L 22     Alk. phosphatase Latest Ref Range: 45 - 117 U/L 126 (H)     Triglyceride Latest Ref Range: <150 MG/DL 66 60    Cholesterol, total Latest Ref Range: <200 MG/ (H) 197    HDL Cholesterol Latest Ref Range: 40 - 60 MG/ (H) 132 (H)    CHOL/HDL Ratio Latest Ref Range: 0 - 5.0   1.8 1.5    VLDL, calculated Latest Units: MG/DL 13.2 12    LDL, calculated Latest Ref Range: 0 - 100 MG/DL 90.8 53    Hemoglobin A1c, (calculated) Latest Ref Range: 4.2 - 5.6 % 5.9 (H) 5.7 (H) 6.0 (H)   Est. average glucose Latest Units: mg/dL 123 117 126   Creatinine, urine Latest Ref Range: 30 - 125 mg/dL 26.00 (L) 45.00    Microalbumin,urine random Latest Ref Range: 0 - 3.0 MG/DL 1.07 0.97    Microalbumin/Creat.  Ratio Latest Ref Range: 0 - 30 mg/g 41 (H) 22    TSH Latest Ref Range: 0.36 - 3.74 uIU/mL 1.28     Hepatitis C virus Ab Latest Ref Range: <0.80 Index  0.12      ASSESSMENT and PLAN    ICD-10-CM ICD-9-CM    1. Medicare annual wellness visit, subsequent  Z00.00 V70.0    2. Type 2 diabetes mellitus with diabetic nephropathy, without long-term current use of insulin (HCC)  E11.21 250.40  DIABETES FOOT EXAM     583.81    3. Essential hypertension  I10 401.9    4. Major depressive disorder with single episode, remission status unspecified  F32.9 296.20    Assessment:  Type 2 diabetes and hypertension well controlled  Depression with symptoms adequately controlled on current treatment followed by psychiatry    Plan:  Continue current medications  Avoid dietary salt, starch and sugar and follow program of regular aerobic exercise is much as possible  Return for follow-up lab appointment followed by office appointment in 6 months or sooner with any problems      Date of Service:  2021   Patient Name:  Lisset Fox   Patient :  1940     This is a Subsequent Medicare Annual Wellness Visit providing Personalized Prevention Plan Services (PPPS) (Performed 12 months after initial AWV and PPPS )     I have reviewed the patient's medical history in detail and updated the computerized patient record.      History     Past Medical History:   Diagnosis Date    Delusion ([facility] [address]     Depression     Diabetes ([facility] [address]     type 2    GERD (gastroesophageal reflux disease)     Hypertension       Past Surgical History:   Procedure Laterality Date    HX BACK SURGERY      lower back surgery, done last year    HX  SECTION      HX COLONOSCOPY  10/24/2019    Scattered diverticuli only, five-year follow-up recommended because of history of polyps    HX THORACIC LAMINECTOMY      HX TONSILLECTOMY       Current Outpatient Medications   Medication Sig Dispense Refill    nadoloL (CORGARD) 40 mg tablet take 1 tablet by mouth once daily 90 Tablet 4    valACYclovir (VALTREX) 500 mg tablet Take 1 Tab by mouth two (2) times a day. 180 Tab 3    felodipine (PLENDIL SR) 5 mg 24 hr tablet Take 1 Tab by mouth daily. 90 Tab 3    pravastatin (PRAVACHOL) 40 mg tablet Take 1 Tab by mouth nightly. 90 Tab 3    losartan (COZAAR) 50 mg tablet take 1 tablet by mouth once daily 90 Tab 3    Blood-Glucose Meter (FreeStyle Lite Meter) monitoring kit Use for daily glucose checks E 11.21 1 Kit 0    lancets (FreeStyle Lancets) 28 gauge misc Use for daily glucose checks E 11.21 100 Lancet 4    glucose blood VI test strips (FreeStyle Lite Strips) strip Use for daily glucose checks E 11.21 100 Strip 4    aspirin delayed-release 81 mg tablet Take  by mouth daily.  OLANZapine (ZYPREXA) 5 mg tablet Take  by mouth two (2) times a day.  deutetrabenazine (AUSTEDO) 12 mg tab Take 16 mg by mouth two (2) times a day. Allergies   Allergen Reactions    Gabapentin Other (comments)     Confusion      Resperal-Dm [Brompheniramine-Pseudoeph-Dm] Other (comments)     Difficulty standing    Zocor [Simvastatin] Other (comments)     Muscle pain     Family History   Problem Relation Age of Onset    Hypertension Father     Cancer Neg Hx     Diabetes Neg Hx      Social History     Tobacco Use    Smoking status: Former Smoker    Smokeless tobacco: Former User    Tobacco comment: quit 34 years ago   Substance Use Topics    Alcohol use: No     Patient Active Problem List   Diagnosis Code    Type 2 diabetes mellitus with diabetic nephropathy, without long-term current use of insulin ([facility]) E11.21    Essential hypertension I10    Major depressive disorder with single episode F32.9    Gastroesophageal reflux disease without esophagitis K21.9    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, 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.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    Osteoarthritis of lumbar spine M47.816       Living situation:   -- Lives with family  -- Stairs in home yes    Diet, Lifestyle:  Former smoker    Exercise level: moderately active    Depression Risk Factor Screening: 3 most recent PHQ Screens 5/12/2021   Little interest or pleasure in doing things Several days   Feeling down, depressed, irritable, or hopeless Not at all   Total Score PHQ 2 1     Alcohol Risk Factor Screening:   Do you average more than 1 drink per night or more than 7 drinks a week:  No    On any one occasion in the past three months have you have had more than 3 drinks containing alcohol:  No    Functional Ability and Level of Safety:     Hearing Loss   Hearing is good. Activities of Daily Living   Self-care. Requires assistance with: no ADLs    Fall Risk     Fall Risk Assessment, last 12 mths 11/17/2021   Able to walk? Yes   Fall in past 12 months? 0   Do you feel unsteady? 0   Are you worried about falling 0     Abuse Screen   Patient is not abused    Review of Systems   Review of systems reported in the separate problem-oriented documentation. Physical Examination   Physical exam reported in the separate problem-oriented documentation. Evaluation of Cognitive Function:  Mood/affect:  neutral  Appearance: age appropriate  Family member/caregiver input: No    Patient Care Team:  Beck Magaña MD as PCP - General (Family Medicine)  Beck Magaña MD as PCP - [facility] Empaneled Provider  Mila Johnson MD as Consulting Provider (Psychiatry)  Sugar Bauer MD as Consulting Provider (Anesthesiology)    Advice/Counseling   Education and counseling provided:  Advance directives counseling/discussion      Assessment/Plan       ICD-10-CM ICD-9-CM    1. Medicare annual wellness visit, subsequent  Z00.00 V70.0    2. Type 2 diabetes mellitus with diabetic nephropathy, without long-term current use of insulin (HCC)  E11.21 250.40  DIABETES FOOT EXAM     583.81    3. Essential hypertension  I10 401.9    4.  Major depressive disorder with single episode, remission status unspecified  F32.9 296.20      Health maintenance:      5-year personalized preventative services plan:  Complete and return advance directives form  COVID-19 booster completed  Influenza immunization completed   Diabetic eye exam current  Medicare wellness evaluation due November 2022      Ashly Montgomery was provided a written 5-year personalized preventive services plan, which was included in her AVS.    Mónica Ferraro MD      PLEASE NOTE:   This document has been produced using voice recognition software. Unrecognized errors in transcription may be present.

## 2021-11-17 ENCOUNTER — OFFICE VISIT (OUTPATIENT)
Dept: FAMILY MEDICINE CLINIC | Age: 81
End: 2021-11-17
Payer: MEDICARE

## 2021-11-17 VITALS
HEART RATE: 72 BPM | OXYGEN SATURATION: 98 % | RESPIRATION RATE: 16 BRPM | DIASTOLIC BLOOD PRESSURE: 70 MMHG | TEMPERATURE: 98.4 F | WEIGHT: 149 LBS | HEIGHT: 61 IN | BODY MASS INDEX: 28.13 KG/M2 | SYSTOLIC BLOOD PRESSURE: 110 MMHG

## 2021-11-17 DIAGNOSIS — Z00.00 MEDICARE ANNUAL WELLNESS VISIT, SUBSEQUENT: Primary | ICD-10-CM

## 2021-11-17 DIAGNOSIS — F32.9 MAJOR DEPRESSIVE DISORDER WITH SINGLE EPISODE, REMISSION STATUS UNSPECIFIED: ICD-10-CM

## 2021-11-17 DIAGNOSIS — E11.21 TYPE 2 DIABETES MELLITUS WITH DIABETIC NEPHROPATHY, WITHOUT LONG-TERM CURRENT USE OF INSULIN (HCC): ICD-10-CM

## 2021-11-17 DIAGNOSIS — I10 ESSENTIAL HYPERTENSION: ICD-10-CM

## 2021-11-17 PROCEDURE — G8754 DIAS BP LESS 90: HCPCS | Performed by: FAMILY MEDICINE

## 2021-11-17 PROCEDURE — 1101F PT FALLS ASSESS-DOCD LE1/YR: CPT | Performed by: FAMILY MEDICINE

## 2021-11-17 PROCEDURE — G8419 CALC BMI OUT NRM PARAM NOF/U: HCPCS | Performed by: FAMILY MEDICINE

## 2021-11-17 PROCEDURE — 99213 OFFICE O/P EST LOW 20 MIN: CPT | Performed by: FAMILY MEDICINE

## 2021-11-17 PROCEDURE — G8399 PT W/DXA RESULTS DOCUMENT: HCPCS | Performed by: FAMILY MEDICINE

## 2021-11-17 PROCEDURE — G8752 SYS BP LESS 140: HCPCS | Performed by: FAMILY MEDICINE

## 2021-11-17 PROCEDURE — G8536 NO DOC ELDER MAL SCRN: HCPCS | Performed by: FAMILY MEDICINE

## 2021-11-17 PROCEDURE — 1090F PRES/ABSN URINE INCON ASSESS: CPT | Performed by: FAMILY MEDICINE

## 2021-11-17 PROCEDURE — G0439 PPPS, SUBSEQ VISIT: HCPCS | Performed by: FAMILY MEDICINE

## 2021-11-17 PROCEDURE — G8427 DOCREV CUR MEDS BY ELIG CLIN: HCPCS | Performed by: FAMILY MEDICINE

## 2021-11-17 PROCEDURE — G9717 DOC PT DX DEP/BP F/U NT REQ: HCPCS | Performed by: FAMILY MEDICINE

## 2021-11-17 NOTE — PATIENT INSTRUCTIONS
5-year personalized preventative services plan:  Complete and return advance directives form  COVID-19 booster completed  Influenza immunization completed   Diabetic eye exam current  Medicare wellness evaluation due November 2022    Continue current medications  Avoid dietary salt, starch and sugar and follow program of regular aerobic exercise is much as possible  Return for follow-up lab appointment followed by office appointment in 6 months or sooner with any problems

## 2021-11-17 NOTE — PROGRESS NOTES
Lisset Fox is a [de-identified] y.o. female (: 1940) presenting to address:    Chief Complaint   Patient presents with    Annual Wellness Visit       Vitals:    21 1259   BP: 110/70   Pulse: 72   Resp: 16   Temp: 98.4 °F (36.9 °C)   TempSrc: Temporal   SpO2: 98%   Weight: 149 lb (67.6 kg)   Height: 5' 1\" (1.549 m)   PainSc:   0 - No pain       Hearing/Vision:      Hearing Screening    125Hz 250Hz 500Hz 1000Hz 2000Hz 3000Hz 4000Hz 6000Hz 8000Hz   Right ear:            Left ear:               Visual Acuity Screening    Right eye Left eye Both eyes   Without correction: 20/30 20/25 20/20   With correction:          Learning Assessment:     Learning Assessment 2020   PRIMARY LEARNER Patient   HIGHEST LEVEL OF EDUCATION - PRIMARY LEARNER  4 YEARS OF COLLEGE   BARRIERS PRIMARY LEARNER NONE   CO-LEARNER CAREGIVER No   PRIMARY LANGUAGE ENGLISH    NEED No   LEARNER PREFERENCE PRIMARY LISTENING   ANSWERED BY patient   RELATIONSHIP SELF     Depression Screening:     3 most recent PHQ Screens 2021   Little interest or pleasure in doing things Not at all   Feeling down, depressed, irritable, or hopeless Not at all   Total Score PHQ 2 0   Trouble falling or staying asleep, or sleeping too much Nearly every day   Feeling tired or having little energy Nearly every day   Poor appetite, weight loss, or overeating Not at all   Feeling bad about yourself - or that you are a failure or have let yourself or your family down Not at all   Trouble concentrating on things such as school, work, reading, or watching TV Not at all   Moving or speaking so slowly that other people could have noticed; or the opposite being so fidgety that others notice Not at all   Thoughts of being better off dead, or hurting yourself in some way Not at all   PHQ 9 Score 6   How difficult have these problems made it for you to do your work, take care of your home and get along with others Not difficult at all     Fall Risk Assessment:     Fall Risk Assessment, last 12 mths 11/17/2021   Able to walk? Yes   Fall in past 12 months? 0   Do you feel unsteady? 0   Are you worried about falling 0     Abuse Screening:     Abuse Screening Questionnaire 11/17/2021   Do you ever feel afraid of your partner? N   Are you in a relationship with someone who physically or mentally threatens you? N   Is it safe for you to go home? Y     ADL Assessment:     ADL Assessment 11/17/2021   Feeding yourself No Help Needed   Getting from bed to chair No Help Needed   Getting dressed No Help Needed   Bathing or showering No Help Needed   Walk across the room (includes cane/walker) No Help Needed   Using the telphone No Help Needed   Taking your medications No Help Needed   Preparing meals No Help Needed   Managing money (expenses/bills) No Help Needed   Moderately strenuous housework (laundry) No Help Needed   Shopping for personal items (toiletries/medicines) No Help Needed   Shopping for groceries No Help Needed   Driving No Help Needed   Climbing a flight of stairs No Help Needed   Getting to places beyond walking distances No Help Needed        Coordination of Care Questionaire:   1. \"Have you been to the ER, urgent care clinic since your last visit? Hospitalized since your last visit? \" No    2. \"Have you seen or consulted any other health care providers outside of the [address] [place] [address] Luis Manuel since your last visit? \" yes eye dr about a month ago          Advanced Directive:   1. Do you have an Advanced Directive? NO    2. Would you like information on Advanced Directives?  NO

## 2021-11-17 NOTE — ACP (ADVANCE CARE PLANNING)
Advance Directives discussed. The patient verbalizes understanding of the concept and is provided with an Advanced Directives form to complete at home and return for entry in his medical record.   Primary and secondary decision-makers identified

## 2021-11-19 ENCOUNTER — TELEPHONE (OUTPATIENT)
Dept: FAMILY MEDICINE CLINIC | Age: 81
End: 2021-11-19

## 2021-11-19 NOTE — TELEPHONE ENCOUNTER
Patient called in regards to getting A1C results and to see if it was checked in her last visit . Abdulkadir Prince advised and informed the patient of her levels at that it was Abnormal for the patient .

## 2022-03-07 DIAGNOSIS — E11.21 TYPE 2 DIABETES MELLITUS WITH DIABETIC NEPHROPATHY, WITHOUT LONG-TERM CURRENT USE OF INSULIN (HCC): Primary | ICD-10-CM

## 2022-03-07 DIAGNOSIS — I10 ESSENTIAL HYPERTENSION: ICD-10-CM

## 2022-03-17 ENCOUNTER — OFFICE VISIT (OUTPATIENT)
Dept: FAMILY MEDICINE CLINIC | Age: 82
End: 2022-03-17
Payer: MEDICARE

## 2022-03-17 VITALS
OXYGEN SATURATION: 99 % | WEIGHT: 150 LBS | HEIGHT: 61 IN | HEART RATE: 66 BPM | RESPIRATION RATE: 16 BRPM | TEMPERATURE: 97.5 F | DIASTOLIC BLOOD PRESSURE: 80 MMHG | SYSTOLIC BLOOD PRESSURE: 110 MMHG | BODY MASS INDEX: 28.32 KG/M2

## 2022-03-17 DIAGNOSIS — F51.02 ADJUSTMENT INSOMNIA: Primary | ICD-10-CM

## 2022-03-17 DIAGNOSIS — E11.21 TYPE 2 DIABETES MELLITUS WITH DIABETIC NEPHROPATHY, WITHOUT LONG-TERM CURRENT USE OF INSULIN (HCC): ICD-10-CM

## 2022-03-17 DIAGNOSIS — I10 ESSENTIAL HYPERTENSION: ICD-10-CM

## 2022-03-17 DIAGNOSIS — F32.9 MAJOR DEPRESSIVE DISORDER WITH SINGLE EPISODE, REMISSION STATUS UNSPECIFIED: ICD-10-CM

## 2022-03-17 PROCEDURE — 1090F PRES/ABSN URINE INCON ASSESS: CPT | Performed by: FAMILY MEDICINE

## 2022-03-17 PROCEDURE — G8427 DOCREV CUR MEDS BY ELIG CLIN: HCPCS | Performed by: FAMILY MEDICINE

## 2022-03-17 PROCEDURE — G8536 NO DOC ELDER MAL SCRN: HCPCS | Performed by: FAMILY MEDICINE

## 2022-03-17 PROCEDURE — G9717 DOC PT DX DEP/BP F/U NT REQ: HCPCS | Performed by: FAMILY MEDICINE

## 2022-03-17 PROCEDURE — 1101F PT FALLS ASSESS-DOCD LE1/YR: CPT | Performed by: FAMILY MEDICINE

## 2022-03-17 PROCEDURE — G8419 CALC BMI OUT NRM PARAM NOF/U: HCPCS | Performed by: FAMILY MEDICINE

## 2022-03-17 PROCEDURE — G8752 SYS BP LESS 140: HCPCS | Performed by: FAMILY MEDICINE

## 2022-03-17 PROCEDURE — 99213 OFFICE O/P EST LOW 20 MIN: CPT | Performed by: FAMILY MEDICINE

## 2022-03-17 PROCEDURE — G8754 DIAS BP LESS 90: HCPCS | Performed by: FAMILY MEDICINE

## 2022-03-17 PROCEDURE — G8399 PT W/DXA RESULTS DOCUMENT: HCPCS | Performed by: FAMILY MEDICINE

## 2022-03-17 RX ORDER — TEMAZEPAM 7.5 MG/1
7.5 CAPSULE ORAL
Qty: 30 CAPSULE | Refills: 0 | Status: SHIPPED | OUTPATIENT
Start: 2022-03-17 | End: 2022-05-13 | Stop reason: SDUPTHER

## 2022-03-17 NOTE — PROGRESS NOTES
HISTORY OF PRESENT ILLNESS  Onel Fournier is a [de-identified] y.o. female. She presents with concerns about difficulty sleeping with a history of major depression followed by psychiatry, type 2 diabetes and hypertension. Today she reports that her  passed away on 3/4/2022 and since that time she has had a great deal of difficulty sleeping. She has tried taking melatonin without any benefit. She is followed by psychiatry but indicates that she is not confident that she can get a hold of the provider for assistance with this issue very quickly.     Mr#: [id number]      Past Medical History:   Diagnosis Date    Delusion ([facility]     Depression     Diabetes ([facility]     type 2    GERD (gastroesophageal reflux disease)     Hypertension        Past Surgical History:   Procedure Laterality Date    HX BACK SURGERY      lower back surgery, done last year    HX  SECTION      HX COLONOSCOPY  10/24/2019    Scattered diverticuli only, five-year follow-up recommended because of history of polyps    HX THORACIC LAMINECTOMY      HX TONSILLECTOMY         Family History   Problem Relation Age of Onset    Hypertension Father     Cancer Neg Hx     Diabetes Neg Hx        Allergies   Allergen Reactions    Gabapentin Other (comments)     Confusion      Resperal-Dm [Brompheniramine-Pseudoeph-Dm] Other (comments)     Difficulty standing    Zocor [Simvastatin] Other (comments)     Muscle pain       Social History     Tobacco Use   Smoking Status Former Smoker   Smokeless Tobacco Former User   Tobacco Comment    quit 34 years ago       Social History     Substance and Sexual Activity   Alcohol Use No            Patient Active Problem List   Diagnosis Code    Type 2 diabetes mellitus with diabetic nephropathy, without long-term current use of insulin ([facility]) E11.21    Essential hypertension I10    Major depressive disorder with single episode F32.9    Gastroesophageal reflux disease without esophagitis K21.9    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 , Disp: 90 Tab, Rfl: 3    pravastatin (PRAVACHOL) 40 mg tablet, Take 1 Tab by mouth nightly., Disp: 90 Tab, Rfl: 3    losartan (COZAAR) 50 mg tablet, take 1 tablet by mouth once daily, Disp: 90 Tab, Rfl: 3    Blood-Glucose Meter (FreeStyle Lite Meter) monitoring kit, Use for daily glucose checks E 11.21, Disp: 1 Kit, Rfl: 0    lancets (FreeStyle Lancets) 28 gauge misc, Use for daily glucose checks E 11.21, Disp: 100 Lancet, Rfl: 4    glucose blood VI test strips (FreeStyle Lite Strips) strip, Use for daily glucose checks E 11.21, Disp: 100 Strip, Rfl: 4    OLANZapine (ZYPREXA) 5 mg tablet, Take  by mouth two (2) times a day., Disp: , Rfl:     deutetrabenazine (AUSTEDO) 12 mg tab, Take 16 mg by mouth two (2) times a day., Disp: , Rfl:         Review of Systems   Constitutional: Negative for fever and weight loss. Cardiovascular: Negative for chest pain and palpitations. Psychiatric/Behavioral: Positive for depression. Negative for suicidal ideas. The patient has insomnia. Visit Vitals  /80   Pulse 66   Temp 97.5 °F (36.4 °C) (Temporal)   Resp 16   Ht 5' 1\" (1.549 m)   Wt 150 lb (68 kg)   SpO2 99%   BMI 28.34 kg/m²       Physical Exam  Vitals and nursing note reviewed. Constitutional:       General: She is not in acute distress. Appearance: Normal appearance. She is well-developed. She is not ill-appearing. HENT:      Head: Normocephalic. Eyes:      Extraocular Movements: Extraocular movements intact. Cardiovascular:      Rate and Rhythm: Normal rate.    Pulmonary:      Effort: Pulmonary effort is normal.   Musculoskeletal:      Cervical back: Neck supple. Neurological:      Mental Status: She is alert and oriented to person, place, and time. Psychiatric:         Mood and Affect: Mood normal.         Behavior: Behavior normal.         ASSESSMENT and PLAN    ICD-10-CM ICD-9-CM    1. Adjustment insomnia  F51.02 307.41 temazepam (RESTORIL) 7.5 mg capsule   2. Major depressive disorder with single episode, remission status unspecified  F32.9 296.20    3. Type 2 diabetes mellitus with diabetic nephropathy, without long-term current use of insulin (HCC)  E11.21 250.40      583.81    4. Essential hypertension  I10 401.9    Assessment:  Exacerbation of depression symptoms now with associated insomnia following the death of her   Hypertension remains well controlled  Type 2 diabetes has been consistently well controlled    Health maintenance recommendations:  COVID-19 immunization booster    Plan:  Begin temazepam 7.5 mg daily at bedtime if needed for insomnia, do not take with other sedating medications  Contact psychiatry provider to discuss this issue and see if she would recommend an alternative treatment  Return for previously scheduled lab appointment on 5/10/2022 and office evaluation on 5/17/2020. Elisabeth Francois MD      PLEASE NOTE:   This document has been produced using voice recognition software. Unrecognized errors in transcription may be present.

## 2022-03-17 NOTE — PROGRESS NOTES
Kiki Pizarro is a [de-identified] y.o. female (: 1940) presenting to address:    Chief Complaint   Patient presents with    Depression    Sleep Problem     tried melatonin not helping        Vitals:    22 1108   BP: 110/80   Pulse: 66   Resp: 16   Temp: 97.5 °F (36.4 °C)   TempSrc: Temporal   SpO2: 99%   Weight: 150 lb (68 kg)   Height: 5' 1\" (1.549 m)   PainSc:   0 - No pain       Hearing/Vision:   No exam data present    Learning Assessment:     Learning Assessment 2020   PRIMARY LEARNER Patient   HIGHEST LEVEL OF EDUCATION - PRIMARY LEARNER  4 YEARS OF COLLEGE   BARRIERS PRIMARY LEARNER NONE   CO-LEARNER CAREGIVER No   PRIMARY LANGUAGE ENGLISH    NEED No   LEARNER PREFERENCE PRIMARY LISTENING   ANSWERED BY patient   RELATIONSHIP SELF     Depression Screening:     3 most recent PHQ Screens 2021   Little interest or pleasure in doing things Not at all   Feeling down, depressed, irritable, or hopeless Not at all   Total Score PHQ 2 0   Trouble falling or staying asleep, or sleeping too much Nearly every day   Feeling tired or having little energy Nearly every day   Poor appetite, weight loss, or overeating Not at all   Feeling bad about yourself - or that you are a failure or have let yourself or your family down Not at all   Trouble concentrating on things such as school, work, reading, or watching TV Not at all   Moving or speaking so slowly that other people could have noticed; or the opposite being so fidgety that others notice Not at all   Thoughts of being better off dead, or hurting yourself in some way Not at all   PHQ 9 Score 6   How difficult have these problems made it for you to do your work, take care of your home and get along with others Not difficult at all     Fall Risk Assessment:     Fall Risk Assessment, last 12 mths 2021   Able to walk? Yes   Fall in past 12 months? 0   Do you feel unsteady?  0   Are you worried about falling 0     Abuse Screening:     Abuse Screening Questionnaire 11/17/2021   Do you ever feel afraid of your partner? N   Are you in a relationship with someone who physically or mentally threatens you? N   Is it safe for you to go home? Y     ADL Assessment:     ADL Assessment 11/17/2021   Feeding yourself No Help Needed   Getting from bed to chair No Help Needed   Getting dressed No Help Needed   Bathing or showering No Help Needed   Walk across the room (includes cane/walker) No Help Needed   Using the telphone No Help Needed   Taking your medications No Help Needed   Preparing meals No Help Needed   Managing money (expenses/bills) No Help Needed   Moderately strenuous housework (laundry) No Help Needed   Shopping for personal items (toiletries/medicines) No Help Needed   Shopping for groceries No Help Needed   Driving No Help Needed   Climbing a flight of stairs No Help Needed   Getting to places beyond walking distances No Help Needed        Coordination of Care Questionaire:   1. \"Have you been to the ER, urgent care clinic since your last visit? Hospitalized since your last visit? \" No    2. \"Have you seen or consulted any other health care providers outside of the [address] [place] [address] since your last visit? \" No     3. For patients aged 39-70: Has the patient had a colonoscopy? NA - based on age     If the patient is female:    4. For patients aged 41-77: Has the patient had a mammogram within the past 2 years? NA - based on age    11. For patients aged 21-65: Has the patient had a pap smear? NA - based on age    Advanced Directive:   1. Do you have an Advanced Directive? NO    2. Would you like information on Advanced Directives?  NO

## 2022-03-17 NOTE — PATIENT INSTRUCTIONS
Begin temazepam 7.5 mg daily at bedtime if needed for insomnia, do not take with other sedating medications  Contact psychiatry provider to discuss this issue and see if she would recommend an alternative treatment  Return for previously scheduled lab appointment on 5/10/2022 and office evaluation on 5/17/2020.

## 2022-03-18 PROBLEM — F32.9 MAJOR DEPRESSIVE DISORDER WITH SINGLE EPISODE: Status: ACTIVE | Noted: 2018-07-09

## 2022-03-19 PROBLEM — G24.01 NEUROLEPTIC-INDUCED TARDIVE DYSKINESIA: Status: ACTIVE | Noted: 2018-07-09

## 2022-03-19 PROBLEM — I10 ESSENTIAL HYPERTENSION: Status: ACTIVE | Noted: 2018-07-09

## 2022-03-19 PROBLEM — T43.505A NEUROLEPTIC-INDUCED TARDIVE DYSKINESIA: Status: ACTIVE | Noted: 2018-07-09

## 2022-03-19 PROBLEM — E11.21 TYPE 2 DIABETES MELLITUS WITH DIABETIC NEPHROPATHY, WITHOUT LONG-TERM CURRENT USE OF INSULIN (HCC): Status: ACTIVE | Noted: 2018-07-09

## 2022-03-19 PROBLEM — M47.816 OSTEOARTHRITIS OF LUMBAR SPINE: Status: ACTIVE | Noted: 2018-07-09

## 2022-03-20 PROBLEM — K21.9 GASTROESOPHAGEAL REFLUX DISEASE WITHOUT ESOPHAGITIS: Status: ACTIVE | Noted: 2018-07-09

## 2022-04-30 DIAGNOSIS — I10 ESSENTIAL HYPERTENSION: ICD-10-CM

## 2022-04-30 DIAGNOSIS — E11.21 TYPE 2 DIABETES MELLITUS WITH DIABETIC NEPHROPATHY, WITHOUT LONG-TERM CURRENT USE OF INSULIN (HCC): ICD-10-CM

## 2022-04-30 RX ORDER — FELODIPINE 5 MG/1
TABLET, EXTENDED RELEASE ORAL
Qty: 90 TABLET | Refills: 3 | Status: SHIPPED | OUTPATIENT
Start: 2022-04-30

## 2022-04-30 RX ORDER — LOSARTAN POTASSIUM 50 MG/1
TABLET ORAL
Qty: 90 TABLET | Refills: 3 | Status: SHIPPED | OUTPATIENT
Start: 2022-04-30

## 2022-04-30 RX ORDER — PRAVASTATIN SODIUM 40 MG/1
TABLET ORAL
Qty: 90 TABLET | Refills: 3 | Status: SHIPPED | OUTPATIENT
Start: 2022-04-30

## 2022-05-10 ENCOUNTER — APPOINTMENT (OUTPATIENT)
Dept: FAMILY MEDICINE CLINIC | Age: 82
End: 2022-05-10

## 2022-05-10 ENCOUNTER — HOSPITAL ENCOUNTER (OUTPATIENT)
Dept: LAB | Age: 82
Discharge: HOME | End: 2022-05-10
Payer: MEDICARE

## 2022-05-10 DIAGNOSIS — E11.21 TYPE 2 DIABETES MELLITUS WITH DIABETIC NEPHROPATHY, WITHOUT LONG-TERM CURRENT USE OF INSULIN (HCC): ICD-10-CM

## 2022-05-10 DIAGNOSIS — I10 ESSENTIAL HYPERTENSION: ICD-10-CM

## 2022-05-10 LAB
ANION GAP SERPL CALC-SCNC: 7 MMOL/L (ref 3–18)
BUN SERPL-MCNC: 12 MG/DL (ref 7–18)
BUN/CREAT SERPL: 13 (ref 12–20)
CALCIUM SERPL-MCNC: 9.7 MG/DL (ref 8.5–10.1)
CHLORIDE SERPL-SCNC: 104 MMOL/L (ref 100–111)
CO2 SERPL-SCNC: 29 MMOL/L (ref 21–32)
CREAT SERPL-MCNC: 0.91 MG/DL (ref 0.6–1.3)
CREAT UR-MCNC: 19 MG/DL (ref 30–125)
EST. AVERAGE GLUCOSE BLD GHB EST-MCNC: 137 MG/DL
GLUCOSE SERPL-MCNC: 107 MG/DL (ref 74–99)
HBA1C MFR BLD: 6.4 % (ref 4.2–5.6)
MICROALBUMIN UR-MCNC: 1.77 MG/DL (ref 0–3)
MICROALBUMIN/CREAT UR-RTO: 93 MG/G (ref 0–30)
POTASSIUM SERPL-SCNC: 3.9 MMOL/L (ref 3.5–5.5)
SODIUM SERPL-SCNC: 140 MMOL/L (ref 136–145)

## 2022-05-10 PROCEDURE — 82043 UR ALBUMIN QUANTITATIVE: CPT

## 2022-05-10 PROCEDURE — 83036 HEMOGLOBIN GLYCOSYLATED A1C: CPT

## 2022-05-10 PROCEDURE — 80048 BASIC METABOLIC PNL TOTAL CA: CPT

## 2022-05-10 PROCEDURE — 36415 COLL VENOUS BLD VENIPUNCTURE: CPT

## 2022-05-13 ENCOUNTER — OFFICE VISIT (OUTPATIENT)
Dept: FAMILY MEDICINE CLINIC | Age: 82
End: 2022-05-13
Payer: MEDICARE

## 2022-05-13 VITALS
HEART RATE: 73 BPM | BODY MASS INDEX: 28.7 KG/M2 | SYSTOLIC BLOOD PRESSURE: 110 MMHG | OXYGEN SATURATION: 98 % | RESPIRATION RATE: 16 BRPM | DIASTOLIC BLOOD PRESSURE: 80 MMHG | HEIGHT: 61 IN | TEMPERATURE: 97.7 F | WEIGHT: 152 LBS

## 2022-05-13 DIAGNOSIS — F51.02 ADJUSTMENT INSOMNIA: ICD-10-CM

## 2022-05-13 DIAGNOSIS — F32.9 MAJOR DEPRESSIVE DISORDER WITH SINGLE EPISODE, REMISSION STATUS UNSPECIFIED: ICD-10-CM

## 2022-05-13 DIAGNOSIS — E11.21 TYPE 2 DIABETES MELLITUS WITH DIABETIC NEPHROPATHY, WITHOUT LONG-TERM CURRENT USE OF INSULIN (HCC): Primary | ICD-10-CM

## 2022-05-13 DIAGNOSIS — I10 ESSENTIAL HYPERTENSION: ICD-10-CM

## 2022-05-13 PROCEDURE — 99213 OFFICE O/P EST LOW 20 MIN: CPT | Performed by: FAMILY MEDICINE

## 2022-05-13 PROCEDURE — G8419 CALC BMI OUT NRM PARAM NOF/U: HCPCS | Performed by: FAMILY MEDICINE

## 2022-05-13 PROCEDURE — 3044F HG A1C LEVEL LT 7.0%: CPT | Performed by: FAMILY MEDICINE

## 2022-05-13 PROCEDURE — 1101F PT FALLS ASSESS-DOCD LE1/YR: CPT | Performed by: FAMILY MEDICINE

## 2022-05-13 PROCEDURE — G8752 SYS BP LESS 140: HCPCS | Performed by: FAMILY MEDICINE

## 2022-05-13 PROCEDURE — G8754 DIAS BP LESS 90: HCPCS | Performed by: FAMILY MEDICINE

## 2022-05-13 PROCEDURE — G8536 NO DOC ELDER MAL SCRN: HCPCS | Performed by: FAMILY MEDICINE

## 2022-05-13 PROCEDURE — G9717 DOC PT DX DEP/BP F/U NT REQ: HCPCS | Performed by: FAMILY MEDICINE

## 2022-05-13 PROCEDURE — 1090F PRES/ABSN URINE INCON ASSESS: CPT | Performed by: FAMILY MEDICINE

## 2022-05-13 PROCEDURE — G8427 DOCREV CUR MEDS BY ELIG CLIN: HCPCS | Performed by: FAMILY MEDICINE

## 2022-05-13 PROCEDURE — G8399 PT W/DXA RESULTS DOCUMENT: HCPCS | Performed by: FAMILY MEDICINE

## 2022-05-13 RX ORDER — TEMAZEPAM 7.5 MG/1
7.5 CAPSULE ORAL
Qty: 90 CAPSULE | Refills: 1 | Status: SHIPPED | OUTPATIENT
Start: 2022-05-13 | End: 2022-05-17

## 2022-05-13 NOTE — PROGRESS NOTES
HISTORY OF PRESENT ILLNESS  Taurus Cordova is a [de-identified] y.o. female. She presents for follow-up with a history of type 2 diabetes with diabetic nephropathy, hypertension and depression followed by psychiatry. She was seen 2 months ago reporting insomnia following the death of her , concerned that she would not be able to get a hold of her psychiatry consultant for treatment in a reasonable amount of time and was given temazepam 7.5 mg nightly with the understanding that she would discuss this with her psychiatrist for recommendations regarding future treatment.     Mr#: [id number]      Past Medical History:   Diagnosis Date    Delusion ([facility] [facility]     Depression     Diabetes ([facility]     type 2    GERD (gastroesophageal reflux disease)     Hypertension        Past Surgical History:   Procedure Laterality Date    HX BACK SURGERY      lower back surgery, done last year    HX  SECTION      HX COLONOSCOPY  10/24/2019    Scattered diverticuli only, five-year follow-up recommended because of history of polyps    HX THORACIC LAMINECTOMY      HX TONSILLECTOMY         Family History   Problem Relation Age of Onset    Hypertension Father     Cancer Neg Hx     Diabetes Neg Hx        Allergies   Allergen Reactions    Gabapentin Other (comments)     Confusion      Resperal-Dm [Brompheniramine-Pseudoeph-Dm] Other (comments)     Difficulty standing    Zocor [Simvastatin] Other (comments)     Muscle pain       Social History     Tobacco Use   Smoking Status Former Smoker   Smokeless Tobacco Former User   Tobacco Comment    quit 34 years ago       Social History     Substance and Sexual Activity   Alcohol Use No            Patient Active Problem List   Diagnosis Code    Type 2 diabetes mellitus with diabetic nephropathy, without long-term current use of insulin ([facility]) E11.21    Essential hypertension I10    Major depressive disorder with single episode F32.9    Gastroesophageal reflux disease without esophagitis K21.9    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 Max Daily Amount: 7.5 mg., Disp: 30 Capsule, Rfl: 0    nadoloL (CORGARD) 40 mg tablet, take 1 tablet by mouth once daily, Disp: 90 Tablet, Rfl: 4    valACYclovir (VALTREX) 500 mg tablet, Take 1 Tab by mouth two (2) times a day., Disp: 180 Tab, Rfl: 3    lancets (FreeStyle Lancets) 28 gauge misc, Use for daily glucose checks E 11.21, Disp: 100 Lancet, Rfl: 4    glucose blood VI test strips (FreeStyle Lite Strips) strip, Use for daily glucose checks E 11.21, Disp: 100 Strip, Rfl: 4    OLANZapine (ZYPREXA) 5 mg tablet, Take 5 mg by mouth two (2) times a day. 1 tab in the morning and two tabs at night, Disp: , Rfl:     deutetrabenazine (AUSTEDO) 12 mg tab, Take 16 mg by mouth two (2) times a day., Disp: , Rfl:     Blood-Glucose Meter (FreeStyle Lite Meter) monitoring kit, Use for daily glucose checks E 11.21, Disp: 1 Kit, Rfl: 0          Review of Systems   Constitutional: Negative for fever and weight loss. Eyes: Negative for blurred vision. Cardiovascular: Negative for chest pain, palpitations and leg swelling. Genitourinary: Negative for frequency. Neurological: Negative for tingling. Endo/Heme/Allergies: Negative for polydipsia. Psychiatric/Behavioral: Positive for depression. Negative for suicidal ideas. The patient has insomnia.       Visit Vitals  /80   Pulse 73   Temp 97.7 °F (36.5 °C) (Temporal)   Resp 16   Ht 5' 1\" (1.549 m)   Wt 152 lb (68.9 kg)   SpO2 98%   BMI 28.72 kg/m²       Physical Exam  Vitals and nursing note reviewed. Constitutional:       General: She is not in acute distress. Appearance: Normal appearance. She is well-developed. She is not ill-appearing. HENT:      Head: Normocephalic. Eyes:      Extraocular Movements: Extraocular movements intact. Conjunctiva/sclera: Conjunctivae normal.   Cardiovascular:      Rate and Rhythm: Normal rate and regular rhythm. Heart sounds: Normal heart sounds. Pulmonary:      Effort: Pulmonary effort is normal.      Breath sounds: Normal breath sounds. Musculoskeletal:      Cervical back: Neck supple. Skin:     General: Skin is warm and dry. Neurological:      Mental Status: She is alert and oriented to person, place, and time. Psychiatric:         Mood and Affect: Mood normal.         Behavior: Behavior normal.         Thought Content: Thought content normal.             ASSESSMENT and PLAN    ICD-10-CM ICD-9-CM    1. Type 2 diabetes mellitus with diabetic nephropathy, without long-term current use of insulin (HCC)  E11.21 250.40      583.81    2. Essential hypertension  I10 401.9    3. Major depressive disorder with single episode, remission status unspecified  F32.9 296.20    4. Adjustment insomnia  F51.02 307.41 temazepam (RESTORIL) 7.5 mg capsule   Assessment:  Satisfactory control type 2 diabetes with some progression of diabetic nephropathy  Hypertension well controlled  Depression symptoms reported as responding to current treatment and followed by psychiatry  Insomnia with some improvement, has been taking Restoril only every other day    Health maintenance recommendations:    Plan:  Continue current medications and try taking Restoril nightly  Continue to avoid starch and sugar and is much as possible follow program of regular aerobic exercise  Monitor blood pressure carefully and report persistent elevation.  Avoid salt and nephrotoxic substances such as NSAIDs. Continue psychiatry follow-up  Return for lab appointment followed by Medicare wellness evaluation appointment in November or sooner with any problems    Michelle Wood MD      PLEASE NOTE:   This document has been produced using voice recognition software. Unrecognized errors in transcription may be present.

## 2022-05-13 NOTE — PATIENT INSTRUCTIONS
Assessment:  Satisfactory control type 2 diabetes with some progression of diabetic nephropathy  Hypertension well controlled  Depression symptoms reported as responding to current treatment and followed by psychiatry  Insomnia with some improvement, has been taking Restoril only every other day    Health maintenance recommendations:    Plan:  Continue current medications and try taking Restoril nightly  Continue to avoid starch and sugar and is much as possible follow program of regular aerobic exercise  Monitor blood pressure carefully and report persistent elevation. Avoid salt and nephrotoxic substances such as NSAIDs.   Continue psychiatry follow-up  Return for lab appointment followed by Medicare wellness evaluation appointment in November or sooner with any problems

## 2022-05-13 NOTE — PROGRESS NOTES
Yue Dorman is a [de-identified] y.o. female (: 1940) presenting to address:    Chief Complaint   Patient presents with    Sleep Problem     still having some trouble sleeping        Vitals:    22 1324   BP: 110/80   Pulse: 73   Resp: 16   Temp: 97.7 °F (36.5 °C)   TempSrc: Temporal   SpO2: 98%   Weight: 152 lb (68.9 kg)   Height: 5' 1\" (1.549 m)   PainSc:   0 - No pain       Hearing/Vision:   No exam data present    Learning Assessment:     Learning Assessment 2020   PRIMARY LEARNER Patient   HIGHEST LEVEL OF EDUCATION - PRIMARY LEARNER  4 YEARS OF COLLEGE   BARRIERS PRIMARY LEARNER NONE   CO-LEARNER CAREGIVER No   PRIMARY LANGUAGE ENGLISH    NEED No   LEARNER PREFERENCE PRIMARY LISTENING   ANSWERED BY patient   RELATIONSHIP SELF     Depression Screening:     3 most recent PHQ Screens 2022   Little interest or pleasure in doing things Not at all   Feeling down, depressed, irritable, or hopeless Several days   Total Score PHQ 2 1   Trouble falling or staying asleep, or sleeping too much Several days   Feeling tired or having little energy Not at all   Poor appetite, weight loss, or overeating Not at all   Feeling bad about yourself - or that you are a failure or have let yourself or your family down Several days   Trouble concentrating on things such as school, work, reading, or watching TV Not at all   Moving or speaking so slowly that other people could have noticed; or the opposite being so fidgety that others notice Not at all   Thoughts of being better off dead, or hurting yourself in some way Not at all   PHQ 9 Score 3   How difficult have these problems made it for you to do your work, take care of your home and get along with others Not difficult at all     Fall Risk Assessment:     Fall Risk Assessment, last 12 mths 2021   Able to walk? Yes   Fall in past 12 months? 0   Do you feel unsteady?  0   Are you worried about falling 0     Abuse Screening:     Abuse Screening Questionnaire 11/17/2021   Do you ever feel afraid of your partner? N   Are you in a relationship with someone who physically or mentally threatens you? N   Is it safe for you to go home? Y     ADL Assessment:     ADL Assessment 11/17/2021   Feeding yourself No Help Needed   Getting from bed to chair No Help Needed   Getting dressed No Help Needed   Bathing or showering No Help Needed   Walk across the room (includes cane/walker) No Help Needed   Using the telphone No Help Needed   Taking your medications No Help Needed   Preparing meals No Help Needed   Managing money (expenses/bills) No Help Needed   Moderately strenuous housework (laundry) No Help Needed   Shopping for personal items (toiletries/medicines) No Help Needed   Shopping for groceries No Help Needed   Driving No Help Needed   Climbing a flight of stairs No Help Needed   Getting to places beyond walking distances No Help Needed        Coordination of Care Questionaire:   1. \"Have you been to the ER, urgent care clinic since your last visit? Hospitalized since your last visit? \" No    2. \"Have you seen or consulted any other health care providers outside of the [address] [place] [address] since your last visit? \" No     3. For patients aged 39-70: Has the patient had a colonoscopy? NA - based on age     If the patient is female:    4. For patients aged 41-77: Has the patient had a mammogram within the past 2 years? NA - based on age    11. For patients aged 21-65: Has the patient had a pap smear? NA - based on age    Advanced Directive:   1. Do you have an Advanced Directive? NO    2. Would you like information on Advanced Directives?  NO

## 2022-05-16 DIAGNOSIS — F51.02 ADJUSTMENT INSOMNIA: ICD-10-CM

## 2022-05-17 RX ORDER — TEMAZEPAM 7.5 MG/1
CAPSULE ORAL
Qty: 30 CAPSULE | Refills: 5 | Status: SHIPPED | OUTPATIENT
Start: 2022-05-17

## 2022-06-15 DIAGNOSIS — B00.9 HERPES SIMPLEX: ICD-10-CM

## 2022-06-16 RX ORDER — VALACYCLOVIR HYDROCHLORIDE 500 MG/1
TABLET, FILM COATED ORAL
Qty: 180 TABLET | Refills: 3 | Status: SHIPPED | OUTPATIENT
Start: 2022-06-16

## 2022-06-22 ENCOUNTER — OFFICE VISIT (OUTPATIENT)
Dept: FAMILY MEDICINE CLINIC | Age: 82
End: 2022-06-22
Payer: MEDICARE

## 2022-06-22 VITALS
TEMPERATURE: 98.2 F | DIASTOLIC BLOOD PRESSURE: 70 MMHG | HEIGHT: 61 IN | BODY MASS INDEX: 28.51 KG/M2 | OXYGEN SATURATION: 98 % | RESPIRATION RATE: 16 BRPM | WEIGHT: 151 LBS | SYSTOLIC BLOOD PRESSURE: 106 MMHG | HEART RATE: 67 BPM

## 2022-06-22 DIAGNOSIS — E11.21 TYPE 2 DIABETES MELLITUS WITH DIABETIC NEPHROPATHY, WITHOUT LONG-TERM CURRENT USE OF INSULIN (HCC): ICD-10-CM

## 2022-06-22 DIAGNOSIS — E11.21 TYPE 2 DIABETES MELLITUS WITH DIABETIC NEPHROPATHY, WITHOUT LONG-TERM CURRENT USE OF INSULIN (HCC): Primary | ICD-10-CM

## 2022-06-22 DIAGNOSIS — F32.9 MAJOR DEPRESSIVE DISORDER WITH SINGLE EPISODE, REMISSION STATUS UNSPECIFIED: ICD-10-CM

## 2022-06-22 DIAGNOSIS — I10 ESSENTIAL HYPERTENSION: ICD-10-CM

## 2022-06-22 DIAGNOSIS — R41.3 MEMORY PROBLEM: Primary | ICD-10-CM

## 2022-06-22 PROCEDURE — 3044F HG A1C LEVEL LT 7.0%: CPT | Performed by: FAMILY MEDICINE

## 2022-06-22 PROCEDURE — G8417 CALC BMI ABV UP PARAM F/U: HCPCS | Performed by: FAMILY MEDICINE

## 2022-06-22 PROCEDURE — 1123F ACP DISCUSS/DSCN MKR DOCD: CPT | Performed by: FAMILY MEDICINE

## 2022-06-22 PROCEDURE — G8536 NO DOC ELDER MAL SCRN: HCPCS | Performed by: FAMILY MEDICINE

## 2022-06-22 PROCEDURE — G8427 DOCREV CUR MEDS BY ELIG CLIN: HCPCS | Performed by: FAMILY MEDICINE

## 2022-06-22 PROCEDURE — 1090F PRES/ABSN URINE INCON ASSESS: CPT | Performed by: FAMILY MEDICINE

## 2022-06-22 PROCEDURE — 99213 OFFICE O/P EST LOW 20 MIN: CPT | Performed by: FAMILY MEDICINE

## 2022-06-22 PROCEDURE — 1101F PT FALLS ASSESS-DOCD LE1/YR: CPT | Performed by: FAMILY MEDICINE

## 2022-06-22 PROCEDURE — G8399 PT W/DXA RESULTS DOCUMENT: HCPCS | Performed by: FAMILY MEDICINE

## 2022-06-22 PROCEDURE — G9717 DOC PT DX DEP/BP F/U NT REQ: HCPCS | Performed by: FAMILY MEDICINE

## 2022-06-22 PROCEDURE — G8752 SYS BP LESS 140: HCPCS | Performed by: FAMILY MEDICINE

## 2022-06-22 PROCEDURE — G8754 DIAS BP LESS 90: HCPCS | Performed by: FAMILY MEDICINE

## 2022-06-22 NOTE — PROGRESS NOTES
HISTORY OF PRESENT ILLNESS  Anika Berumen is a [de-identified] y.o. female. Ms. Jamey Guardado presents reporting concern about difficulty with memory and requesting referral for neurology evaluation. She reports being especially concerned because her mother had \"early onset\" memory loss. She is followed for additional problems including major depression managed by psychiatry and type 2 diabetes which is well controlled.     Mr#: [id number]      Past Medical History:   Diagnosis Date    Delusion ([address]     Depression     Diabetes ([facility] [address]     type 2    GERD (gastroesophageal reflux disease)     Hypertension        Past Surgical History:   Procedure Laterality Date    HX BACK SURGERY      lower back surgery, done last year    HX  SECTION      HX COLONOSCOPY  10/24/2019    Scattered diverticuli only, five-year follow-up recommended because of history of polyps    HX THORACIC LAMINECTOMY      HX TONSILLECTOMY         Family History   Problem Relation Age of Onset    Hypertension Father     Cancer Neg Hx     Diabetes Neg Hx        Allergies   Allergen Reactions    Gabapentin Other (comments)     Confusion      Resperal-Dm [Brompheniramine-Pseudoeph-Dm] Other (comments)     Difficulty standing    Zocor [Simvastatin] Other (comments)     Muscle pain       Social History     Tobacco Use   Smoking Status Former Smoker   Smokeless Tobacco Former User   Tobacco Comment    quit 34 years ago       Social History     Substance and Sexual Activity   Alcohol Use No            Patient Active Problem List   Diagnosis Code    Type 2 diabetes mellitus with diabetic nephropathy, without long-term current use of insulin ([facility]) E11.21    Essential hypertension I10    Major depressive disorder with single episode F32.9    Gastroesophageal reflux disease without esophagitis K21.9    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, 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.505A    Osteoarthritis of lumbar spine M47.816         Current Outpatient Medications:    valACYclovir (VALTREX) 500 mg tablet, take 1 tablet by mouth twice a day, Disp: 180 Tablet, Rfl: 3    temazepam (RESTORIL) 7.5 mg capsule, take 1 capsule by mouth once daily at bedtime if needed for sleep, Disp: 30 Capsule, Rfl: 5    losartan (COZAAR) 50 mg tablet, take 1 tablet by mouth once daily, Disp: 90 Tablet, Rfl: 3    pravastatin (PRAVACHOL) 40 mg tablet, take 1 tablet by mouth nightly, Disp: 90 Tablet, Rfl: 3    felodipine (PLENDIL SR) 5 mg 24 hr tablet, take 1 tablet by mouth once daily, Disp: 90 Tablet, Rfl: 3    nadoloL (CORGARD) 40 mg tablet, take 1 tablet by mouth once daily, Disp: 90 Tablet, Rfl: 4    Blood-Glucose Meter (FreeStyle Lite Meter) monitoring kit, Use for daily glucose checks E 11.21, Disp: 1 Kit, Rfl: 0    lancets (FreeStyle Lancets) 28 gauge misc, Use for daily glucose checks E 11.21, Disp: 100 Lancet, Rfl: 4    glucose blood VI test strips (FreeStyle Lite Strips) strip, Use for daily glucose checks E 11.21, Disp: 100 Strip, Rfl: 4    OLANZapine (ZYPREXA) 5 mg tablet, Take 5 mg by mouth two (2) times a day. 1 tab in the morning and two tabs at night, Disp: , Rfl:     deutetrabenazine (AUSTEDO) 12 mg tab, Take 16 mg by mouth two (2) times a day., Disp: , Rfl:         Review of Systems   Constitutional: Negative for fever and weight loss. Cardiovascular: Negative for chest pain and palpitations. Neurological: Negative for dizziness, speech change, focal weakness and headaches. Psychiatric/Behavioral: Positive for depression ( Has felt more depressed since the death of her ) and memory loss. Negative for suicidal ideas. Visit Vitals  /70   Pulse 67   Temp 98.2 °F (36.8 °C) (Temporal)   Resp 16   Ht 5' 1\" (1.549 m)   Wt 151 lb (68.5 kg)   SpO2 98%   BMI 28.53 kg/m²       Physical Exam  Vitals and nursing note reviewed. Constitutional:       Appearance: She is well-developed. HENT:      Head: Normocephalic.    Eyes:      Extraocular Movements: Extraocular movements intact. Conjunctiva/sclera: Conjunctivae normal.   Cardiovascular:      Rate and Rhythm: Normal rate and regular rhythm. Heart sounds: Normal heart sounds. Pulmonary:      Effort: Pulmonary effort is normal.      Breath sounds: Normal breath sounds. Musculoskeletal:      Cervical back: Neck supple. Skin:     General: Skin is warm and dry. Neurological:      Mental Status: She is alert and oriented to person, place, and time. Comments: Patient is oriented x3 with a normal fund of information, she denies any difficulty with getting lost or making change. Her biggest concern is that she writes things down and sometimes does not remember them. On evaluation today she had no difficulty handling several questions from the Mini-Mental status exam.   Psychiatric:         Mood and Affect: Mood normal.         Behavior: Behavior normal.         Thought Content: Thought content normal.         ASSESSMENT and PLAN    ICD-10-CM ICD-9-CM    1. Memory problem  R41.3 780.93    2. Major depressive disorder with single episode, remission status unspecified  F32.9 296.20    3. Type 2 diabetes mellitus with diabetic nephropathy, without long-term current use of insulin (formerly [facility])  E11.21 250.40      583.81    Assessment:  Concerned about memory  Depression followed by psychiatry  Type 2 diabetes with diabetic nephropathy well-controlled with excellent hemoglobin A1c level last month    Plan:  Reassured, advised that her age of [de-identified]years old removes the possibility of a diagnosis of early onset dementia  Schedule lab appointment prior to already scheduled Medicare wellness evaluation appointment on 11/14/2022  Return sooner with any problems    Timo Garcai MD      PLEASE NOTE:   This document has been produced using voice recognition software. Unrecognized errors in transcription may be present.

## 2022-06-22 NOTE — PATIENT INSTRUCTIONS
Assessment:  Concerned about memory  Depression followed by psychiatry  Type 2 diabetes with diabetic nephropathy well-controlled with excellent hemoglobin A1c level last month    Plan:  Reassured  Schedule lab appointment prior to already scheduled Medicare wellness evaluation appointment on 11/14/2022  Return sooner with any problems

## 2022-06-22 NOTE — PROGRESS NOTES
Polo Glover is a [de-identified] y.o. female (: 1940) presenting to address:    Chief Complaint   Patient presents with    Memory Loss     for the last month or more . Vitals:    22 1432   BP: 106/70   Pulse: 67   Resp: 16   Temp: 98.2 °F (36.8 °C)   TempSrc: Temporal   SpO2: 98%   Weight: 151 lb (68.5 kg)   Height: 5' 1\" (1.549 m)   PainSc:   0 - No pain       Hearing/Vision:   No exam data present    Learning Assessment:     Learning Assessment 2020   PRIMARY LEARNER Patient   HIGHEST LEVEL OF EDUCATION - PRIMARY LEARNER  4 YEARS OF COLLEGE   BARRIERS PRIMARY LEARNER NONE   CO-LEARNER CAREGIVER No   PRIMARY LANGUAGE ENGLISH    NEED No   LEARNER PREFERENCE PRIMARY LISTENING   ANSWERED BY patient   RELATIONSHIP SELF     Depression Screening:     3 most recent PHQ Screens 2022   Little interest or pleasure in doing things Not at all   Feeling down, depressed, irritable, or hopeless More than half the days   Total Score PHQ 2 2   Trouble falling or staying asleep, or sleeping too much Not at all   Feeling tired or having little energy Several days   Poor appetite, weight loss, or overeating Not at all   Feeling bad about yourself - or that you are a failure or have let yourself or your family down Not at all   Trouble concentrating on things such as school, work, reading, or watching TV Several days   Moving or speaking so slowly that other people could have noticed; or the opposite being so fidgety that others notice Not at all   Thoughts of being better off dead, or hurting yourself in some way Not at all   PHQ 9 Score 4   How difficult have these problems made it for you to do your work, take care of your home and get along with others Not difficult at all     Fall Risk Assessment:     Fall Risk Assessment, last 12 mths 2021   Able to walk? Yes   Fall in past 12 months? 0   Do you feel unsteady?  0   Are you worried about falling 0     Abuse Screening:     Abuse Screening Questionnaire 11/17/2021   Do you ever feel afraid of your partner? N   Are you in a relationship with someone who physically or mentally threatens you? N   Is it safe for you to go home? Y     ADL Assessment:     ADL Assessment 11/17/2021   Feeding yourself No Help Needed   Getting from bed to chair No Help Needed   Getting dressed No Help Needed   Bathing or showering No Help Needed   Walk across the room (includes cane/walker) No Help Needed   Using the telphone No Help Needed   Taking your medications No Help Needed   Preparing meals No Help Needed   Managing money (expenses/bills) No Help Needed   Moderately strenuous housework (laundry) No Help Needed   Shopping for personal items (toiletries/medicines) No Help Needed   Shopping for groceries No Help Needed   Driving No Help Needed   Climbing a flight of stairs No Help Needed   Getting to places beyond walking distances No Help Needed        Coordination of Care Questionaire:   1. \"Have you been to the ER, urgent care clinic since your last visit? Hospitalized since your last visit? \" No    2. \"Have you seen or consulted any other health care providers outside of the [address] [place] [address] since your last visit? \" No     3. For patients aged 39-70: Has the patient had a colonoscopy? NA - based on age     If the patient is female:    4. For patients aged 41-77: Has the patient had a mammogram within the past 2 years? NA - based on age    11. For patients aged 21-65: Has the patient had a pap smear? NA - based on age    Advanced Directive:   1. Do you have an Advanced Directive? NO    2. Would you like information on Advanced Directives?  NO

## 2022-07-13 ENCOUNTER — TELEPHONE (OUTPATIENT)
Dept: FAMILY MEDICINE CLINIC | Age: 82
End: 2022-07-13

## 2022-07-13 DIAGNOSIS — E11.21 TYPE 2 DIABETES MELLITUS WITH DIABETIC NEPHROPATHY, WITHOUT LONG-TERM CURRENT USE OF INSULIN (HCC): ICD-10-CM

## 2022-07-13 RX ORDER — LANCETS 28 GAUGE
EACH MISCELLANEOUS
Qty: 100 LANCET | Refills: 0 | Status: SHIPPED | OUTPATIENT
Start: 2022-07-13

## 2022-07-13 RX ORDER — BLOOD-GLUCOSE METER
KIT MISCELLANEOUS
Qty: 100 STRIP | Refills: 0 | Status: SHIPPED | OUTPATIENT
Start: 2022-07-13

## 2022-07-13 NOTE — TELEPHONE ENCOUNTER
Patient called to request a medication refill. She states that she is running very low.  Please advise  Requested Prescriptions     Pending Prescriptions Disp Refills    lancets (FreeStyle Lancets) 28 gauge misc 100 Lancet 4     Sig: Use for daily glucose checks E 11.21    glucose blood VI test strips (FreeStyle Lite Strips) strip 100 Strip 4     Sig: Use for daily glucose checks E 11.21     Future Appointments   Date Time Provider Mateus Villanuevai   11/14/2022  9:30 AM Prabhakar Hernández MD [facility]     Last seen 06/22/22

## 2022-07-15 NOTE — TELEPHONE ENCOUNTER
Pt called in regards to her lancets and test strips that was sent over by Dr Zulma Navas on 7/13/22. Pt stated that the pharmacy informed her that they were waiting on a response from the Dr office. Called the pharmacy to check on the prescription. Informed by pharmacist that they need a DWO for both lancets and strips in order to refill.  Please advise

## 2022-07-19 ENCOUNTER — TELEPHONE (OUTPATIENT)
Dept: FAMILY MEDICINE CLINIC | Age: 82
End: 2022-07-19

## 2022-07-19 NOTE — TELEPHONE ENCOUNTER
Patient has not heard from breast center she will call the number on letter she received to scheduled additional views .

## 2022-07-19 NOTE — TELEPHONE ENCOUNTER
I spoke with pharmacy and they have lancets and test strips ready for  . I called and notified patient .

## 2022-07-19 NOTE — TELEPHONE ENCOUNTER
Pt stated that she received results back from her mammo saying there was a problem with her breast and in the letter she was informed to repeat her mammogram. Please advise

## 2022-08-31 DIAGNOSIS — I10 ESSENTIAL HYPERTENSION: ICD-10-CM

## 2022-08-31 RX ORDER — NADOLOL 40 MG/1
TABLET ORAL
Qty: 90 TABLET | Refills: 4 | Status: SHIPPED | OUTPATIENT
Start: 2022-08-31

## 2022-11-12 NOTE — PROGRESS NOTES
HISTORY OF PRESENT ILLNESS  Maritza Brody is a 81 y.o. female. She presents for follow-up with a history of type 2 diabetes with diabetic nephropathy, hypertension and depression followed by psychiatry as well as for Medicare wellness evaluation.      Mr#: [id number]      Past Medical History:   Diagnosis Date    Delusion ([facility] [address]     Depression     Diabetes ([facility] [address]     type 2    GERD (gastroesophageal reflux disease)     Hypertension        Past Surgical History:   Procedure Laterality Date    HX BACK SURGERY      lower back surgery, done last year    HX  SECTION      HX COLONOSCOPY  10/24/2019    Scattered diverticuli only, five-year follow-up recommended because of history of polyps    HX THORACIC LAMINECTOMY      HX TONSILLECTOMY         Family History   Problem Relation Age of Onset    Hypertension Father     Cancer Neg Hx     Diabetes Neg Hx        Allergies   Allergen Reactions    Gabapentin Other (comments)     Confusion      Resperal-Dm [Brompheniramine-Pseudoeph-Dm] Other (comments)     Difficulty standing    Zocor [Simvastatin] Other (comments)     Muscle pain       Social History     Tobacco Use   Smoking Status Former   Smokeless Tobacco Former   Tobacco Comments    quit 34 years ago       Social History     Substance and Sexual Activity   Alcohol Use No            Patient Active Problem List   Diagnosis Code    Type 2 diabetes mellitus with diabetic nephropathy, without long-term current use of insulin (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    Osteoarthritis of lumbar spine M47.816         Current Outpatient Medications:     nadoloL (CORGARD) 40 mg tablet, take 1 tablet by mouth once daily, Disp: 90 Tablet, Rfl: 4    lancets (FreeStyle Lancets) 28 gauge misc, Use for daily glucose checks E 11.21, Disp: 100 Lancet, Rfl: 0    glucose blood VI test strips (FreeStyle Lite Strips) strip, Use for daily glucose checks E 11.21, Disp: 100 Strip, Rfl: 0    valACYclovir (VALTREX) 500 mg tablet, take 1 tablet by mouth twice a day, Disp: 180 Tablet, Rfl: 3    temazepam (RESTORIL) 7.5 mg capsule, take 1 capsule by mouth once daily at bedtime if needed for sleep, Disp: 30 Capsule, Rfl: 5    losartan (COZAAR) 50 mg tablet, take 1 tablet by mouth once daily, Disp: 90 Tablet, Rfl: 3    pravastatin (PRAVACHOL) 40 mg tablet, take 1 tablet by mouth nightly, Disp: 90 Tablet, Rfl: 3    felodipine (PLENDIL SR) 5 mg 24 hr tablet, take 1 tablet by mouth once daily, Disp: 90 Tablet, Rfl: 3    Blood-Glucose Meter (FreeStyle Lite Meter) monitoring kit, Use for daily glucose checks E 11.21, Disp: 1 Kit, Rfl: 0    OLANZapine (ZYPREXA) 5 mg tablet, Take 5 mg by mouth two (2) times a day. 1 tab in the morning and two tabs at night, Disp: , Rfl:     deutetrabenazine 12 mg tab, Take 16 mg by mouth two (2) times a day., Disp: , Rfl:         Review of Systems   Constitutional:  Negative for chills, fever and weight loss. HENT:  Negative for congestion, ear pain, hearing loss and sore throat. Eyes:  Negative for blurred vision and double vision. Respiratory:  Negative for cough, shortness of breath and wheezing. Cardiovascular:  Negative for chest pain, palpitations and leg swelling. Gastrointestinal:  Negative for abdominal pain, blood in stool, constipation, diarrhea, heartburn, melena, nausea and vomiting. Genitourinary:  Negative for dysuria and urgency. Musculoskeletal:  Negative for joint pain and myalgias. Skin:  Negative for itching and rash. Neurological:  Negative for dizziness, tingling, tremors, sensory change, focal weakness and headaches. Endo/Heme/Allergies:  Negative for environmental allergies. Psychiatric/Behavioral:  Positive for depression. Negative for suicidal ideas. The patient has insomnia.  The patient is not nervous/anxious. Visit Vitals  /64 (BP 1 Location: Left upper arm, BP Patient Position: Sitting, BP Cuff Size: Adult)   Pulse 71   Temp 98.1 °F (36.7 °C) (Temporal)   Resp 16   Ht 5' 1\" (1.549 m)   Wt 156 lb 9.6 oz (71 kg)   SpO2 98%   BMI 29.59 kg/m²       Physical Exam  Vitals and nursing note reviewed. Constitutional:       General: She is not in acute distress. Appearance: Normal appearance. She is not ill-appearing. HENT:      Head: Normocephalic. Right Ear: Tympanic membrane, ear canal and external ear normal.      Left Ear: Tympanic membrane, ear canal and external ear normal.      Mouth/Throat:      Mouth: Mucous membranes are moist.      Pharynx: Oropharynx is clear. Eyes:      Extraocular Movements: Extraocular movements intact. Conjunctiva/sclera: Conjunctivae normal.      Pupils: Pupils are equal, round, and reactive to light. Neck:      Vascular: No carotid bruit. Cardiovascular:      Rate and Rhythm: Normal rate and regular rhythm. Heart sounds: Normal heart sounds. Pulmonary:      Effort: Pulmonary effort is normal.      Breath sounds: Normal breath sounds. Abdominal:      Palpations: Abdomen is soft. Tenderness: There is no abdominal tenderness. Musculoskeletal:         General: No deformity. Cervical back: Neck supple. Right lower leg: No edema. Left lower leg: No edema. Skin:     General: Skin is warm and dry. Neurological:      Mental Status: She is alert and oriented to person, place, and time.    Psychiatric:         Mood and Affect: Mood normal.         Behavior: Behavior normal.        Diabetic foot exam performed by Jesusita Cortes MD     Measurement  Response Nurse Comment Physician Comment   Monofilament  R - normal sensation with micro filament  L - normal sensation with micro filament     Pulse DP R - trace  L - 2+ (normal)     Pulse TP R - 2+ (normal)  L - 2+ (normal)     Structural deformity R - None  L - None     Skin Integrity / Deformity R - None  L - None        Reviewed by:                       ASSESSMENT and PLAN    ICD-10-CM ICD-9-CM    1. Medicare annual wellness visit, subsequent  Z00.00 V70.0       2. Type 2 diabetes mellitus with diabetic nephropathy, without long-term current use of insulin (HCC)  E11.21 250.40 AMB POC HEMOGLOBIN A1C     583.81       3. Essential hypertension  I10 401.9       4. Current episode of major depressive disorder without prior episode, unspecified depression episode severity  F32.9 296.20       5. Adjustment insomnia  F51.02 307.41 temazepam (RESTORIL) 7.5 mg capsule      Assessment:  Type 2 diabetes with diabetic nephropathy well-controlled  Hypertension adequately managed  Symptoms of depression reported as worse because of being alone since her   and being worried about selling her house, not sleeping and requests a refill on her prescription for temazepam-otherwise followed by psychiatry    Plan:  Lab studies ordered, further disposition pending lab results  Continue current medications  Avoid dietary salt, starch and sugar is much as possible follow program of regular aerobic exercise  Return for follow-up in 6 months with in office hemoglobin A1c or sooner with any problems  Please always arrive at least 15 minutes before your scheduled appointment time        Date of Service:  2022   Patient Name:  Arianne Patricio   Patient :  1940     This is a Subsequent Medicare Annual Wellness Visit providing Personalized Prevention Plan Services (PPPS) (Performed 12 months after initial AWV and PPPS )     I have reviewed the patient's medical history in detail and updated the computerized patient record.      History     Past Medical History:   Diagnosis Date    Delusion ([facility] [address]     Depression     Diabetes ([facility] [address]     type 2    GERD (gastroesophageal reflux disease)     Hypertension       Past Surgical History:   Procedure Laterality Date    HX BACK SURGERY      lower back surgery, done last year    HX  SECTION      HX COLONOSCOPY  10/24/2019    Scattered diverticuli only, five-year follow-up recommended because of history of polyps    HX THORACIC LAMINECTOMY      HX TONSILLECTOMY       Current Outpatient Medications   Medication Sig Dispense Refill    nadoloL (CORGARD) 40 mg tablet take 1 tablet by mouth once daily 90 Tablet 4    lancets (FreeStyle Lancets) 28 gauge misc Use for daily glucose checks E 11.21 100 Lancet 0    glucose blood VI test strips (FreeStyle Lite Strips) strip Use for daily glucose checks E 11.21 100 Strip 0    valACYclovir (VALTREX) 500 mg tablet take 1 tablet by mouth twice a day 180 Tablet 3    temazepam (RESTORIL) 7.5 mg capsule take 1 capsule by mouth once daily at bedtime if needed for sleep 30 Capsule 5    losartan (COZAAR) 50 mg tablet take 1 tablet by mouth once daily 90 Tablet 3    pravastatin (PRAVACHOL) 40 mg tablet take 1 tablet by mouth nightly 90 Tablet 3    felodipine (PLENDIL SR) 5 mg 24 hr tablet take 1 tablet by mouth once daily 90 Tablet 3    Blood-Glucose Meter (FreeStyle Lite Meter) monitoring kit Use for daily glucose checks E 11.21 1 Kit 0    OLANZapine (ZYPREXA) 5 mg tablet Take 5 mg by mouth two (2) times a day. 1 tab in the morning and two tabs at night      deutetrabenazine (AUSTEDO) 12 mg tab Take 16 mg by mouth two (2) times a day.        Allergies   Allergen Reactions    Gabapentin Other (comments)     Confusion      Resperal-Dm [Brompheniramine-Pseudoeph-Dm] Other (comments)     Difficulty standing    Zocor [Simvastatin] Other (comments)     Muscle pain     Family History   Problem Relation Age of Onset    Hypertension Father     Cancer Neg Hx     Diabetes Neg Hx      Social History     Tobacco Use    Smoking status: Former    Smokeless tobacco: Former    Tobacco comments:     quit 34 years ago   Substance Use Topics    Alcohol use: No     Patient Active Problem List   Diagnosis Code    Type 2 diabetes mellitus with diabetic nephropathy, without long-term current use of insulin ([facility]) E11.21    Essential hypertension I10    Major depressive disorder with single episode F32.9    Gastroesophageal reflux disease without esophagitis K21.9    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, 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.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    Osteoarthritis of lumbar spine M47.816       Living situation:   -- Lives  alone-- Stairs in home yes    Diet, Lifestyle: nonsmoker    Exercise level: moderately active    Depression Risk Factor Screening:     3 most recent PHQ Screens 6/22/2022   Little interest or pleasure in doing things Not at all   Feeling down, depressed, irritable, or hopeless More than half the days   Total Score PHQ 2 2   Trouble falling or staying asleep, or sleeping too much Not at all   Feeling tired or having little energy Several days   Poor appetite, weight loss, or overeating Not at all   Feeling bad about yourself - or that you are a failure or have let yourself or your family down Not at all   Trouble concentrating on things such as school, work, reading, or watching TV Several days   Moving or speaking so slowly that other people could have noticed; or the opposite being so fidgety that others notice Not at all   Thoughts of being better off dead, or hurting yourself in some way Not at all   PHQ 9 Score 4   How difficult have these problems made it for you to do your work, take care of your home and get along with others Not difficult at all     Alcohol Risk Factor Screening:   Do you average more than 1 drink per night or more than 7 drinks a week:  No    On any one occasion in the past three months have you have had more than 3 drinks containing alcohol:  No    Functional Ability and Level of Safety:     Hearing Loss   Hearing is good. Activities of Daily Living   Self-care. Requires assistance with: no ADLs    Fall Risk     Fall Risk Assessment, last 12 mths 11/17/2021   Able to walk? Yes   Fall in past 12 months? 0   Do you feel unsteady?  0 Are you worried about falling 0     Abuse Screen   Patient is not abused    Review of Systems   Review of systems reported in the separate problem-oriented documentation. Physical Examination   Physical exam reported in the separate problem-oriented documentation. Evaluation of Cognitive Function:  Denies memory problems or other symptoms of cognitive dysfunction  Mood/affect:  sad,worried  Appearance: age appropriate  Family member/caregiver input: None    Patient Care Team:  Jonathan Redding MD as PCP - General (Family Medicine)  Jonathan Redding MD as PCP - [facility] Empaneled Provider  Jose L Kate MD as Consulting Provider (Psychiatry)  Shayla Gee MD as Consulting Provider (Anesthesiology)    Advice/Counseling   Education and counseling provided:  Advanced directives counseling/discussion  Risks associated with narcotic medication and substances reviewed    Assessment/Plan       ICD-10-CM ICD-9-CM    1. Medicare annual wellness visit, subsequent  Z00.00 V70.0       2. Type 2 diabetes mellitus with diabetic nephropathy, without long-term current use of insulin (HCC)  E11.21 250.40 AMB POC HEMOGLOBIN A1C     583.81       3. Essential hypertension  I10 401.9       4. Current episode of major depressive disorder without prior episode, unspecified depression episode severity  F32.9 296.20       5. Adjustment insomnia  F51.02 307.41 temazepam (RESTORIL) 7.5 mg capsule        Health maintenance:      5-year personalized preventative services plan:  Complete and return advance directives form  COVID-19 immunization with bivalent vaccine  Influenza immunization completed  Diabetic eye exam due September 2024  Medicare wellness evaluation due November 2023      Isabella Cosme was provided a written 5-year personalized preventive services plan, which was included in her AVS.    Larry Coates MD      PLEASE NOTE:   This document has been produced using voice recognition software.  Unrecognized errors in transcription may be present.

## 2022-11-14 ENCOUNTER — HOSPITAL ENCOUNTER (OUTPATIENT)
Dept: LAB | Age: 82
Discharge: HOME | End: 2022-11-14
Payer: MEDICARE

## 2022-11-14 ENCOUNTER — OFFICE VISIT (OUTPATIENT)
Dept: FAMILY MEDICINE CLINIC | Age: 82
End: 2022-11-14
Payer: MEDICARE

## 2022-11-14 ENCOUNTER — APPOINTMENT (OUTPATIENT)
Dept: FAMILY MEDICINE CLINIC | Age: 82
End: 2022-11-14

## 2022-11-14 VITALS
TEMPERATURE: 98.1 F | DIASTOLIC BLOOD PRESSURE: 64 MMHG | WEIGHT: 156.6 LBS | BODY MASS INDEX: 29.57 KG/M2 | SYSTOLIC BLOOD PRESSURE: 122 MMHG | RESPIRATION RATE: 16 BRPM | OXYGEN SATURATION: 98 % | HEART RATE: 71 BPM | HEIGHT: 61 IN

## 2022-11-14 DIAGNOSIS — E11.21 TYPE 2 DIABETES MELLITUS WITH DIABETIC NEPHROPATHY, WITHOUT LONG-TERM CURRENT USE OF INSULIN (HCC): ICD-10-CM

## 2022-11-14 DIAGNOSIS — F51.02 ADJUSTMENT INSOMNIA: ICD-10-CM

## 2022-11-14 DIAGNOSIS — I10 ESSENTIAL HYPERTENSION: ICD-10-CM

## 2022-11-14 DIAGNOSIS — F32.9 MAJOR DEPRESSIVE DISORDER WITH SINGLE EPISODE, REMISSION STATUS UNSPECIFIED: ICD-10-CM

## 2022-11-14 DIAGNOSIS — F32.9 CURRENT EPISODE OF MAJOR DEPRESSIVE DISORDER WITHOUT PRIOR EPISODE, UNSPECIFIED DEPRESSION EPISODE SEVERITY: ICD-10-CM

## 2022-11-14 DIAGNOSIS — Z00.00 MEDICARE ANNUAL WELLNESS VISIT, SUBSEQUENT: Primary | ICD-10-CM

## 2022-11-14 LAB
ALBUMIN SERPL-MCNC: 4 G/DL (ref 3.4–5)
ALBUMIN/GLOB SERPL: 1.1 {RATIO} (ref 0.8–1.7)
ALP SERPL-CCNC: 114 U/L (ref 45–117)
ALT SERPL-CCNC: 29 U/L (ref 13–56)
ANION GAP SERPL CALC-SCNC: 6 MMOL/L (ref 3–18)
APPEARANCE UR: CLEAR
AST SERPL-CCNC: 27 U/L (ref 10–38)
BASOPHILS # BLD: 0.1 K/UL (ref 0–0.1)
BASOPHILS NFR BLD: 1 % (ref 0–2)
BILIRUB SERPL-MCNC: 0.4 MG/DL (ref 0.2–1)
BILIRUB UR QL: NEGATIVE
BUN SERPL-MCNC: 11 MG/DL (ref 7–18)
BUN/CREAT SERPL: 13 (ref 12–20)
CALCIUM SERPL-MCNC: 8.9 MG/DL (ref 8.5–10.1)
CHLORIDE SERPL-SCNC: 104 MMOL/L (ref 100–111)
CHOLEST SERPL-MCNC: 222 MG/DL
CO2 SERPL-SCNC: 27 MMOL/L (ref 21–32)
COLOR UR: YELLOW
CREAT SERPL-MCNC: 0.82 MG/DL (ref 0.6–1.3)
CREAT UR-MCNC: <13 MG/DL (ref 30–125)
DIFFERENTIAL METHOD BLD: ABNORMAL
EOSINOPHIL # BLD: 0.1 K/UL (ref 0–0.4)
EOSINOPHIL NFR BLD: 2 % (ref 0–5)
ERYTHROCYTE [DISTWIDTH] IN BLOOD BY AUTOMATED COUNT: 14.6 % (ref 11.6–14.5)
GLOBULIN SER CALC-MCNC: 3.5 G/DL (ref 2–4)
GLUCOSE SERPL-MCNC: 205 MG/DL (ref 74–99)
GLUCOSE UR STRIP.AUTO-MCNC: 100 MG/DL
HBA1C MFR BLD HPLC: 6.3 %
HCT VFR BLD AUTO: 37.2 % (ref 35–45)
HDLC SERPL-MCNC: 134 MG/DL (ref 40–60)
HDLC SERPL: 1.7 {RATIO} (ref 0–5)
HGB BLD-MCNC: 12 G/DL (ref 12–16)
HGB UR QL STRIP: NEGATIVE
IMM GRANULOCYTES # BLD AUTO: 0 K/UL (ref 0–0.04)
IMM GRANULOCYTES NFR BLD AUTO: 0 % (ref 0–0.5)
KETONES UR QL STRIP.AUTO: NEGATIVE MG/DL
LDLC SERPL CALC-MCNC: 68 MG/DL (ref 0–100)
LEUKOCYTE ESTERASE UR QL STRIP.AUTO: NEGATIVE
LIPID PROFILE,FLP: ABNORMAL
LYMPHOCYTES # BLD: 1.7 K/UL (ref 0.9–3.6)
LYMPHOCYTES NFR BLD: 32 % (ref 21–52)
MCH RBC QN AUTO: 29.4 PG (ref 24–34)
MCHC RBC AUTO-ENTMCNC: 32.3 G/DL (ref 31–37)
MCV RBC AUTO: 91.2 FL (ref 78–100)
MICROALBUMIN UR-MCNC: 0.64 MG/DL (ref 0–3)
MICROALBUMIN/CREAT UR-RTO: ABNORMAL MG/G (ref 0–30)
MONOCYTES # BLD: 0.5 K/UL (ref 0.05–1.2)
MONOCYTES NFR BLD: 10 % (ref 3–10)
NEUTS SEG # BLD: 3 K/UL (ref 1.8–8)
NEUTS SEG NFR BLD: 56 % (ref 40–73)
NITRITE UR QL STRIP.AUTO: NEGATIVE
NRBC # BLD: 0 K/UL (ref 0–0.01)
NRBC BLD-RTO: 0 PER 100 WBC
PH UR STRIP: 6 [PH] (ref 5–8)
PLATELET # BLD AUTO: 369 K/UL (ref 135–420)
PMV BLD AUTO: 9.9 FL (ref 9.2–11.8)
POTASSIUM SERPL-SCNC: 4.2 MMOL/L (ref 3.5–5.5)
PROT SERPL-MCNC: 7.5 G/DL (ref 6.4–8.2)
PROT UR STRIP-MCNC: NEGATIVE MG/DL
RBC # BLD AUTO: 4.08 M/UL (ref 4.2–5.3)
SODIUM SERPL-SCNC: 137 MMOL/L (ref 136–145)
SP GR UR REFRACTOMETRY: <1.005 (ref 1–1.03)
TRIGL SERPL-MCNC: 100 MG/DL (ref ?–150)
TSH SERPL DL<=0.05 MIU/L-ACNC: 0.66 UIU/ML (ref 0.36–3.74)
UROBILINOGEN UR QL STRIP.AUTO: 0.2 EU/DL (ref 0.2–1)
VLDLC SERPL CALC-MCNC: 20 MG/DL
WBC # BLD AUTO: 5.5 K/UL (ref 4.6–13.2)

## 2022-11-14 PROCEDURE — G8399 PT W/DXA RESULTS DOCUMENT: HCPCS | Performed by: FAMILY MEDICINE

## 2022-11-14 PROCEDURE — 84443 ASSAY THYROID STIM HORMONE: CPT

## 2022-11-14 PROCEDURE — 3078F DIAST BP <80 MM HG: CPT | Performed by: FAMILY MEDICINE

## 2022-11-14 PROCEDURE — G8417 CALC BMI ABV UP PARAM F/U: HCPCS | Performed by: FAMILY MEDICINE

## 2022-11-14 PROCEDURE — 82043 UR ALBUMIN QUANTITATIVE: CPT

## 2022-11-14 PROCEDURE — G8754 DIAS BP LESS 90: HCPCS | Performed by: FAMILY MEDICINE

## 2022-11-14 PROCEDURE — 85025 COMPLETE CBC W/AUTO DIFF WBC: CPT

## 2022-11-14 PROCEDURE — 81003 URINALYSIS AUTO W/O SCOPE: CPT

## 2022-11-14 PROCEDURE — 80053 COMPREHEN METABOLIC PANEL: CPT

## 2022-11-14 PROCEDURE — G8536 NO DOC ELDER MAL SCRN: HCPCS | Performed by: FAMILY MEDICINE

## 2022-11-14 PROCEDURE — 1123F ACP DISCUSS/DSCN MKR DOCD: CPT | Performed by: FAMILY MEDICINE

## 2022-11-14 PROCEDURE — G8427 DOCREV CUR MEDS BY ELIG CLIN: HCPCS | Performed by: FAMILY MEDICINE

## 2022-11-14 PROCEDURE — 36415 COLL VENOUS BLD VENIPUNCTURE: CPT

## 2022-11-14 PROCEDURE — G8752 SYS BP LESS 140: HCPCS | Performed by: FAMILY MEDICINE

## 2022-11-14 PROCEDURE — G0439 PPPS, SUBSEQ VISIT: HCPCS | Performed by: FAMILY MEDICINE

## 2022-11-14 PROCEDURE — 3044F HG A1C LEVEL LT 7.0%: CPT | Performed by: FAMILY MEDICINE

## 2022-11-14 PROCEDURE — 83036 HEMOGLOBIN GLYCOSYLATED A1C: CPT | Performed by: FAMILY MEDICINE

## 2022-11-14 PROCEDURE — 1101F PT FALLS ASSESS-DOCD LE1/YR: CPT | Performed by: FAMILY MEDICINE

## 2022-11-14 PROCEDURE — 80061 LIPID PANEL: CPT

## 2022-11-14 PROCEDURE — 3074F SYST BP LT 130 MM HG: CPT | Performed by: FAMILY MEDICINE

## 2022-11-14 PROCEDURE — G9717 DOC PT DX DEP/BP F/U NT REQ: HCPCS | Performed by: FAMILY MEDICINE

## 2022-11-14 RX ORDER — TEMAZEPAM 7.5 MG/1
CAPSULE ORAL
Qty: 30 CAPSULE | Refills: 5 | Status: SHIPPED | OUTPATIENT
Start: 2022-11-14

## 2022-11-14 NOTE — PATIENT INSTRUCTIONS
5-year personalized preventative services plan:  Complete and return advance directives form  COVID-19 immunization with bivalent vaccine  Influenza immunization completed  Diabetic eye exam due September 2024  Medicare wellness evaluation due November 2023    Assessment:  Type 2 diabetes with diabetic nephropathy well-controlled  Hypertension adequately managed  Symptoms of depression reported as stable and followed by psychiatry    Plan:  Lab studies ordered, further disposition pending lab results  Continue current medications  Avoid dietary salt, starch and sugar is much as possible follow program of regular aerobic exercise  Return for follow-up in 6 months with in office hemoglobin A1c or sooner with any problems  Please always arrive at least 15 minutes before your scheduled appointment time

## 2022-11-14 NOTE — PROGRESS NOTES
Karis Tinsley is a 81 y.o. female (: 1940) presenting to address:    Chief Complaint   Patient presents with    Annual Wellness Visit       Vitals:    22 0907   BP: 122/64   Pulse: 71   Resp: 16   Temp: 98.1 °F (36.7 °C)   TempSrc: Temporal   SpO2: 98%   Weight: 156 lb 9.6 oz (71 kg)   Height: 5' 1\" (1.549 m)   PainSc:   0 - No pain       Hearing/Vision:     Vision Screening    Right eye Left eye Both eyes   Without correction      With correction 20/40-1 20 20/25       Learning Assessment:     Learning Assessment 2020   PRIMARY LEARNER Patient   HIGHEST LEVEL OF EDUCATION - PRIMARY LEARNER  4 YEARS OF COLLEGE   BARRIERS PRIMARY LEARNER NONE   CO-LEARNER CAREGIVER No   PRIMARY LANGUAGE ENGLISH    NEED No   LEARNER PREFERENCE PRIMARY LISTENING   ANSWERED BY patient   RELATIONSHIP SELF     Depression Screening:     3 most recent PHQ Screens 2022   Little interest or pleasure in doing things Not at all   Feeling down, depressed, irritable, or hopeless Nearly every day   Total Score PHQ 2 3   Trouble falling or staying asleep, or sleeping too much -   Feeling tired or having little energy -   Poor appetite, weight loss, or overeating -   Feeling bad about yourself - or that you are a failure or have let yourself or your family down -   Trouble concentrating on things such as school, work, reading, or watching TV -   Moving or speaking so slowly that other people could have noticed; or the opposite being so fidgety that others notice -   Thoughts of being better off dead, or hurting yourself in some way -   PHQ 9 Score -   How difficult have these problems made it for you to do your work, take care of your home and get along with others -     Fall Risk Assessment:     Fall Risk Assessment, last 12 mths 2022   Able to walk? Yes   Fall in past 12 months? 0   Do you feel unsteady?  0   Are you worried about falling 0     Abuse Screening:     Abuse Screening Questionnaire 11/14/2022   Do you ever feel afraid of your partner? N   Are you in a relationship with someone who physically or mentally threatens you? N   Is it safe for you to go home? Y     ADL Assessment:     ADL Assessment 11/14/2022   Feeding yourself No Help Needed   Getting from bed to chair No Help Needed   Getting dressed No Help Needed   Bathing or showering No Help Needed   Walk across the room (includes cane/walker) No Help Needed   Using the telphone No Help Needed   Taking your medications No Help Needed   Preparing meals No Help Needed   Managing money (expenses/bills) No Help Needed   Moderately strenuous housework (laundry) No Help Needed   Shopping for personal items (toiletries/medicines) No Help Needed   Shopping for groceries No Help Needed   Driving No Help Needed   Climbing a flight of stairs No Help Needed   Getting to places beyond walking distances No Help Needed        Coordination of Care Questionaire:   1. \"Have you been to the ER, urgent care clinic since your last visit? Hospitalized since your last visit? \" No    2. \"Have you seen or consulted any other health care providers outside of the [address] [place] [address] since your last visit? \" Yes Where: Ortho      3. For patients aged 39-70: Has the patient had a colonoscopy? NA - based on age     If the patient is female:    4. For patients aged 41-77: Has the patient had a mammogram within the past 2 years? NA - based on age    11. For patients aged 21-65: Has the patient had a pap smear? NA - based on age    Advanced Directive:   1. Do you have an Advanced Directive? NO    2. Would you like information on Advanced Directives?  NO

## 2022-11-15 NOTE — PROGRESS NOTES
I called home number and got a recording that says to enter the number you are trying to reach . I enter the number and system didn't recognize the number . I called and left message on mobile .

## 2022-11-18 DIAGNOSIS — E11.21 TYPE 2 DIABETES MELLITUS WITH DIABETIC NEPHROPATHY, WITHOUT LONG-TERM CURRENT USE OF INSULIN (HCC): ICD-10-CM

## 2022-11-18 RX ORDER — BLOOD-GLUCOSE METER
KIT MISCELLANEOUS
Qty: 100 STRIP | Refills: 3 | Status: SHIPPED | OUTPATIENT
Start: 2022-11-18

## 2022-11-18 NOTE — TELEPHONE ENCOUNTER
Last refilled 7/13/22 for 100 with 0 refills .  Last OV 11/14/22   Future Appointments   Date Time Provider Mateus Graham   5/15/2023 10:30 AM MD GINA Darden BS AMB

## 2022-11-18 NOTE — TELEPHONE ENCOUNTER
Pt called with Rx Refill Request. Pt is currently out.   Requested Prescriptions     Pending Prescriptions Disp Refills    glucose blood VI test strips (FreeStyle Lite Strips) strip 100 Strip 0     Sig: Use for daily glucose checks E 11.21

## 2022-11-21 DIAGNOSIS — E11.21 TYPE 2 DIABETES MELLITUS WITH DIABETIC NEPHROPATHY, WITHOUT LONG-TERM CURRENT USE OF INSULIN (HCC): Primary | ICD-10-CM

## 2022-11-21 RX ORDER — LANCETS 28 GAUGE
EACH MISCELLANEOUS
Qty: 100 LANCET | Refills: 3 | Status: SHIPPED | OUTPATIENT
Start: 2022-11-21

## 2022-11-22 NOTE — TELEPHONE ENCOUNTER
Pt stated that she called and left a message on 11/18/22 in regards to getting her prescription filles. Pt stated that the pharmacy stated that they need paperwork filled before they can fill the medication. I asked the pt if it was a prior Pat Lee and she stated that she is not sure. Pt is in need of her strips and lancets.  Please advise

## 2022-12-09 DIAGNOSIS — B00.9 HERPES SIMPLEX: ICD-10-CM

## 2022-12-10 RX ORDER — VALACYCLOVIR HYDROCHLORIDE 500 MG/1
TABLET, FILM COATED ORAL
Qty: 180 TABLET | Refills: 3 | Status: SHIPPED | OUTPATIENT
Start: 2022-12-10

## 2023-03-23 ENCOUNTER — OFFICE VISIT (OUTPATIENT)
Dept: FAMILY MEDICINE CLINIC | Facility: CLINIC | Age: 83
End: 2023-03-23
Payer: COMMERCIAL

## 2023-03-23 VITALS
HEIGHT: 61 IN | BODY MASS INDEX: 29.83 KG/M2 | WEIGHT: 158 LBS | HEART RATE: 78 BPM | RESPIRATION RATE: 17 BRPM | DIASTOLIC BLOOD PRESSURE: 60 MMHG | SYSTOLIC BLOOD PRESSURE: 118 MMHG | OXYGEN SATURATION: 98 % | TEMPERATURE: 97.8 F

## 2023-03-23 DIAGNOSIS — F32.9 MAJOR DEPRESSIVE DISORDER WITH SINGLE EPISODE, REMISSION STATUS UNSPECIFIED: ICD-10-CM

## 2023-03-23 DIAGNOSIS — E11.21 TYPE 2 DIABETES MELLITUS WITH DIABETIC NEPHROPATHY, WITHOUT LONG-TERM CURRENT USE OF INSULIN (HCC): Primary | ICD-10-CM

## 2023-03-23 DIAGNOSIS — I10 ESSENTIAL HYPERTENSION: ICD-10-CM

## 2023-03-23 PROCEDURE — 3078F DIAST BP <80 MM HG: CPT | Performed by: FAMILY MEDICINE

## 2023-03-23 PROCEDURE — 1123F ACP DISCUSS/DSCN MKR DOCD: CPT | Performed by: FAMILY MEDICINE

## 2023-03-23 PROCEDURE — 99213 OFFICE O/P EST LOW 20 MIN: CPT | Performed by: FAMILY MEDICINE

## 2023-03-23 PROCEDURE — 3074F SYST BP LT 130 MM HG: CPT | Performed by: FAMILY MEDICINE

## 2023-03-23 SDOH — ECONOMIC STABILITY: HOUSING INSECURITY
IN THE LAST 12 MONTHS, WAS THERE A TIME WHEN YOU DID NOT HAVE A STEADY PLACE TO SLEEP OR SLEPT IN A SHELTER (INCLUDING NOW)?: NO

## 2023-03-23 SDOH — ECONOMIC STABILITY: FOOD INSECURITY: WITHIN THE PAST 12 MONTHS, THE FOOD YOU BOUGHT JUST DIDN'T LAST AND YOU DIDN'T HAVE MONEY TO GET MORE.: NEVER TRUE

## 2023-03-23 SDOH — ECONOMIC STABILITY: INCOME INSECURITY: HOW HARD IS IT FOR YOU TO PAY FOR THE VERY BASICS LIKE FOOD, HOUSING, MEDICAL CARE, AND HEATING?: SOMEWHAT HARD

## 2023-03-23 SDOH — ECONOMIC STABILITY: FOOD INSECURITY: WITHIN THE PAST 12 MONTHS, YOU WORRIED THAT YOUR FOOD WOULD RUN OUT BEFORE YOU GOT MONEY TO BUY MORE.: NEVER TRUE

## 2023-03-23 ASSESSMENT — PATIENT HEALTH QUESTIONNAIRE - PHQ9
5. POOR APPETITE OR OVEREATING: 0
10. IF YOU CHECKED OFF ANY PROBLEMS, HOW DIFFICULT HAVE THESE PROBLEMS MADE IT FOR YOU TO DO YOUR WORK, TAKE CARE OF THINGS AT HOME, OR GET ALONG WITH OTHER PEOPLE: 0
7. TROUBLE CONCENTRATING ON THINGS, SUCH AS READING THE NEWSPAPER OR WATCHING TELEVISION: 0
4. FEELING TIRED OR HAVING LITTLE ENERGY: 1
8. MOVING OR SPEAKING SO SLOWLY THAT OTHER PEOPLE COULD HAVE NOTICED. OR THE OPPOSITE, BEING SO FIGETY OR RESTLESS THAT YOU HAVE BEEN MOVING AROUND A LOT MORE THAN USUAL: 0
6. FEELING BAD ABOUT YOURSELF - OR THAT YOU ARE A FAILURE OR HAVE LET YOURSELF OR YOUR FAMILY DOWN: 0
SUM OF ALL RESPONSES TO PHQ QUESTIONS 1-9: 3
SUM OF ALL RESPONSES TO PHQ QUESTIONS 1-9: 3
SUM OF ALL RESPONSES TO PHQ9 QUESTIONS 1 & 2: 1
2. FEELING DOWN, DEPRESSED OR HOPELESS: 0
1. LITTLE INTEREST OR PLEASURE IN DOING THINGS: 1
SUM OF ALL RESPONSES TO PHQ QUESTIONS 1-9: 3
3. TROUBLE FALLING OR STAYING ASLEEP: 1
SUM OF ALL RESPONSES TO PHQ QUESTIONS 1-9: 3

## 2023-03-23 ASSESSMENT — ENCOUNTER SYMPTOMS
SHORTNESS OF BREATH: 0
CHEST TIGHTNESS: 0

## 2023-03-23 NOTE — PROGRESS NOTES
HISTORY OF PRESENT ILLNESS  Gege Mcgregor  is a 81 y.o. y.o. female    Needs forms completed for admission to an assisted living facility      Mr#: [id number]      Past Medical History:   Diagnosis Date    Delusion ([facility] [address]     Depression     Diabetes ([facility] [address]     type 2    GERD (gastroesophageal reflux disease)     Hypertension        Past Surgical History:   Procedure Laterality Date    BACK SURGERY      lower back surgery, done last year     SECTION      COLONOSCOPY  10/24/2019    Scattered diverticuli only, five-year follow-up recommended because of history of polyps    THORACIC LAMINECTOMY      TONSILLECTOMY         Family History   Problem Relation Age of Onset    Diabetes Neg Hx     Hypertension Father     Cancer Neg Hx        Allergies   Allergen Reactions    Gabapentin Other (See Comments)     Confusion    Simvastatin Other (See Comments)     Muscle pain       Social History     Tobacco Use   Smoking Status Former   Smokeless Tobacco Former   Tobacco Comments    Quit smoking: quit 34 years ago       Social History     Substance and Sexual Activity   Alcohol Use No       Immunization History   Administered Date(s) Administered    COVID-19, J&J, (age 18y+), IM, 0.5 mL 2021    COVID-19, MODERNA BLUE border, Primary or Immunocompromised, (age 12y+), IM, 100 mcg/0.5mL 2022, 2022    COVID-19, MODERNA Bivalent BOOSTER, (age 12y+), IM, 48 mcg/0.5 mL 2023    Hepatitis B 1989    Influenza A ([id number]),all Formulations 2009    Influenza Quadv 10/01/2016, 10/01/2018    Influenza Trivalent 2008, 10/24/2008, 10/05/2013, 11/10/2016, 2018    Influenza Virus Vaccine 2010, 2011, 10/05/2013, 10/30/2018, 2019    Influenza, High Dose (Fluzone 65 yrs and older) 10/29/2018, 2022    Influenza, Triv, inactivated, subunit, adjuvanted, IM (Fluad 65 yrs and older) 10/30/2018, 2019, 2021    Pneumococcal Vaccine 10/01/2016    Pneumococcal, PCV-13,

## 2023-03-23 NOTE — PROGRESS NOTES
Elena Amos is a 81 y.o. female (: 1940) presenting to address:    Chief Complaint   Patient presents with    Forms       Vitals:    23 1459   BP: 118/60   Pulse: 78   Resp: 17   Temp: 97.8 °F (36.6 °C)   SpO2: 98%       Coordination of Care Questionaire:   1. \"Have you been to the ER, urgent care clinic since your last visit? Hospitalized since your last visit? \" No    2. \"Have you seen or consulted any other health care providers outside of the [address] [place] [address] since your last visit? \" No     3. For patients aged 39-70: Has the patient had a colonoscopy / FIT/ Cologuard? NA - based on age      If the patient is female:    4. For patients aged 41-77: Has the patient had a mammogram within the past 2 years? NA - based on age or sex      11. For patients aged 21-65: Has the patient had a pap smear? NA - based on age or sex    Advanced Directive:   1. Do you have an Advanced Directive? No    2. Would you like information on Advanced Directives?  No

## 2023-03-30 ENCOUNTER — TELEPHONE (OUTPATIENT)
Dept: FAMILY MEDICINE CLINIC | Facility: CLINIC | Age: 83
End: 2023-03-30

## 2023-03-30 NOTE — TELEPHONE ENCOUNTER
I spoke with patient she denies any sob or chest pain . The medication she was given by ortho is not new . Symptoms started a few days ago . Advised if she isnt having any sob or chest pain and feels okay other then the swelling she could go to urgent care to eval or if develops any sob or feels bad she should go to the er . She voiced understanding.

## 2023-03-30 NOTE — TELEPHONE ENCOUNTER
Patient calling requesting an urgent appointment but nothing available until April 12th - patient states she is being seen by orthopedic that recently prescribed her a new medication but is unable to see them for concerns of new pitting edema and pain in lower extremities past couple days. Please triage and advise if patient needs to be seen in ED for evaluation. Please call patient at [phone].

## 2023-04-05 ENCOUNTER — OFFICE VISIT (OUTPATIENT)
Dept: FAMILY MEDICINE CLINIC | Facility: CLINIC | Age: 83
End: 2023-04-05
Payer: COMMERCIAL

## 2023-04-05 VITALS
OXYGEN SATURATION: 98 % | WEIGHT: 159 LBS | RESPIRATION RATE: 17 BRPM | HEART RATE: 63 BPM | DIASTOLIC BLOOD PRESSURE: 68 MMHG | HEIGHT: 61 IN | TEMPERATURE: 97.7 F | SYSTOLIC BLOOD PRESSURE: 113 MMHG | BODY MASS INDEX: 30.02 KG/M2

## 2023-04-05 DIAGNOSIS — M25.552 PAIN OF LEFT HIP: Primary | ICD-10-CM

## 2023-04-05 DIAGNOSIS — Z11.1 TUBERCULOSIS SCREENING: Primary | ICD-10-CM

## 2023-04-05 PROCEDURE — 1123F ACP DISCUSS/DSCN MKR DOCD: CPT | Performed by: INTERNAL MEDICINE

## 2023-04-05 PROCEDURE — 3074F SYST BP LT 130 MM HG: CPT | Performed by: INTERNAL MEDICINE

## 2023-04-05 PROCEDURE — 99213 OFFICE O/P EST LOW 20 MIN: CPT | Performed by: INTERNAL MEDICINE

## 2023-04-05 PROCEDURE — 3078F DIAST BP <80 MM HG: CPT | Performed by: INTERNAL MEDICINE

## 2023-04-05 RX ORDER — TIZANIDINE 2 MG/1
2 TABLET ORAL 3 TIMES DAILY PRN
Qty: 30 TABLET | Refills: 0 | Status: SHIPPED | OUTPATIENT
Start: 2023-04-05 | End: 2023-04-15

## 2023-04-05 RX ORDER — DEUTETRABENAZINE 9 MG/1
TABLET, COATED ORAL
COMMUNITY
Start: 2023-03-25

## 2023-04-05 NOTE — PROGRESS NOTES
HISTORY OF PRESENT ILLNESS  Peri Guerra is a 81 y.o. female    HPI  C/o left hip pain, started few months ago, pt was in the ER 3 days ago for her hip pain, the ER records noted today, she was given prednisone 40 mg daily for 3 days and lidocaine patches, stated that they did not help, she stated that she was given steroid injection by orthopedics a month ago, no recent fall, pain is worse with walking    Review of Systems   Neurological:  Negative for dizziness, weakness and numbness. Physical Exam  Vitals reviewed. Musculoskeletal:      Left hip: Tenderness (lateral side over trochanteric bursa.) present. No crepitus. Normal range of motion. Normal strength. ASSESSMENT and PLAN    1. Pain of left hip, most likely trochanteric bursitis,  Pt stated she had a steroid injection in the bursa about a month ago, so it is too soon to repeat, advised pt to follow up with orthopedics  -     External Referral To Orthopedic Surgery  -     tiZANidine (ZANAFLEX) 2 MG tablet; Take 1 tablet by mouth 3 times daily as needed (for muscle spasm), Disp-30 tablet, R-0Normal  - tylenol 1000 mg po q 8 hours as needed for pain. XR - EXAM HIP UNILAT,2-3 VIEWS,LT    Anatomical Region Laterality Modality   Lower Extremity -- Computed Radiography   Hip -- --     Impression      No evidence of acute fracture or dislocation. Prior left femur ORIF with metallic sommer fixation. Mild right hip osteoarthrosis. Signed By: Bing Perkins MD on 1/14/2023 11:27 AM    No follow-ups on file.

## 2023-04-05 NOTE — PROGRESS NOTES
Lana Mcburney is a 81 y.o. female (: 1940) presenting to address:    Chief Complaint   Patient presents with    Hip Pain     Left side       Vitals:    23 1247   BP: 113/68   Pulse: 63   Resp: 17   Temp: 97.7 °F (36.5 °C)   SpO2: 98%       Coordination of Care Questionaire:   1. \"Have you been to the ER, urgent care clinic since your last visit? Hospitalized since your last visit? \" Yes \[facility]\"" x  for back pain. 2. \"Have you seen or consulted any other health care providers outside of the [address] [place] [address] since your last visit? \" No     3. For patients aged 39-70: Has the patient had a colonoscopy / FIT/ Cologuard? NA - based on age      If the patient is female:    4. For patients aged 41-77: Has the patient had a mammogram within the past 2 years? NA - based on age or sex      11. For patients aged 21-65: Has the patient had a pap smear? NA - based on age or sex    Advanced Directive:   1. Do you have an Advanced Directive? Yes    2. Would you like information on Advanced Directives?  No

## 2023-05-01 RX ORDER — LOSARTAN POTASSIUM 50 MG/1
TABLET ORAL
Qty: 90 TABLET | Refills: 3 | Status: SHIPPED | OUTPATIENT
Start: 2023-05-01

## 2023-05-01 RX ORDER — PRAVASTATIN SODIUM 40 MG
TABLET ORAL
Qty: 90 TABLET | Refills: 3 | Status: SHIPPED | OUTPATIENT
Start: 2023-05-01

## 2023-05-05 RX ORDER — PRAVASTATIN SODIUM 40 MG
TABLET ORAL
Qty: 90 TABLET | Refills: 3 | OUTPATIENT
Start: 2023-05-05

## 2023-05-05 RX ORDER — LOSARTAN POTASSIUM 50 MG/1
TABLET ORAL
Qty: 90 TABLET | Refills: 3 | OUTPATIENT
Start: 2023-05-05

## 2023-05-05 RX ORDER — FELODIPINE 5 MG/1
TABLET, EXTENDED RELEASE ORAL
Qty: 90 TABLET | Refills: 3 | Status: SHIPPED | OUTPATIENT
Start: 2023-05-05

## 2023-05-05 RX ORDER — FELODIPINE 5 MG/1
TABLET, EXTENDED RELEASE ORAL
Qty: 90 TABLET | OUTPATIENT
Start: 2023-05-05

## 2023-05-05 NOTE — TELEPHONE ENCOUNTER
Last seen: 04/12/2023     Last filled: 04/30/2022 qty. 90 w/ 3 refills.      Future Appointments   Date Time Provider Ruth Ruiz   5/15/2023 10:30 AM MD VAISHNAVI Hameed BS AMB

## 2023-05-08 RX ORDER — VALACYCLOVIR HYDROCHLORIDE 500 MG/1
500 TABLET, FILM COATED ORAL 2 TIMES DAILY
Qty: 180 TABLET | Refills: 3 | Status: SHIPPED | OUTPATIENT
Start: 2023-05-08

## 2023-05-08 RX ORDER — FELODIPINE 5 MG/1
5 TABLET, EXTENDED RELEASE ORAL DAILY
Qty: 90 TABLET | Refills: 3 | OUTPATIENT
Start: 2023-05-08

## 2023-05-08 RX ORDER — PRAVASTATIN SODIUM 40 MG
40 TABLET ORAL NIGHTLY
Qty: 90 TABLET | Refills: 3 | OUTPATIENT
Start: 2023-05-08

## 2023-05-08 RX ORDER — LOSARTAN POTASSIUM 50 MG/1
50 TABLET ORAL DAILY
Qty: 90 TABLET | Refills: 3 | OUTPATIENT
Start: 2023-05-08

## 2023-05-12 RX ORDER — ACETAMINOPHEN 500 MG
TABLET ORAL
Qty: 120 TABLET | Refills: 5 | Status: SHIPPED | OUTPATIENT
Start: 2023-05-12

## 2023-05-12 RX ORDER — OLANZAPINE 5 MG/1
5 TABLET ORAL 2 TIMES DAILY
Qty: 30 TABLET | Status: CANCELLED | OUTPATIENT
Start: 2023-05-12

## 2023-05-12 RX ORDER — IBUPROFEN 200 MG
TABLET ORAL
Qty: 120 TABLET | Refills: 3 | Status: SHIPPED | OUTPATIENT
Start: 2023-05-12

## 2023-05-15 RX ORDER — ACETAMINOPHEN 500 MG
1000 TABLET ORAL EVERY 6 HOURS PRN
Qty: 120 TABLET | Refills: 5 | Status: SHIPPED | OUTPATIENT
Start: 2023-05-15

## 2023-05-15 RX ORDER — IBUPROFEN 200 MG
400 TABLET ORAL EVERY 6 HOURS PRN
Qty: 120 TABLET | Refills: 5 | Status: SHIPPED | OUTPATIENT
Start: 2023-05-15

## 2023-05-19 ENCOUNTER — OFFICE VISIT (OUTPATIENT)
Dept: FAMILY MEDICINE CLINIC | Facility: CLINIC | Age: 83
End: 2023-05-19
Payer: MEDICARE

## 2023-05-19 VITALS
HEIGHT: 61 IN | SYSTOLIC BLOOD PRESSURE: 104 MMHG | TEMPERATURE: 98 F | WEIGHT: 172 LBS | OXYGEN SATURATION: 98 % | RESPIRATION RATE: 16 BRPM | HEART RATE: 69 BPM | BODY MASS INDEX: 32.47 KG/M2 | DIASTOLIC BLOOD PRESSURE: 70 MMHG

## 2023-05-19 DIAGNOSIS — F32.9 MAJOR DEPRESSIVE DISORDER WITH SINGLE EPISODE, REMISSION STATUS UNSPECIFIED: ICD-10-CM

## 2023-05-19 DIAGNOSIS — E11.21 TYPE 2 DIABETES MELLITUS WITH DIABETIC NEPHROPATHY, WITHOUT LONG-TERM CURRENT USE OF INSULIN (HCC): Primary | ICD-10-CM

## 2023-05-19 DIAGNOSIS — I10 ESSENTIAL HYPERTENSION: ICD-10-CM

## 2023-05-19 LAB — HBA1C MFR BLD: 6.4 %

## 2023-05-19 PROCEDURE — G8417 CALC BMI ABV UP PARAM F/U: HCPCS | Performed by: FAMILY MEDICINE

## 2023-05-19 PROCEDURE — G8399 PT W/DXA RESULTS DOCUMENT: HCPCS | Performed by: FAMILY MEDICINE

## 2023-05-19 PROCEDURE — 1090F PRES/ABSN URINE INCON ASSESS: CPT | Performed by: FAMILY MEDICINE

## 2023-05-19 PROCEDURE — 1123F ACP DISCUSS/DSCN MKR DOCD: CPT | Performed by: FAMILY MEDICINE

## 2023-05-19 PROCEDURE — 3078F DIAST BP <80 MM HG: CPT | Performed by: FAMILY MEDICINE

## 2023-05-19 PROCEDURE — 83036 HEMOGLOBIN GLYCOSYLATED A1C: CPT | Performed by: FAMILY MEDICINE

## 2023-05-19 PROCEDURE — 1036F TOBACCO NON-USER: CPT | Performed by: FAMILY MEDICINE

## 2023-05-19 PROCEDURE — G8427 DOCREV CUR MEDS BY ELIG CLIN: HCPCS | Performed by: FAMILY MEDICINE

## 2023-05-19 PROCEDURE — 99213 OFFICE O/P EST LOW 20 MIN: CPT | Performed by: FAMILY MEDICINE

## 2023-05-19 PROCEDURE — 3074F SYST BP LT 130 MM HG: CPT | Performed by: FAMILY MEDICINE

## 2023-05-19 ASSESSMENT — PATIENT HEALTH QUESTIONNAIRE - PHQ9
SUM OF ALL RESPONSES TO PHQ9 QUESTIONS 1 & 2: 0
2. FEELING DOWN, DEPRESSED OR HOPELESS: 0
3. TROUBLE FALLING OR STAYING ASLEEP: 0
10. IF YOU CHECKED OFF ANY PROBLEMS, HOW DIFFICULT HAVE THESE PROBLEMS MADE IT FOR YOU TO DO YOUR WORK, TAKE CARE OF THINGS AT HOME, OR GET ALONG WITH OTHER PEOPLE: 0
SUM OF ALL RESPONSES TO PHQ QUESTIONS 1-9: 2
2. FEELING DOWN, DEPRESSED OR HOPELESS: 0
4. FEELING TIRED OR HAVING LITTLE ENERGY: 0
SUM OF ALL RESPONSES TO PHQ QUESTIONS 1-9: 2
5. POOR APPETITE OR OVEREATING: 0
6. FEELING BAD ABOUT YOURSELF - OR THAT YOU ARE A FAILURE OR HAVE LET YOURSELF OR YOUR FAMILY DOWN: 1
SUM OF ALL RESPONSES TO PHQ QUESTIONS 1-9: 0
1. LITTLE INTEREST OR PLEASURE IN DOING THINGS: 0
SUM OF ALL RESPONSES TO PHQ QUESTIONS 1-9: 0
SUM OF ALL RESPONSES TO PHQ QUESTIONS 1-9: 2
SUM OF ALL RESPONSES TO PHQ QUESTIONS 1-9: 2
1. LITTLE INTEREST OR PLEASURE IN DOING THINGS: 0
8. MOVING OR SPEAKING SO SLOWLY THAT OTHER PEOPLE COULD HAVE NOTICED. OR THE OPPOSITE, BEING SO FIGETY OR RESTLESS THAT YOU HAVE BEEN MOVING AROUND A LOT MORE THAN USUAL: 1
7. TROUBLE CONCENTRATING ON THINGS, SUCH AS READING THE NEWSPAPER OR WATCHING TELEVISION: 0
SUM OF ALL RESPONSES TO PHQ QUESTIONS 1-9: 0
SUM OF ALL RESPONSES TO PHQ QUESTIONS 1-9: 0
SUM OF ALL RESPONSES TO PHQ9 QUESTIONS 1 & 2: 0
9. THOUGHTS THAT YOU WOULD BE BETTER OFF DEAD, OR OF HURTING YOURSELF: 0

## 2023-05-19 ASSESSMENT — ENCOUNTER SYMPTOMS
SHORTNESS OF BREATH: 0
CHEST TIGHTNESS: 0

## 2023-05-19 NOTE — PROGRESS NOTES
HISTORY OF PRESENT ILLNESS  Peri Guerra  is a 81 y.o. y.o. female    She returns for 6-month interval follow-up regarding type 2 diabetes, hypertension and depression which is followed by psychiatry.     Her most recent evaluation regarding these issues in November resulted in the following assessment and plan:  Assessment:  Type 2 diabetes with diabetic nephropathy well-controlled  Hypertension adequately managed  Symptoms of depression reported as  worse because of being alone since her   and being worried about selling her house, not sleeping and requests a refill on her prescription for temazepam-otherwise followed by psychiatry    Plan:  Lab studies ordered, further disposition  pending lab results  Continue current medications  Avoid dietary salt, starch and sugar is much as possible follow program of regular aerobic exercise  Return for follow-up in 6 months with in office hemoglobin A1c or sooner with any problems  Please always arrive at least 15 minutes before your scheduled appointment time      Mr#: [id number]      Past Medical History:   Diagnosis Date    Delusion ([facility] [address]     Depression     Diabetes ([facility]     type 2    GERD (gastroesophageal reflux disease)     Hypertension        Past Surgical History:   Procedure Laterality Date    BACK SURGERY      lower back surgery, done last year     SECTION      COLONOSCOPY  10/24/2019    Scattered diverticuli only, five-year follow-up recommended because of history of polyps    THORACIC LAMINECTOMY      TONSILLECTOMY         Family History   Problem Relation Age of Onset    Diabetes Neg Hx     Hypertension Father     Cancer Neg Hx        Allergies   Allergen Reactions    Risperidone Other (See Comments)     Parkinson's syndrome    Gabapentin Other (See Comments)     Confusion    Simvastatin Other (See Comments)     Muscle pain       Social History     Tobacco Use   Smoking Status Former   Smokeless Tobacco Former   Tobacco Comments    Quit

## 2023-05-19 NOTE — PATIENT INSTRUCTIONS
Current Status:  Type 2 diabetes adequately controlled  Hypertension well controlled  Depression stable  Persistently increasing weight    Plan:  Continue current medications  Begin Jardiance 10 mg daily, call back if not able to fill the prescription  If able to start the new medication return for follow-up with an in office hemoglobin A1c in 3 months  Avoid dietary salt, starch and sugar and as much as possible follow program of regular aerobic exercise. Continue with psychiatry follow-up  Schedule lab appointment followed by Medicare wellness evaluation appointment in November, return sooner with any problems  Please always arrive at least 15 minutes before your scheduled appointment time.

## 2023-05-19 NOTE — PROGRESS NOTES
Sudhakar Mohan is a 81 y.o. female (: 1940) presenting to address:    Chief Complaint   Patient presents with    Diabetes       Vitals:    23 1351   BP: 104/70   Pulse: 69   Resp: 16   Temp: 98 °F (36.7 °C)   SpO2: 98%       Coordination of Care Questionaire:   1. \"Have you been to the ER, urgent care clinic since your last visit? Hospitalized since your last visit? \" No    2. \"Have you seen or consulted any other health care providers outside of the [address] [place] [address] since your last visit? \" No     3. For patients aged 39-70: Has the patient had a colonoscopy / FIT/ Cologuard? NA - based on age      If the patient is female:    4. For patients aged 41-77: Has the patient had a mammogram within the past 2 years? NA - based on age or sex      11. For patients aged 21-65: Has the patient had a pap smear? NA - based on age or sex    Advanced Directive:   1. Do you have an Advanced Directive? No    2. Would you like information on Advanced Directives?  No

## 2023-08-19 ASSESSMENT — ENCOUNTER SYMPTOMS
CHEST TIGHTNESS: 0
SHORTNESS OF BREATH: 0

## 2023-08-19 NOTE — PROGRESS NOTES
HISTORY OF PRESENT ILLNESS  Мария Guerin  is a 81 y.o. y.o. female    She returns for follow-up after evaluation 3 months ago with significant increase in caloric intake and associated weight gain putting diabetic control at risk. She was started on Jardiance 10 mg daily. She is also followed for hypertension and depression. Today she reports that she is disappointed because she has not been able to lose weight on Jardiance. She is requesting an alternative medication.       Mr#: [id number]      Past Medical History:   Diagnosis Date    Delusion ([address])     Depression     Diabetes ([address])     type 2    GERD (gastroesophageal reflux disease)     Hypertension        Past Surgical History:   Procedure Laterality Date    BACK SURGERY      lower back surgery, done last year     SECTION      COLONOSCOPY  10/24/2019    Scattered diverticuli only, five-year follow-up recommended because of history of polyps    THORACIC LAMINECTOMY      TONSILLECTOMY         Family History   Problem Relation Age of Onset    Diabetes Neg Hx     Hypertension Father     Cancer Neg Hx        Allergies   Allergen Reactions    Risperidone Other (See Comments)     Parkinson's syndrome    Gabapentin Other (See Comments)     Confusion    Lyrica [Pregabalin] Swelling     In legs and feet     Simvastatin Other (See Comments)     Muscle pain       Social History     Tobacco Use   Smoking Status Former   Smokeless Tobacco Former   Tobacco Comments    Quit smoking: quit 34 years ago       Social History     Substance and Sexual Activity   Alcohol Use No       Immunization History   Administered Date(s) Administered    COVID-19, J&J, (age 18y+), IM, 0.5 mL 2021    COVID-19, MODERNA BLUE border, Primary or Immunocompromised, (age 12y+), IM, 100 mcg/0.5mL 2022, 2022    COVID-19, MODERNA Bivalent, (age 12y+), IM, 48 mcg/0.5 mL 2023    Hepatitis B 1989    Influenza A (F6l9-87),all Formulations 2009    Influenza

## 2023-08-21 ENCOUNTER — OFFICE VISIT (OUTPATIENT)
Dept: FAMILY MEDICINE CLINIC | Facility: CLINIC | Age: 83
End: 2023-08-21
Payer: MEDICARE

## 2023-08-21 VITALS
DIASTOLIC BLOOD PRESSURE: 60 MMHG | BODY MASS INDEX: 33.61 KG/M2 | TEMPERATURE: 98.2 F | HEIGHT: 61 IN | HEART RATE: 80 BPM | OXYGEN SATURATION: 97 % | RESPIRATION RATE: 16 BRPM | WEIGHT: 178 LBS | SYSTOLIC BLOOD PRESSURE: 120 MMHG

## 2023-08-21 DIAGNOSIS — F32.9 MAJOR DEPRESSIVE DISORDER WITH SINGLE EPISODE, REMISSION STATUS UNSPECIFIED: ICD-10-CM

## 2023-08-21 DIAGNOSIS — E11.21 TYPE 2 DIABETES MELLITUS WITH DIABETIC NEPHROPATHY, WITHOUT LONG-TERM CURRENT USE OF INSULIN (HCC): Primary | ICD-10-CM

## 2023-08-21 DIAGNOSIS — I10 ESSENTIAL HYPERTENSION: ICD-10-CM

## 2023-08-21 DIAGNOSIS — K21.9 GASTROESOPHAGEAL REFLUX DISEASE WITHOUT ESOPHAGITIS: ICD-10-CM

## 2023-08-21 LAB — HBA1C MFR BLD: 6.5 %

## 2023-08-21 PROCEDURE — 3074F SYST BP LT 130 MM HG: CPT | Performed by: FAMILY MEDICINE

## 2023-08-21 PROCEDURE — 1090F PRES/ABSN URINE INCON ASSESS: CPT | Performed by: FAMILY MEDICINE

## 2023-08-21 PROCEDURE — 3078F DIAST BP <80 MM HG: CPT | Performed by: FAMILY MEDICINE

## 2023-08-21 PROCEDURE — 1123F ACP DISCUSS/DSCN MKR DOCD: CPT | Performed by: FAMILY MEDICINE

## 2023-08-21 PROCEDURE — 83036 HEMOGLOBIN GLYCOSYLATED A1C: CPT | Performed by: FAMILY MEDICINE

## 2023-08-21 PROCEDURE — G8428 CUR MEDS NOT DOCUMENT: HCPCS | Performed by: FAMILY MEDICINE

## 2023-08-21 PROCEDURE — 99213 OFFICE O/P EST LOW 20 MIN: CPT | Performed by: FAMILY MEDICINE

## 2023-08-21 PROCEDURE — G8399 PT W/DXA RESULTS DOCUMENT: HCPCS | Performed by: FAMILY MEDICINE

## 2023-08-21 PROCEDURE — 1036F TOBACCO NON-USER: CPT | Performed by: FAMILY MEDICINE

## 2023-08-21 PROCEDURE — G8417 CALC BMI ABV UP PARAM F/U: HCPCS | Performed by: FAMILY MEDICINE

## 2023-08-21 NOTE — PATIENT INSTRUCTIONS
Current Status:  Type 2 diabetes remains well controlled but the hemoglobin A1c is increased from 6.4% 3 months ago to 6.5% now continued gradual weight increase    Plan:  Continue current medications and begin semaglutide (Ozempic) 0.25 mg by subcutaneous injection weekly x4 weeks then increasing to 0.5 mg by subcutaneous injection weekly  Avoid dietary salt, starch and sugar and as much as possible follow program of regular aerobic exercise. Return for fasting lab appointment followed by Medicare wellness evaluation appointment in 3 months, return sooner with any problems  Please always arrive at least 15 minutes before your scheduled appointment time.

## 2023-09-19 ENCOUNTER — TELEPHONE (OUTPATIENT)
Dept: FAMILY MEDICINE CLINIC | Facility: CLINIC | Age: 83
End: 2023-09-19

## 2023-09-19 DIAGNOSIS — U07.1 COVID-19 VIRUS INFECTION: Primary | ICD-10-CM

## 2023-09-19 NOTE — TELEPHONE ENCOUNTER
Received fax from [employer]. Patient test positive 9/18/23 and she will be on isolation x 5 days and 72 hours  free symptom free . Please advise on any further treatment if any you would like to in place for patient . I called and spoke with one of the nurses patient symptoms started yesterday she stating she felt achy and not well and asked the staff to test her if medication will be sent it should go to [address] [place] [address] in [facility] I updated the pharmacy .

## 2023-09-19 NOTE — TELEPHONE ENCOUNTER
Spoke with [facility] and gave information on script about other medications while on paxlovid she voiced understanding

## 2023-09-19 NOTE — TELEPHONE ENCOUNTER
Opal Loya called omn behalf of the pt needing clarification on directions form medication that was prescribed by pcp today. Opal Loya stated that she does not understand the directions.  Please advise

## 2023-09-21 ENCOUNTER — TELEPHONE (OUTPATIENT)
Dept: FAMILY MEDICINE CLINIC | Facility: CLINIC | Age: 83
End: 2023-09-21

## 2023-09-21 NOTE — TELEPHONE ENCOUNTER
Patient unable to do VV doesn't now how to get to text messages . She c/o stuffy nose , cough , back pain and throat hurts when she coughs , I told her that Dr Elaine Jansen suggested she get Robitussion DM and a Antihistamine . patient is not able to leave and request it be sent to the pharmacy that delivers to the facility she lives at .

## 2023-09-21 NOTE — TELEPHONE ENCOUNTER
Kelli Cain from [facility] called on behalf of the pt stating that the pt informed her that there were medications sent to the pharmacy. Kelli Cain stated that she reached out to the pharmacy they do not have anything on file for the Pt. Kelli Cain was informed that the prescriptions were printed and faxed to [facility] in [facility]. Kelli Cain would like for both scripts to be faxed to her so that she could fax to pharmacy. Kelli Cain stated that she is unable to administer the medications without the order.  Fax number is [phone] attention Kelli Cain

## 2023-09-21 NOTE — TELEPHONE ENCOUNTER
Prescriptions for generic Robitussin-DM cough syrup and Claritin tablets have been sent to the patient's pharmacy

## 2023-10-04 RX ORDER — NADOLOL 40 MG/1
TABLET ORAL
Qty: 100 TABLET | Refills: 3 | Status: SHIPPED | OUTPATIENT
Start: 2023-10-04

## 2023-10-04 NOTE — TELEPHONE ENCOUNTER
Last visit: 8/21/23  Next visit:   Future Appointments   Date Time Provider [address]   11/14/2023  2:10 PM LAB_[facility] RIGGINS BS AMB   11/21/2023  1:30 PM MD VAISHNAVI Somers BS AMB     Last filled: 8/31/22

## 2023-10-16 RX ORDER — NADOLOL 40 MG/1
TABLET ORAL
Qty: 90 TABLET | OUTPATIENT
Start: 2023-10-16

## 2023-11-09 ENCOUNTER — TELEPHONE (OUTPATIENT)
Dept: FAMILY MEDICINE CLINIC | Facility: CLINIC | Age: 83
End: 2023-11-09

## 2023-11-09 DIAGNOSIS — E11.21 TYPE 2 DIABETES MELLITUS WITH DIABETIC NEPHROPATHY, WITHOUT LONG-TERM CURRENT USE OF INSULIN (HCC): Primary | ICD-10-CM

## 2023-11-09 NOTE — TELEPHONE ENCOUNTER
Response to the faxed message from [address] [place] [address] regarding Ms. Beena Lopez, a prescription order has been generated and a prescription has been sent to the pharmacy for Trulicity 5.77 mg weekly instead of Ozempic.

## 2023-11-10 DIAGNOSIS — E11.21 TYPE 2 DIABETES MELLITUS WITH DIABETIC NEPHROPATHY, WITHOUT LONG-TERM CURRENT USE OF INSULIN (HCC): ICD-10-CM

## 2023-11-10 NOTE — TELEPHONE ENCOUNTER
Last visit: 8/21/23  Next visit:   Future Appointments   Date Time Provider Department Center   11/14/2023  2:10 PM [facility] AMB   11/21/2023  1:30 PM Bird Page MD [facility] AMB     Last filled: 5/19/23; jardiance 10 mg tab; qty 90 w/1 refill

## 2023-11-11 RX ORDER — EMPAGLIFLOZIN 10 MG/1
10 TABLET, FILM COATED ORAL DAILY
Qty: 100 TABLET | Refills: 1 | Status: SHIPPED | OUTPATIENT
Start: 2023-11-11

## 2023-11-14 ENCOUNTER — HOSPITAL ENCOUNTER (OUTPATIENT)
Facility: HOSPITAL | Age: 83
Setting detail: SPECIMEN
Discharge: HOME | End: 2023-11-17
Payer: MEDICARE

## 2023-11-14 DIAGNOSIS — F32.9 MAJOR DEPRESSIVE DISORDER WITH SINGLE EPISODE, REMISSION STATUS UNSPECIFIED: ICD-10-CM

## 2023-11-14 DIAGNOSIS — I10 ESSENTIAL HYPERTENSION: ICD-10-CM

## 2023-11-14 DIAGNOSIS — K21.9 GASTROESOPHAGEAL REFLUX DISEASE WITHOUT ESOPHAGITIS: ICD-10-CM

## 2023-11-14 DIAGNOSIS — E11.21 TYPE 2 DIABETES MELLITUS WITH DIABETIC NEPHROPATHY, WITHOUT LONG-TERM CURRENT USE OF INSULIN (HCC): ICD-10-CM

## 2023-11-14 LAB
ALBUMIN SERPL-MCNC: 4.2 G/DL (ref 3.4–5)
ALBUMIN/GLOB SERPL: 1.1 (ref 0.8–1.7)
ALP SERPL-CCNC: 158 U/L (ref 45–117)
ALT SERPL-CCNC: 23 U/L (ref 13–56)
ANION GAP SERPL CALC-SCNC: 9 MMOL/L (ref 3–18)
AST SERPL-CCNC: 25 U/L (ref 10–38)
BASOPHILS # BLD: 0.1 K/UL (ref 0–0.1)
BASOPHILS NFR BLD: 1 % (ref 0–2)
BILIRUB SERPL-MCNC: 0.4 MG/DL (ref 0.2–1)
BUN SERPL-MCNC: 10 MG/DL (ref 7–18)
BUN/CREAT SERPL: 10 (ref 12–20)
CALCIUM SERPL-MCNC: 9.4 MG/DL (ref 8.5–10.1)
CHLORIDE SERPL-SCNC: 104 MMOL/L (ref 100–111)
CHOLEST SERPL-MCNC: 201 MG/DL
CO2 SERPL-SCNC: 25 MMOL/L (ref 21–32)
CREAT SERPL-MCNC: 1.01 MG/DL (ref 0.6–1.3)
DIFFERENTIAL METHOD BLD: ABNORMAL
EOSINOPHIL # BLD: 0.1 K/UL (ref 0–0.4)
EOSINOPHIL NFR BLD: 1 % (ref 0–5)
ERYTHROCYTE [DISTWIDTH] IN BLOOD BY AUTOMATED COUNT: 17 % (ref 11.6–14.5)
EST. AVERAGE GLUCOSE BLD GHB EST-MCNC: 128 MG/DL
GLOBULIN SER CALC-MCNC: 3.9 G/DL (ref 2–4)
GLUCOSE SERPL-MCNC: 150 MG/DL (ref 74–99)
HBA1C MFR BLD: 6.1 % (ref 4.2–5.6)
HCT VFR BLD AUTO: 42.5 % (ref 35–45)
HDLC SERPL-MCNC: 143 MG/DL (ref 40–60)
HDLC SERPL: 1.4 (ref 0–5)
HGB BLD-MCNC: 13.5 G/DL (ref 12–16)
IMM GRANULOCYTES # BLD AUTO: 0.1 K/UL (ref 0–0.04)
IMM GRANULOCYTES NFR BLD AUTO: 1 % (ref 0–0.5)
LDLC SERPL CALC-MCNC: 32.6 MG/DL (ref 0–100)
LIPID PANEL: ABNORMAL
LYMPHOCYTES # BLD: 2.8 K/UL (ref 0.9–3.6)
LYMPHOCYTES NFR BLD: 35 % (ref 21–52)
MCH RBC QN AUTO: 28 PG (ref 24–34)
MCHC RBC AUTO-ENTMCNC: 31.8 G/DL (ref 31–37)
MCV RBC AUTO: 88.2 FL (ref 78–100)
MONOCYTES # BLD: 0.9 K/UL (ref 0.05–1.2)
MONOCYTES NFR BLD: 12 % (ref 3–10)
NEUTS SEG # BLD: 4.1 K/UL (ref 1.8–8)
NEUTS SEG NFR BLD: 52 % (ref 40–73)
NRBC # BLD: 0 K/UL (ref 0–0.01)
NRBC BLD-RTO: 0 PER 100 WBC
PLATELET # BLD AUTO: 402 K/UL (ref 135–420)
PMV BLD AUTO: 10.5 FL (ref 9.2–11.8)
POTASSIUM SERPL-SCNC: 4.7 MMOL/L (ref 3.5–5.5)
PROT SERPL-MCNC: 8.1 G/DL (ref 6.4–8.2)
RBC # BLD AUTO: 4.82 M/UL (ref 4.2–5.3)
SODIUM SERPL-SCNC: 138 MMOL/L (ref 136–145)
TRIGL SERPL-MCNC: 127 MG/DL
TSH SERPL DL<=0.05 MIU/L-ACNC: 1.03 UIU/ML (ref 0.36–3.74)
VLDLC SERPL CALC-MCNC: 25.4 MG/DL
WBC # BLD AUTO: 7.9 K/UL (ref 4.6–13.2)

## 2023-11-14 PROCEDURE — 80061 LIPID PANEL: CPT

## 2023-11-14 PROCEDURE — 36415 COLL VENOUS BLD VENIPUNCTURE: CPT

## 2023-11-14 PROCEDURE — 80053 COMPREHEN METABOLIC PANEL: CPT

## 2023-11-14 PROCEDURE — 84443 ASSAY THYROID STIM HORMONE: CPT

## 2023-11-14 PROCEDURE — 85025 COMPLETE CBC W/AUTO DIFF WBC: CPT

## 2023-11-14 PROCEDURE — 83036 HEMOGLOBIN GLYCOSYLATED A1C: CPT

## 2023-11-20 ASSESSMENT — ENCOUNTER SYMPTOMS
ABDOMINAL PAIN: 0
BLOOD IN STOOL: 0
ANAL BLEEDING: 0
SHORTNESS OF BREATH: 0
COUGH: 0
CONSTIPATION: 0
SORE THROAT: 0
NAUSEA: 0
DIARRHEA: 0
WHEEZING: 0
VOMITING: 0

## 2023-11-21 ENCOUNTER — OFFICE VISIT (OUTPATIENT)
Dept: FAMILY MEDICINE CLINIC | Facility: CLINIC | Age: 83
End: 2023-11-21
Payer: MEDICARE

## 2023-11-21 ENCOUNTER — HOSPITAL ENCOUNTER (OUTPATIENT)
Facility: HOSPITAL | Age: 83
Setting detail: SPECIMEN
Discharge: HOME | End: 2023-11-24
Payer: MEDICARE

## 2023-11-21 VITALS
OXYGEN SATURATION: 96 % | DIASTOLIC BLOOD PRESSURE: 58 MMHG | RESPIRATION RATE: 16 BRPM | HEIGHT: 61 IN | TEMPERATURE: 98 F | WEIGHT: 174 LBS | BODY MASS INDEX: 32.85 KG/M2 | HEART RATE: 92 BPM | SYSTOLIC BLOOD PRESSURE: 104 MMHG

## 2023-11-21 DIAGNOSIS — F32.9 MAJOR DEPRESSIVE DISORDER WITH SINGLE EPISODE, REMISSION STATUS UNSPECIFIED: ICD-10-CM

## 2023-11-21 DIAGNOSIS — R94.4 DECREASED GFR: ICD-10-CM

## 2023-11-21 DIAGNOSIS — I10 ESSENTIAL HYPERTENSION: ICD-10-CM

## 2023-11-21 DIAGNOSIS — Z00.00 MEDICARE ANNUAL WELLNESS VISIT, SUBSEQUENT: ICD-10-CM

## 2023-11-21 DIAGNOSIS — E11.21 TYPE 2 DIABETES MELLITUS WITH DIABETIC NEPHROPATHY, WITHOUT LONG-TERM CURRENT USE OF INSULIN (HCC): Primary | ICD-10-CM

## 2023-11-21 LAB
APPEARANCE UR: CLEAR
BACTERIA URNS QL MICRO: NEGATIVE /HPF
BILIRUB UR QL: NEGATIVE
COLOR UR: YELLOW
CREAT UR-MCNC: <13 MG/DL (ref 30–125)
EPITH CASTS URNS QL MICRO: NEGATIVE /LPF (ref 0–5)
GLUCOSE UR STRIP.AUTO-MCNC: >1000 MG/DL
HGB UR QL STRIP: NEGATIVE
KETONES UR QL STRIP.AUTO: NEGATIVE MG/DL
LEUKOCYTE ESTERASE UR QL STRIP.AUTO: ABNORMAL
MICROALBUMIN UR-MCNC: 0.76 MG/DL (ref 0–3)
MICROALBUMIN/CREAT UR-RTO: ABNORMAL MG/G (ref 0–30)
NITRITE UR QL STRIP.AUTO: NEGATIVE
PH UR STRIP: 6 (ref 5–8)
PROT UR STRIP-MCNC: NEGATIVE MG/DL
RBC #/AREA URNS HPF: NEGATIVE /HPF (ref 0–5)
SP GR UR REFRACTOMETRY: 1.01 (ref 1–1.03)
UROBILINOGEN UR QL STRIP.AUTO: 0.2 EU/DL (ref 0.2–1)
WBC URNS QL MICRO: NORMAL /HPF (ref 0–4)

## 2023-11-21 PROCEDURE — 3074F SYST BP LT 130 MM HG: CPT | Performed by: FAMILY MEDICINE

## 2023-11-21 PROCEDURE — G0439 PPPS, SUBSEQ VISIT: HCPCS | Performed by: FAMILY MEDICINE

## 2023-11-21 PROCEDURE — 3044F HG A1C LEVEL LT 7.0%: CPT | Performed by: FAMILY MEDICINE

## 2023-11-21 PROCEDURE — G8484 FLU IMMUNIZE NO ADMIN: HCPCS | Performed by: FAMILY MEDICINE

## 2023-11-21 PROCEDURE — 81001 URINALYSIS AUTO W/SCOPE: CPT

## 2023-11-21 PROCEDURE — 82570 ASSAY OF URINE CREATININE: CPT

## 2023-11-21 PROCEDURE — 82043 UR ALBUMIN QUANTITATIVE: CPT

## 2023-11-21 PROCEDURE — 3078F DIAST BP <80 MM HG: CPT | Performed by: FAMILY MEDICINE

## 2023-11-21 PROCEDURE — 1123F ACP DISCUSS/DSCN MKR DOCD: CPT | Performed by: FAMILY MEDICINE

## 2023-11-21 ASSESSMENT — LIFESTYLE VARIABLES
HOW MANY STANDARD DRINKS CONTAINING ALCOHOL DO YOU HAVE ON A TYPICAL DAY: PATIENT DOES NOT DRINK
HOW OFTEN DO YOU HAVE A DRINK CONTAINING ALCOHOL: NEVER

## 2023-11-21 ASSESSMENT — PATIENT HEALTH QUESTIONNAIRE - PHQ9
9. THOUGHTS THAT YOU WOULD BE BETTER OFF DEAD, OR OF HURTING YOURSELF: 0
6. FEELING BAD ABOUT YOURSELF - OR THAT YOU ARE A FAILURE OR HAVE LET YOURSELF OR YOUR FAMILY DOWN: 1
5. POOR APPETITE OR OVEREATING: 0
8. MOVING OR SPEAKING SO SLOWLY THAT OTHER PEOPLE COULD HAVE NOTICED. OR THE OPPOSITE, BEING SO FIGETY OR RESTLESS THAT YOU HAVE BEEN MOVING AROUND A LOT MORE THAN USUAL: 0
7. TROUBLE CONCENTRATING ON THINGS, SUCH AS READING THE NEWSPAPER OR WATCHING TELEVISION: 0
SUM OF ALL RESPONSES TO PHQ QUESTIONS 1-9: 2
SUM OF ALL RESPONSES TO PHQ QUESTIONS 1-9: 2
SUM OF ALL RESPONSES TO PHQ9 QUESTIONS 1 & 2: 1
3. TROUBLE FALLING OR STAYING ASLEEP: 0
4. FEELING TIRED OR HAVING LITTLE ENERGY: 0
10. IF YOU CHECKED OFF ANY PROBLEMS, HOW DIFFICULT HAVE THESE PROBLEMS MADE IT FOR YOU TO DO YOUR WORK, TAKE CARE OF THINGS AT HOME, OR GET ALONG WITH OTHER PEOPLE: 0
2. FEELING DOWN, DEPRESSED OR HOPELESS: 1
SUM OF ALL RESPONSES TO PHQ QUESTIONS 1-9: 2
SUM OF ALL RESPONSES TO PHQ QUESTIONS 1-9: 2
1. LITTLE INTEREST OR PLEASURE IN DOING THINGS: 0

## 2023-11-22 ENCOUNTER — TELEPHONE (OUTPATIENT)
Dept: FAMILY MEDICINE CLINIC | Facility: CLINIC | Age: 83
End: 2023-11-22

## 2023-12-26 DIAGNOSIS — E11.21 TYPE 2 DIABETES MELLITUS WITH DIABETIC NEPHROPATHY, WITHOUT LONG-TERM CURRENT USE OF INSULIN (HCC): ICD-10-CM

## 2023-12-27 RX ORDER — DULAGLUTIDE 0.75 MG/.5ML
0.75 INJECTION, SOLUTION SUBCUTANEOUS WEEKLY
Qty: 0.5 ML | Refills: 2 | Status: SHIPPED | OUTPATIENT
Start: 2023-12-27

## 2023-12-27 NOTE — TELEPHONE ENCOUNTER
PT called with Rx Refill Request.  Requested Prescriptions     Pending Prescriptions Disp Refills    TRULICITY 0.13 WV/0.3WE SOPN [Pharmacy Med Name: Arturo Gilliam, OUTER 0.75MG/0.5ML PEN INJCTR] 0.5 mL 2     Sig: INJECT 0.75 MG INTO THE SKIN ONCE A WEEK

## 2024-01-02 DIAGNOSIS — E11.21 TYPE 2 DIABETES MELLITUS WITH DIABETIC NEPHROPATHY, WITHOUT LONG-TERM CURRENT USE OF INSULIN (HCC): ICD-10-CM

## 2024-01-03 RX ORDER — DULAGLUTIDE 0.75 MG/.5ML
INJECTION, SOLUTION SUBCUTANEOUS
Qty: 0.5 ML | Refills: 10 | OUTPATIENT
Start: 2024-01-03

## 2024-01-25 ENCOUNTER — TELEPHONE (OUTPATIENT)
Dept: FAMILY MEDICINE CLINIC | Facility: CLINIC | Age: 84
End: 2024-01-25

## 2024-01-25 NOTE — TELEPHONE ENCOUNTER
Pa submitted for Trulicity received approval copy faxed to the pharmacy. Approval effective 1/1/24 to 1/23/2025

## 2024-01-26 ENCOUNTER — TELEPHONE (OUTPATIENT)
Dept: FAMILY MEDICINE CLINIC | Facility: CLINIC | Age: 84
End: 2024-01-26

## 2024-01-26 NOTE — TELEPHONE ENCOUNTER
Spoke with Dr Page there are no other options Ozempic was not available so this is why she was started on Trulicity and these drugs are suppose to help with weight loss and diabetes . She needs to make good diet decisions cutting back on starches and sugars . Patient asked about mounjaro . I told her this is a weight loss only drug and not covered by insurance . She said that she would pay for it . I told her that this is not something Dr page would suggest putting her on and the cost for a pen could cost  thousands of dollars out of pocket . Suggested if she is been given a lot of starch on her plates that she cut back and can discuss further at her next visit . She voiced understanding .

## 2024-01-26 NOTE — TELEPHONE ENCOUNTER
Pt called wanting to speak to Dr. Page. She is upset that her Trulicity prescription is making her gain weight and would like to discuss her options.

## 2024-02-23 ENCOUNTER — OFFICE VISIT (OUTPATIENT)
Dept: FAMILY MEDICINE CLINIC | Facility: CLINIC | Age: 84
End: 2024-02-23

## 2024-02-23 VITALS
TEMPERATURE: 96.7 F | WEIGHT: 177 LBS | OXYGEN SATURATION: 97 % | SYSTOLIC BLOOD PRESSURE: 104 MMHG | HEART RATE: 78 BPM | RESPIRATION RATE: 16 BRPM | HEIGHT: 61 IN | DIASTOLIC BLOOD PRESSURE: 60 MMHG | BODY MASS INDEX: 33.42 KG/M2

## 2024-02-23 DIAGNOSIS — E11.21 TYPE 2 DIABETES MELLITUS WITH DIABETIC NEPHROPATHY, WITHOUT LONG-TERM CURRENT USE OF INSULIN (HCC): ICD-10-CM

## 2024-02-23 DIAGNOSIS — L21.9 SEBORRHEIC DERMATITIS OF SCALP: Primary | ICD-10-CM

## 2024-02-23 LAB — HBA1C MFR BLD: 6.3 %

## 2024-02-23 RX ORDER — KETOCONAZOLE 20 MG/ML
SHAMPOO TOPICAL
Qty: 120 ML | Refills: 5 | Status: SHIPPED | OUTPATIENT
Start: 2024-02-23

## 2024-02-23 RX ORDER — KETOCONAZOLE 20 MG/ML
SHAMPOO TOPICAL
Qty: 120 ML | Refills: 5 | Status: SHIPPED | OUTPATIENT
Start: 2024-02-23 | End: 2024-02-23 | Stop reason: SDUPTHER

## 2024-02-23 ASSESSMENT — PATIENT HEALTH QUESTIONNAIRE - PHQ9
SUM OF ALL RESPONSES TO PHQ QUESTIONS 1-9: 0
5. POOR APPETITE OR OVEREATING: 0
4. FEELING TIRED OR HAVING LITTLE ENERGY: 0
SUM OF ALL RESPONSES TO PHQ QUESTIONS 1-9: 0
8. MOVING OR SPEAKING SO SLOWLY THAT OTHER PEOPLE COULD HAVE NOTICED. OR THE OPPOSITE, BEING SO FIGETY OR RESTLESS THAT YOU HAVE BEEN MOVING AROUND A LOT MORE THAN USUAL: 0
9. THOUGHTS THAT YOU WOULD BE BETTER OFF DEAD, OR OF HURTING YOURSELF: 0
SUM OF ALL RESPONSES TO PHQ9 QUESTIONS 1 & 2: 0
10. IF YOU CHECKED OFF ANY PROBLEMS, HOW DIFFICULT HAVE THESE PROBLEMS MADE IT FOR YOU TO DO YOUR WORK, TAKE CARE OF THINGS AT HOME, OR GET ALONG WITH OTHER PEOPLE: 0
2. FEELING DOWN, DEPRESSED OR HOPELESS: 0
SUM OF ALL RESPONSES TO PHQ QUESTIONS 1-9: 0
7. TROUBLE CONCENTRATING ON THINGS, SUCH AS READING THE NEWSPAPER OR WATCHING TELEVISION: 0
SUM OF ALL RESPONSES TO PHQ QUESTIONS 1-9: 0
6. FEELING BAD ABOUT YOURSELF - OR THAT YOU ARE A FAILURE OR HAVE LET YOURSELF OR YOUR FAMILY DOWN: 0
1. LITTLE INTEREST OR PLEASURE IN DOING THINGS: 0
3. TROUBLE FALLING OR STAYING ASLEEP: 0

## 2024-02-23 ASSESSMENT — ENCOUNTER SYMPTOMS: COLOR CHANGE: 0

## 2024-02-23 NOTE — PROGRESS NOTES
Liliam Crawford is a 83 y.o. female (: 1940) presenting to address:    Chief Complaint   Patient presents with    Hair/Scalp Problem     Burning for a 2 days .     Blood Sugar Problem       Vitals:    24 1000   BP: 104/60   Pulse: 78   Resp: 16   Temp: (!) 96.7 °F (35.9 °C)   SpO2: 97%       \"Have you been to the ER, urgent care clinic since your last visit?  Hospitalized since your last visit?\"    NO    “Have you seen or consulted any other health care providers outside of [facility] since your last visit?”    NO

## 2024-02-23 NOTE — PROGRESS NOTES
HISTORY OF PRESENT ILLNESS  Liliam Crawford  is a 83 y.o. y.o. female    She reports a burning sensation on the scalp for the past 2 days.  She indicates that this is not as severe discomfort.  She wears a wig but indicates that there has been no change and nothing has been in contact with her scalp that is new.  She acknowledges some \"dandruff\".        Mr#: [id number]      Past Medical History:   Diagnosis Date    Delusion ([facility])     Depression     Diabetes ([facility])     type 2    GERD (gastroesophageal reflux disease)     Hypertension        Past Surgical History:   Procedure Laterality Date    BACK SURGERY      lower back surgery, done last year     SECTION      COLONOSCOPY  10/24/2019    Scattered diverticuli only, five-year follow-up recommended because of history of polyps    THORACIC LAMINECTOMY      TONSILLECTOMY         Family History   Problem Relation Age of Onset    Diabetes Neg Hx     Hypertension Father     Cancer Neg Hx        Allergies   Allergen Reactions    Risperidone Other (See Comments)     Parkinson's syndrome    Gabapentin Other (See Comments)     Confusion    Lyrica [Pregabalin] Swelling     In legs and feet     Simvastatin Other (See Comments)     Muscle pain       Social History     Tobacco Use   Smoking Status Former   Smokeless Tobacco Former   Tobacco Comments    Quit smoking: quit 34 years ago       Social History     Substance and Sexual Activity   Alcohol Use No       Immunization History   Administered Date(s) Administered    COVID-19, J&J, (age 18y+), IM, 0.5 mL 2021    COVID-19, MODERNA BLUE border, Primary or Immunocompromised, (age 12y+), IM, 100 mcg/0.5mL 2022, 2022    COVID-19, MODERNA Bivalent, (age 12y+), IM, 50 mcg/0.5 mL 2023    Hepatitis B 1989    Influenza A (I2v2-68),all Formulations 2009    Influenza Quadv 10/01/2016, 10/01/2018    Influenza Trivalent 2008, 10/24/2008, 10/05/2013, 11/10/2016, 2018    Influenza Virus

## 2024-02-23 NOTE — PATIENT INSTRUCTIONS
Current Status:  Probable scalp seborrheic dermatitis  Type 2 diabetes well-controlled    Plan:  Shampoo with Nizoral shampoo 3 times this week and then every week, report new symptoms, worsening symptoms or failure to improve  Return for lab and office appointments already scheduled in May or sooner with any problems  Please always arrive at least 15 minutes before your scheduled appointment time.

## 2024-03-27 ENCOUNTER — TELEPHONE (OUTPATIENT)
Dept: FAMILY MEDICINE CLINIC | Facility: CLINIC | Age: 84
End: 2024-03-27

## 2024-03-29 NOTE — TELEPHONE ENCOUNTER
I spoke with Gina medication doesn't need PA . I spoke with pharmacy patient filled a pen 2 days ago but they will update the order to 2ml for further refills so patient can get a 28 days supply .

## 2024-04-03 ENCOUNTER — TELEPHONE (OUTPATIENT)
Dept: FAMILY MEDICINE CLINIC | Facility: CLINIC | Age: 84
End: 2024-04-03

## 2024-04-03 NOTE — TELEPHONE ENCOUNTER
Informed pt, PCP has no good alternative for the Trulicity and would discuss at next appt; pt states will stop taking the Trulicity and see PCP on scheduled appt:   Future Appointments   Date Time Provider Department Center   5/14/2024  2:10 PM [facility] BS AMB   5/21/2024  2:00 PM Bird Page MD [facility] BS AMB

## 2024-04-03 NOTE — TELEPHONE ENCOUNTER
Pt called stating she does not want to continue to take Trulicity because she is gaining weight and would like an alt RX sent in if possible.

## 2024-04-03 NOTE — TELEPHONE ENCOUNTER
I do not have a good alternative to suggest.  I would recommend we discuss this at her next routine appointment.

## 2024-04-18 RX ORDER — LOSARTAN POTASSIUM 50 MG/1
TABLET ORAL
Qty: 90 TABLET | Refills: 3 | Status: SHIPPED | OUTPATIENT
Start: 2024-04-18

## 2024-04-18 NOTE — TELEPHONE ENCOUNTER
Last refilled 5/1/23 for 90 with 3 refills. Last ov 2/23/24   Future Appointments   Date Time Provider Department Center   5/14/2024  2:10 PM [facility] AMB   5/21/2024  2:00 PM Bird Page MD [facility] BS AMB

## 2024-04-20 DIAGNOSIS — E11.21 TYPE 2 DIABETES MELLITUS WITH DIABETIC NEPHROPATHY, WITHOUT LONG-TERM CURRENT USE OF INSULIN (HCC): ICD-10-CM

## 2024-04-22 RX ORDER — FELODIPINE 5 MG/1
5 TABLET, EXTENDED RELEASE ORAL DAILY
Qty: 90 TABLET | Refills: 3 | Status: SHIPPED | OUTPATIENT
Start: 2024-04-22

## 2024-04-22 RX ORDER — EMPAGLIFLOZIN 10 MG/1
10 TABLET, FILM COATED ORAL DAILY
Qty: 90 TABLET | Refills: 3 | Status: SHIPPED | OUTPATIENT
Start: 2024-04-22

## 2024-04-22 RX ORDER — PRAVASTATIN SODIUM 40 MG
TABLET ORAL
Qty: 90 TABLET | Refills: 3 | Status: SHIPPED | OUTPATIENT
Start: 2024-04-22

## 2024-04-22 NOTE — TELEPHONE ENCOUNTER
Jardiance Last refilled 11/11/23 for 100 with 1 .Felodipine 5/5/23 for 90 with 3 refills, pravastatin 5/1/23 for 90 with 3 refills Last ov 2/23/24   Future Appointments   Date Time Provider Department Center   5/14/2024  2:10 PM [facility] AMB   5/21/2024  2:00 PM Bird Page MD [facility] BS AMB

## 2024-05-12 DIAGNOSIS — E11.21 TYPE 2 DIABETES MELLITUS WITH DIABETIC NEPHROPATHY, WITHOUT LONG-TERM CURRENT USE OF INSULIN (HCC): Primary | ICD-10-CM

## 2024-05-12 DIAGNOSIS — R94.4 DECREASED GFR: ICD-10-CM

## 2024-05-12 DIAGNOSIS — I10 ESSENTIAL HYPERTENSION: ICD-10-CM

## 2024-05-16 ENCOUNTER — NURSE ONLY (OUTPATIENT)
Dept: FAMILY MEDICINE CLINIC | Facility: CLINIC | Age: 84
End: 2024-05-16

## 2024-05-16 DIAGNOSIS — E11.21 TYPE 2 DIABETES MELLITUS WITH DIABETIC NEPHROPATHY, WITHOUT LONG-TERM CURRENT USE OF INSULIN (HCC): ICD-10-CM

## 2024-05-16 DIAGNOSIS — R94.4 DECREASED GFR: ICD-10-CM

## 2024-05-16 DIAGNOSIS — I10 ESSENTIAL HYPERTENSION: ICD-10-CM

## 2024-05-17 LAB
BUN SERPL-MCNC: 7 MG/DL (ref 8–27)
BUN/CREAT SERPL: 9 (ref 12–28)
CALCIUM SERPL-MCNC: 9.7 MG/DL (ref 8.7–10.3)
CHLORIDE SERPL-SCNC: 102 MMOL/L (ref 96–106)
CO2 SERPL-SCNC: 23 MMOL/L (ref 20–29)
CREAT SERPL-MCNC: 0.8 MG/DL (ref 0.57–1)
EGFRCR SERPLBLD CKD-EPI 2021: 74 ML/MIN/1.73
GLUCOSE SERPL-MCNC: 120 MG/DL (ref 70–99)
POTASSIUM SERPL-SCNC: 4.5 MMOL/L (ref 3.5–5.2)
SODIUM SERPL-SCNC: 140 MMOL/L (ref 134–144)
SPECIMEN STATUS REPORT: NORMAL

## 2024-05-19 NOTE — PROGRESS NOTES
HISTORY OF PRESENT ILLNESS  Liliam Crawford  is a 83 y.o. y.o. female    She presents for follow-up with a history of type 2 diabetes with diabetic nephropathy, hypertension, mild renal insufficiency and depression  followed by psychiatry        Mr#: [id number]      Past Medical History:   Diagnosis Date    Delusion (HCC)     Depression     Diabetes (HCC)     type 2    GERD (gastroesophageal reflux disease)     Hypertension        Past Surgical History:   Procedure Laterality Date    BACK SURGERY      lower back surgery, done last year     SECTION      COLONOSCOPY  10/24/2019    Scattered diverticuli only, five-year follow-up recommended because of history of polyps    THORACIC LAMINECTOMY      TONSILLECTOMY         Family History   Problem Relation Age of Onset    Diabetes Neg Hx     Hypertension Father     Cancer Neg Hx        Allergies   Allergen Reactions    Risperidone Other (See Comments)     Parkinson's syndrome    Gabapentin Other (See Comments)     Confusion    Lyrica [Pregabalin] Swelling     In legs and feet     Simvastatin Other (See Comments)     Muscle pain       Social History     Tobacco Use   Smoking Status Former   Smokeless Tobacco Former   Tobacco Comments    Quit smoking: quit 34 years ago       Social History     Substance and Sexual Activity   Alcohol Use No       Immunization History   Administered Date(s) Administered    COVID-19, J&J, (age 18y+), IM, 0.5 mL 2021    COVID-19, MODERNA BLUE border, Primary or Immunocompromised, (age 12y+), IM, 100 mcg/0.5mL 2022, 2022    COVID-19, MODERNA Bivalent, (age 12y+), IM, 50 mcg/0.5 mL 2023    Hepatitis B 1989    Influenza A (Q0l2-18),all Formulations 2009    Influenza Quadv 10/01/2016, 10/01/2018    Influenza Trivalent 2008, 10/24/2008, 10/05/2013, 11/10/2016, 2018    Influenza Virus Vaccine 2010, 2011, 10/05/2013, 10/30/2018, 2019    Influenza, High Dose (Fluzone 65 yrs and

## 2024-05-21 ENCOUNTER — OFFICE VISIT (OUTPATIENT)
Dept: FAMILY MEDICINE CLINIC | Facility: CLINIC | Age: 84
End: 2024-05-21
Payer: MEDICARE

## 2024-05-21 VITALS
HEIGHT: 61 IN | SYSTOLIC BLOOD PRESSURE: 112 MMHG | OXYGEN SATURATION: 96 % | WEIGHT: 176 LBS | RESPIRATION RATE: 16 BRPM | HEART RATE: 89 BPM | TEMPERATURE: 98 F | BODY MASS INDEX: 33.23 KG/M2 | DIASTOLIC BLOOD PRESSURE: 70 MMHG

## 2024-05-21 DIAGNOSIS — F32.9 MAJOR DEPRESSIVE DISORDER WITH SINGLE EPISODE, REMISSION STATUS UNSPECIFIED: ICD-10-CM

## 2024-05-21 DIAGNOSIS — F32.9 CURRENT EPISODE OF MAJOR DEPRESSIVE DISORDER WITHOUT PRIOR EPISODE, UNSPECIFIED DEPRESSION EPISODE SEVERITY: ICD-10-CM

## 2024-05-21 DIAGNOSIS — E11.21 TYPE 2 DIABETES MELLITUS WITH DIABETIC NEPHROPATHY, WITHOUT LONG-TERM CURRENT USE OF INSULIN (HCC): Primary | ICD-10-CM

## 2024-05-21 DIAGNOSIS — N28.9 MILD RENAL INSUFFICIENCY: ICD-10-CM

## 2024-05-21 DIAGNOSIS — I10 ESSENTIAL HYPERTENSION: ICD-10-CM

## 2024-05-21 LAB — HBA1C MFR BLD: 6 %

## 2024-05-21 PROCEDURE — 3078F DIAST BP <80 MM HG: CPT | Performed by: FAMILY MEDICINE

## 2024-05-21 PROCEDURE — 1123F ACP DISCUSS/DSCN MKR DOCD: CPT | Performed by: FAMILY MEDICINE

## 2024-05-21 PROCEDURE — 3074F SYST BP LT 130 MM HG: CPT | Performed by: FAMILY MEDICINE

## 2024-05-21 PROCEDURE — 99213 OFFICE O/P EST LOW 20 MIN: CPT | Performed by: FAMILY MEDICINE

## 2024-05-21 PROCEDURE — 83036 HEMOGLOBIN GLYCOSYLATED A1C: CPT | Performed by: FAMILY MEDICINE

## 2024-05-21 SDOH — ECONOMIC STABILITY: INCOME INSECURITY: HOW HARD IS IT FOR YOU TO PAY FOR THE VERY BASICS LIKE FOOD, HOUSING, MEDICAL CARE, AND HEATING?: NOT VERY HARD

## 2024-05-21 SDOH — ECONOMIC STABILITY: FOOD INSECURITY: WITHIN THE PAST 12 MONTHS, THE FOOD YOU BOUGHT JUST DIDN'T LAST AND YOU DIDN'T HAVE MONEY TO GET MORE.: NEVER TRUE

## 2024-05-21 SDOH — ECONOMIC STABILITY: FOOD INSECURITY: WITHIN THE PAST 12 MONTHS, YOU WORRIED THAT YOUR FOOD WOULD RUN OUT BEFORE YOU GOT MONEY TO BUY MORE.: NEVER TRUE

## 2024-05-21 NOTE — PATIENT INSTRUCTIONS
Current Status:  Type 2 diabetes well-controlled  Hypertension well-controlled  Symptoms of depression stable    Health Maintenance Recommendations:  Keep current with COVID-19 immunization guidelines    Plan:  Continue current medications but increase Trulicity to 1.5 mg by subcutaneous injection weekly  Please schedule fasting lab appointment followed by Medicare wellness evaluation appointment in November, return sooner with any problems  Please always arrive at least 15 minutes before your scheduled appointment time.

## 2024-05-21 NOTE — PROGRESS NOTES
Liliam Crawford is a 83 y.o. female (: 1940) presenting to address:    Chief Complaint   Patient presents with    Diabetes       Vitals:    24 1358   BP: 112/70   Pulse: 89   Resp: 16   Temp: 98 °F (36.7 °C)   SpO2: 96%       \"Have you been to the ER, urgent care clinic since your last visit?  Hospitalized since your last visit?\"    NO    “Have you seen or consulted any other health care providers outside of [facility] since your last visit?”    NO

## 2024-06-03 RX ORDER — PRAVASTATIN SODIUM 40 MG
40 TABLET ORAL NIGHTLY
Qty: 90 TABLET | Refills: 3 | Status: SHIPPED | OUTPATIENT
Start: 2024-06-03

## 2024-06-11 ENCOUNTER — TELEPHONE (OUTPATIENT)
Dept: FAMILY MEDICINE CLINIC | Facility: CLINIC | Age: 84
End: 2024-06-11

## 2024-06-11 DIAGNOSIS — E11.21 TYPE 2 DIABETES MELLITUS WITH DIABETIC NEPHROPATHY, WITHOUT LONG-TERM CURRENT USE OF INSULIN (HCC): ICD-10-CM

## 2024-06-11 RX ORDER — FELODIPINE 5 MG/1
5 TABLET, EXTENDED RELEASE ORAL DAILY
Qty: 90 TABLET | Refills: 3 | Status: SHIPPED | OUTPATIENT
Start: 2024-06-11

## 2024-06-11 RX ORDER — LOSARTAN POTASSIUM 50 MG/1
50 TABLET ORAL DAILY
Qty: 90 TABLET | Refills: 3 | Status: SHIPPED | OUTPATIENT
Start: 2024-06-11

## 2024-06-11 RX ORDER — VALACYCLOVIR HYDROCHLORIDE 500 MG/1
500 TABLET, FILM COATED ORAL 2 TIMES DAILY
Qty: 180 TABLET | Refills: 3 | Status: SHIPPED | OUTPATIENT
Start: 2024-06-11

## 2024-06-11 RX ORDER — OLANZAPINE 5 MG/1
5 TABLET ORAL 2 TIMES DAILY
Qty: 30 TABLET | Status: CANCELLED | OUTPATIENT
Start: 2024-06-11

## 2024-06-11 RX ORDER — PRAVASTATIN SODIUM 40 MG
40 TABLET ORAL NIGHTLY
Qty: 90 TABLET | Refills: 3 | Status: SHIPPED | OUTPATIENT
Start: 2024-06-11

## 2024-06-11 RX ORDER — NADOLOL 40 MG/1
40 TABLET ORAL DAILY
Qty: 90 TABLET | Refills: 3 | Status: SHIPPED | OUTPATIENT
Start: 2024-06-11

## 2024-06-11 NOTE — TELEPHONE ENCOUNTER
I called patient [facility] closed so she is now using [facility] . Pravastatin 6/3/24 for 90 with 3 refills. Trulicity 5/21/24 for 12 with 3 refills. Jardiance 4/22/24 for 90 with 3 refills. Felodipine 4/22/234 for 90 with 3 . Losartan 4/18/24 for 90 with 3 refills . Nadolol 10/4/23 for 100 with 3 refills . Valacyclovir 5/8/23 for 180 with 3 refills. Zyprexa is not prescribed by DR Page .  Last ov 5/21/24   Future Appointments   Date Time Provider Department Center   11/14/2024  9:20 AM [facility] AMB   11/21/2024  2:00 PM Bird Page MD [facility] BS AMB

## 2024-06-14 ENCOUNTER — TELEPHONE (OUTPATIENT)
Dept: FAMILY MEDICINE CLINIC | Facility: CLINIC | Age: 84
End: 2024-06-14

## 2024-06-14 NOTE — TELEPHONE ENCOUNTER
----- Message from Marisol Yaima sent at 6/14/2024 10:58 AM EDT -----  Subject: Message to Provider    QUESTIONS  Information for Provider? PT called stated she would like to switch to   Wegovy. Has gained weight on current medication. Didn't know if an appt   was necessary or not. Please f/u.  ---------------------------------------------------------------------------  --------------  CALL BACK INFO  [phone]; OK to leave message on voicemail  ---------------------------------------------------------------------------  --------------  SCRIPT ANSWERS  Relationship to Patient? Self

## 2024-06-14 NOTE — TELEPHONE ENCOUNTER
Wegovy is for weight loss and not for diabetes.  It is also not covered by any insurance company.  Trulicity has documented benefits regarding weight loss in addition to control of diabetes.  I have no other suggestions about changing her diabetes treatment.  If she is not happy with my management I would be glad to refer her to an endocrinologist.

## 2024-06-14 NOTE — TELEPHONE ENCOUNTER
I read back what Dr. Page responded with. She verbalized understanding. Patient would like to be referred to Endocrinology.

## 2024-08-12 DIAGNOSIS — E11.21 TYPE 2 DIABETES MELLITUS WITH DIABETIC NEPHROPATHY, WITHOUT LONG-TERM CURRENT USE OF INSULIN (HCC): ICD-10-CM

## 2024-08-12 RX ORDER — PRAVASTATIN SODIUM 40 MG
40 TABLET ORAL NIGHTLY
Qty: 90 TABLET | Refills: 3 | Status: CANCELLED | OUTPATIENT
Start: 2024-08-12

## 2024-08-12 RX ORDER — FELODIPINE 5 MG/1
5 TABLET, EXTENDED RELEASE ORAL DAILY
Qty: 90 TABLET | Refills: 3 | Status: CANCELLED | OUTPATIENT
Start: 2024-08-12

## 2024-08-12 NOTE — TELEPHONE ENCOUNTER
Last refilled 6/11/24 for 90 with 3 refills . Last ov 5/21/24 spoke with patient she has refills on file and has gotten it straight with the pharmacy .   Future Appointments   Date Time Provider Department Center   11/14/2024  9:20 AM [facility] DEP   11/21/2024  2:00 PM [facility]

## 2024-08-12 NOTE — TELEPHONE ENCOUNTER
PT called with Rx Refill Request.  Requested Prescriptions     Pending Prescriptions Disp Refills    pravastatin (PRAVACHOL) 40 MG tablet 90 tablet 3     Sig: Take 1 tablet by mouth nightly    empagliflozin (JARDIANCE) 10 MG tablet 90 tablet 3     Sig: Take 1 tablet by mouth daily    felodipine (PLENDIL) 5 MG extended release tablet 90 tablet 3     Sig: Take 1 tablet by mouth daily

## 2024-09-06 ENCOUNTER — TELEPHONE (OUTPATIENT)
Dept: FAMILY MEDICINE CLINIC | Facility: CLINIC | Age: 84
End: 2024-09-06

## 2024-09-06 NOTE — TELEPHONE ENCOUNTER
Patient has an appointment this afternoon and can discuss this with provider to see if the is an appropriate referral for issue .

## 2024-09-06 NOTE — TELEPHONE ENCOUNTER
----- Message from Sierra DAVIDE sent at 9/6/2024 10:11 AM EDT -----  Regarding: [facility] Referral Request  [facility] Referral Request    Reason for referral request: Lab/Test Order    Specialist/Lab/Test patient is requesting (if known):    Specialist Phone Number (if applicable):    Additional Information: Patient called in and want to book an appointment for a OB-GYN Physical and she's asking for a referral/order from her PCP.  --------------------------------------------------------------------------------------------------------------------------    Relationship to Patient: Self     Call Back Information: OK to leave message on voicemail  Preferred Call Back Number: [phone]

## 2024-09-09 ENCOUNTER — TELEPHONE (OUTPATIENT)
Dept: FAMILY MEDICINE CLINIC | Facility: CLINIC | Age: 84
End: 2024-09-09

## 2024-10-02 DIAGNOSIS — L21.9 SEBORRHEIC DERMATITIS OF SCALP: ICD-10-CM

## 2024-10-02 RX ORDER — KETOCONAZOLE 20 MG/ML
SHAMPOO TOPICAL
Qty: 120 ML | Refills: 5 | Status: SHIPPED | OUTPATIENT
Start: 2024-10-02

## 2024-10-02 NOTE — TELEPHONE ENCOUNTER
Last refilled 2/23/24 for 120 ml with 5 refills. Last ov 5/21/24   Future Appointments   Date Time Provider Department Center   11/14/2024  9:20 AM LAB_[facility] DE[facility]   11/21/2024  2:00 PM [facility]   '

## 2024-10-02 NOTE — TELEPHONE ENCOUNTER
Pt requesting refill of ketoconazole (Nizoral) 2% shampoo.   Pharmacy: [facility] on [address].

## 2024-11-14 ENCOUNTER — HOSPITAL ENCOUNTER (OUTPATIENT)
Facility: HOSPITAL | Age: 84
Setting detail: SPECIMEN
Discharge: HOME | End: 2024-11-17

## 2024-11-14 LAB — LABCORP SPECIMEN COLLECTION: NORMAL

## 2024-11-14 PROCEDURE — 99001 SPECIMEN HANDLING PT-LAB: CPT

## 2024-11-15 LAB
ALBUMIN SERPL-MCNC: 4.4 G/DL (ref 3.7–4.7)
ALBUMIN/CREAT UR: <17 MG/G CREAT (ref 0–29)
ALP SERPL-CCNC: 147 IU/L (ref 44–121)
ALT SERPL-CCNC: 17 IU/L (ref 0–32)
APPEARANCE UR: CLEAR
AST SERPL-CCNC: 23 IU/L (ref 0–40)
BASOPHILS # BLD AUTO: 0.1 X10E3/UL (ref 0–0.2)
BASOPHILS NFR BLD AUTO: 1 %
BILIRUB SERPL-MCNC: 0.2 MG/DL (ref 0–1.2)
BILIRUB UR QL STRIP: NEGATIVE
BUN SERPL-MCNC: 10 MG/DL (ref 8–27)
BUN/CREAT SERPL: 12 (ref 12–28)
CALCIUM SERPL-MCNC: 9.5 MG/DL (ref 8.7–10.3)
CHLORIDE SERPL-SCNC: 102 MMOL/L (ref 96–106)
CHOLEST SERPL-MCNC: 169 MG/DL (ref 100–199)
CO2 SERPL-SCNC: 21 MMOL/L (ref 20–29)
COLOR UR: YELLOW
CREAT SERPL-MCNC: 0.85 MG/DL (ref 0.57–1)
CREAT UR-MCNC: 17.4 MG/DL
EGFRCR SERPLBLD CKD-EPI 2021: 68 ML/MIN/1.73
EOSINOPHIL # BLD AUTO: 0 X10E3/UL (ref 0–0.4)
EOSINOPHIL NFR BLD AUTO: 1 %
ERYTHROCYTE [DISTWIDTH] IN BLOOD BY AUTOMATED COUNT: 14.2 % (ref 11.7–15.4)
GLOBULIN SER CALC-MCNC: 2.8 G/DL (ref 1.5–4.5)
GLUCOSE SERPL-MCNC: 135 MG/DL (ref 70–99)
GLUCOSE UR QL STRIP: ABNORMAL
HBA1C MFR BLD: 6.4 % (ref 4.8–5.6)
HCT VFR BLD AUTO: 42.7 % (ref 34–46.6)
HDLC SERPL-MCNC: 112 MG/DL
HGB BLD-MCNC: 14.3 G/DL (ref 11.1–15.9)
HGB UR QL STRIP: NEGATIVE
IMM GRANULOCYTES # BLD AUTO: 0 X10E3/UL (ref 0–0.1)
IMM GRANULOCYTES NFR BLD AUTO: 1 %
KETONES UR QL STRIP: NEGATIVE
LDLC SERPL CALC-MCNC: 43 MG/DL (ref 0–99)
LEUKOCYTE ESTERASE UR QL STRIP: NEGATIVE
LYMPHOCYTES # BLD AUTO: 2 X10E3/UL (ref 0.7–3.1)
LYMPHOCYTES NFR BLD AUTO: 33 %
MCH RBC QN AUTO: 28.7 PG (ref 26.6–33)
MCHC RBC AUTO-ENTMCNC: 33.5 G/DL (ref 31.5–35.7)
MCV RBC AUTO: 86 FL (ref 79–97)
MICRO URNS: ABNORMAL
MICROALBUMIN UR-MCNC: <3 UG/ML
MONOCYTES # BLD AUTO: 0.7 X10E3/UL (ref 0.1–0.9)
MONOCYTES NFR BLD AUTO: 11 %
NEUTROPHILS # BLD AUTO: 3.4 X10E3/UL (ref 1.4–7)
NEUTROPHILS NFR BLD AUTO: 53 %
NITRITE UR QL STRIP: NEGATIVE
PH UR STRIP: 6 [PH] (ref 5–7.5)
PLATELET # BLD AUTO: 371 X10E3/UL (ref 150–450)
POTASSIUM SERPL-SCNC: 4.5 MMOL/L (ref 3.5–5.2)
PROT SERPL-MCNC: 7.2 G/DL (ref 6–8.5)
PROT UR QL STRIP: NEGATIVE
RBC # BLD AUTO: 4.99 X10E6/UL (ref 3.77–5.28)
SODIUM SERPL-SCNC: 141 MMOL/L (ref 134–144)
SP GR UR STRIP: 1.01 (ref 1–1.03)
SPECIMEN STATUS REPORT: NORMAL
TRIGL SERPL-MCNC: 76 MG/DL (ref 0–149)
TSH SERPL DL<=0.005 MIU/L-ACNC: 1.43 UIU/ML (ref 0.45–4.5)
UROBILINOGEN UR STRIP-MCNC: 0.2 MG/DL (ref 0.2–1)
VLDLC SERPL CALC-MCNC: 14 MG/DL (ref 5–40)
WBC # BLD AUTO: 6.2 X10E3/UL (ref 3.4–10.8)

## 2024-11-20 ASSESSMENT — ENCOUNTER SYMPTOMS
ANAL BLEEDING: 0
NAUSEA: 0
COUGH: 0
SHORTNESS OF BREATH: 0
WHEEZING: 0
SORE THROAT: 0
ABDOMINAL PAIN: 0
DIARRHEA: 0
CONSTIPATION: 0
BLOOD IN STOOL: 0
VOMITING: 0

## 2024-11-21 ENCOUNTER — OFFICE VISIT (OUTPATIENT)
Dept: FAMILY MEDICINE CLINIC | Facility: CLINIC | Age: 84
End: 2024-11-21
Payer: MEDICARE

## 2024-11-21 VITALS
WEIGHT: 177 LBS | BODY MASS INDEX: 33.44 KG/M2 | RESPIRATION RATE: 16 BRPM | HEART RATE: 69 BPM | DIASTOLIC BLOOD PRESSURE: 60 MMHG | TEMPERATURE: 98.1 F | SYSTOLIC BLOOD PRESSURE: 120 MMHG | OXYGEN SATURATION: 97 %

## 2024-11-21 DIAGNOSIS — F32.9 MAJOR DEPRESSIVE DISORDER WITH SINGLE EPISODE, REMISSION STATUS UNSPECIFIED: ICD-10-CM

## 2024-11-21 DIAGNOSIS — E11.21 TYPE 2 DIABETES MELLITUS WITH DIABETIC NEPHROPATHY, WITHOUT LONG-TERM CURRENT USE OF INSULIN (HCC): Primary | ICD-10-CM

## 2024-11-21 DIAGNOSIS — I10 ESSENTIAL HYPERTENSION: ICD-10-CM

## 2024-11-21 DIAGNOSIS — Z00.00 MEDICARE ANNUAL WELLNESS VISIT, SUBSEQUENT: ICD-10-CM

## 2024-11-21 DIAGNOSIS — N28.9 MILD RENAL INSUFFICIENCY: ICD-10-CM

## 2024-11-21 DIAGNOSIS — M48.061 SPINAL STENOSIS OF LUMBAR REGION, UNSPECIFIED WHETHER NEUROGENIC CLAUDICATION PRESENT: ICD-10-CM

## 2024-11-21 PROCEDURE — 1123F ACP DISCUSS/DSCN MKR DOCD: CPT | Performed by: FAMILY MEDICINE

## 2024-11-21 PROCEDURE — 1126F AMNT PAIN NOTED NONE PRSNT: CPT | Performed by: FAMILY MEDICINE

## 2024-11-21 PROCEDURE — 1159F MED LIST DOCD IN RCRD: CPT | Performed by: FAMILY MEDICINE

## 2024-11-21 PROCEDURE — 1160F RVW MEDS BY RX/DR IN RCRD: CPT | Performed by: FAMILY MEDICINE

## 2024-11-21 PROCEDURE — 3078F DIAST BP <80 MM HG: CPT | Performed by: FAMILY MEDICINE

## 2024-11-21 PROCEDURE — 3044F HG A1C LEVEL LT 7.0%: CPT | Performed by: FAMILY MEDICINE

## 2024-11-21 PROCEDURE — 3074F SYST BP LT 130 MM HG: CPT | Performed by: FAMILY MEDICINE

## 2024-11-21 PROCEDURE — G0439 PPPS, SUBSEQ VISIT: HCPCS | Performed by: FAMILY MEDICINE

## 2024-11-21 ASSESSMENT — PATIENT HEALTH QUESTIONNAIRE - PHQ9
1. LITTLE INTEREST OR PLEASURE IN DOING THINGS: NOT AT ALL
3. TROUBLE FALLING OR STAYING ASLEEP: NOT AT ALL
2. FEELING DOWN, DEPRESSED OR HOPELESS: NOT AT ALL
8. MOVING OR SPEAKING SO SLOWLY THAT OTHER PEOPLE COULD HAVE NOTICED. OR THE OPPOSITE, BEING SO FIGETY OR RESTLESS THAT YOU HAVE BEEN MOVING AROUND A LOT MORE THAN USUAL: NOT AT ALL
4. FEELING TIRED OR HAVING LITTLE ENERGY: NOT AT ALL
SUM OF ALL RESPONSES TO PHQ QUESTIONS 1-9: 0
7. TROUBLE CONCENTRATING ON THINGS, SUCH AS READING THE NEWSPAPER OR WATCHING TELEVISION: NOT AT ALL
SUM OF ALL RESPONSES TO PHQ9 QUESTIONS 1 & 2: 0
SUM OF ALL RESPONSES TO PHQ QUESTIONS 1-9: 0
6. FEELING BAD ABOUT YOURSELF - OR THAT YOU ARE A FAILURE OR HAVE LET YOURSELF OR YOUR FAMILY DOWN: NOT AT ALL
10. IF YOU CHECKED OFF ANY PROBLEMS, HOW DIFFICULT HAVE THESE PROBLEMS MADE IT FOR YOU TO DO YOUR WORK, TAKE CARE OF THINGS AT HOME, OR GET ALONG WITH OTHER PEOPLE: NOT DIFFICULT AT ALL
9. THOUGHTS THAT YOU WOULD BE BETTER OFF DEAD, OR OF HURTING YOURSELF: NOT AT ALL
SUM OF ALL RESPONSES TO PHQ QUESTIONS 1-9: 0
5. POOR APPETITE OR OVEREATING: NOT AT ALL
SUM OF ALL RESPONSES TO PHQ QUESTIONS 1-9: 0

## 2024-11-21 ASSESSMENT — ENCOUNTER SYMPTOMS: BACK PAIN: 1

## 2024-11-21 NOTE — PROGRESS NOTES
Liliam Crawford is a 84 y.o. female (: 1940) presenting to address:    Chief Complaint   Patient presents with    Medicare AWV       Vitals:    24 1405   BP: 120/60   Pulse: 69   Resp: 16   Temp: 98.1 °F (36.7 °C)   SpO2: 97%       \"Have you been to the ER, urgent care clinic since your last visit?  Hospitalized since your last visit?\"    NO    “Have you seen or consulted any other health care providers outside of [facility] since your last visit?”    NO             
mouth daily Yes Bird Page MD   losartan (COZAAR) 50 MG tablet Take 1 tablet by mouth daily Yes Bird Page MD   nadolol (CORGARD) 40 MG tablet Take 1 tablet by mouth daily Yes Bird Page MD   pravastatin (PRAVACHOL) 40 MG tablet Take 1 tablet by mouth nightly Yes Bird Page MD   valACYclovir (VALTREX) 500 MG tablet Take 1 tablet by mouth 2 times daily Yes Bird Page MD   dulaglutide (TRULICITY) 1.5 MG/0.5ML SC injection Inject 0.5 mLs into the skin once a week Yes Bird Page MD   ibuprofen (ADVIL) 200 MG tablet Take 2 tablets by mouth every 6 hours as needed for Pain or Fever Yes Bird Page MD   acetaminophen (TYLENOL) 500 MG tablet Take 2 tablets by mouth every 6 hours as needed for Pain or Fever Yes Bird Page MD   AUSTEDO 9 MG tablet Take 1 tablet by mouth daily Yes Provider, MD Buck   OLANZapine (ZYPREXA) 5 MG tablet Take 1 tablet by mouth 2 times daily Yes Automatic Reconciliation, Ar       CareTeam (Including outside providers/suppliers regularly involved in providing care):   Patient Care Team:  Bird Page MD as PCP - General  Bird Page MD as PCP - Empaneled Provider  Ramon Rodríguez MD as Consulting Physician  Rhonda Swan MD as Consulting Physician      Reviewed and updated this visit:  Tobacco  Allergies  Meds  Med Hx  Surg Hx  Soc Hx  Fam Hx

## 2024-11-21 NOTE — PATIENT INSTRUCTIONS
best.  Start with 5 minutes of an activity you enjoy. Prove to yourself you can do it. As you get comfortable, increase your time.  You may not be where you want to be. But you're in the process of getting there. Everyone starts somewhere.  How can you find safe ways to stay active?  Talk with your doctor about any physical challenges you're facing. Make a plan with your doctor if you have a health problem or aren't sure how to get started with activity.  If you're already active, ask your doctor if there is anything you should change to stay safe as your body and health change.  If you tend to feel dizzy after you take medicine, avoid activity at that time. Try being active before you take your medicine. This will reduce your risk of falls.  If you plan to be active at home, make sure to clear your space before you get started. Remove things like TV cords, coffee tables, and throw rugs. It's safest to have plenty of space to move freely.  The key to getting more active is to take it slow and steady. Try to improve only a little bit at a time. Pick just one area to improve on at first. And if an activity hurts, stop and talk to your doctor.  Where can you learn more?  Go to https://www.[employer].net/patientEd and enter P600 to learn more about \"Learning About Being Active as an Older Adult.\"  Current as of: June 5, 2023  Content Version: 14.2  © 2024 [employer].   Care instructions adapted under license by [employer]. If you have questions about a medical condition or this instruction, always ask your healthcare professional. [employer] disclaims any warranty or liability for your use of this information.           Starting a Weight Loss Plan: Care Instructions  Overview     It can be a challenge to lose weight. But your doctor can help you make a weight-loss plan that meets your needs.  You don't have to make a lot of big changes at once. A better idea might be to focus on small changes

## 2024-12-16 ENCOUNTER — TELEPHONE (OUTPATIENT)
Dept: FAMILY MEDICINE CLINIC | Facility: CLINIC | Age: 84
End: 2024-12-16

## 2024-12-16 DIAGNOSIS — L21.9 SEBORRHEIC DERMATITIS OF SCALP: Primary | ICD-10-CM

## 2024-12-16 NOTE — TELEPHONE ENCOUNTER
Pt called stating her ketoconazole has not been working for her and she is looking to get an alternative.

## 2025-02-14 ENCOUNTER — TELEPHONE (OUTPATIENT)
Dept: FAMILY MEDICINE CLINIC | Facility: CLINIC | Age: 85
End: 2025-02-14

## 2025-02-14 NOTE — TELEPHONE ENCOUNTER
Patient called stating that she is cancelling her medication Trulicity. Patient stated that the medication is to expensive. FAHAD

## 2025-02-24 ENCOUNTER — TELEPHONE (OUTPATIENT)
Dept: FAMILY MEDICINE CLINIC | Facility: CLINIC | Age: 85
End: 2025-02-24

## 2025-02-24 NOTE — TELEPHONE ENCOUNTER
PT called and would like for medication canceled Trulicity. And have the cancellation request sent to pharmacy fax.     Fax number [phone].

## 2025-02-27 ENCOUNTER — TELEPHONE (OUTPATIENT)
Dept: FAMILY MEDICINE CLINIC | Facility: CLINIC | Age: 85
End: 2025-02-27

## 2025-02-27 NOTE — TELEPHONE ENCOUNTER
Pt called stating her assisted living facility [facility] needs an order faxed to [phone] confirming her Trulicity has been discontinued. They cannot stop until order is received.

## 2025-02-27 NOTE — TELEPHONE ENCOUNTER
Can you write an order to discontinue Trulicity I faxed them the medication order that was discontinued in the system that is not sufficient for them.

## 2025-04-14 ENCOUNTER — TELEPHONE (OUTPATIENT)
Dept: FAMILY MEDICINE CLINIC | Facility: CLINIC | Age: 85
End: 2025-04-14

## 2025-04-14 DIAGNOSIS — L21.9 SEBORRHEIC DERMATITIS OF SCALP: Primary | ICD-10-CM

## 2025-04-14 DIAGNOSIS — L21.9 SEBORRHEIC DERMATITIS OF SCALP: ICD-10-CM

## 2025-04-14 RX ORDER — KETOCONAZOLE 20 MG/ML
SHAMPOO, SUSPENSION TOPICAL
Qty: 120 ML | Refills: 5 | Status: CANCELLED | OUTPATIENT
Start: 2025-04-14

## 2025-04-14 NOTE — TELEPHONE ENCOUNTER
Pt called requesting a new prescription. She does not believe the NIZORAL shampoo she was prescribed is helping her scalp infection.

## 2025-04-14 NOTE — TELEPHONE ENCOUNTER
Patient called to follow up on previous encounter. Informed patient of message per pcp. Patient number has been updated in her chart. Patient verbally understood that she has been referred to dermatology

## 2025-04-14 NOTE — TELEPHONE ENCOUNTER
Both numbers on file are not in service. I tried daughter's number no answer and voicemail box is full.

## 2025-04-14 NOTE — TELEPHONE ENCOUNTER
Last refilled 10/2/24 for 120 ml with 5 refills. Last ov 11/21/24   Future Appointments   Date Time Provider Department Center   5/21/2025  2:00 PM [facility]

## 2025-05-07 PROBLEM — M48.061 SPINAL STENOSIS OF LUMBAR REGION: Status: ACTIVE | Noted: 2025-05-07

## 2025-05-07 ASSESSMENT — ENCOUNTER SYMPTOMS
CHEST TIGHTNESS: 0
SHORTNESS OF BREATH: 0

## 2025-05-08 ENCOUNTER — TELEPHONE (OUTPATIENT)
Dept: FAMILY MEDICINE CLINIC | Facility: CLINIC | Age: 85
End: 2025-05-08

## 2025-05-08 NOTE — TELEPHONE ENCOUNTER
Patient notified that she doesn't need a fasting glucose we are doing an A1c which measures they average blood sugars over the last 3 months. She voiced understanding.

## 2025-05-08 NOTE — TELEPHONE ENCOUNTER
Pt called stating she needed a fasting blood sugar lab down prior to her appt she scheduled for tomorrow. Advised he would do the testing at her visit but she is requesting a call from the clinical staff since she would like to get it done today.

## 2025-05-09 ENCOUNTER — TELEPHONE (OUTPATIENT)
Dept: FAMILY MEDICINE CLINIC | Facility: CLINIC | Age: 85
End: 2025-05-09

## 2025-05-09 ENCOUNTER — OFFICE VISIT (OUTPATIENT)
Dept: FAMILY MEDICINE CLINIC | Facility: CLINIC | Age: 85
End: 2025-05-09
Payer: MEDICARE

## 2025-05-09 VITALS
WEIGHT: 182 LBS | RESPIRATION RATE: 16 BRPM | HEIGHT: 61 IN | BODY MASS INDEX: 34.36 KG/M2 | TEMPERATURE: 98.4 F | SYSTOLIC BLOOD PRESSURE: 120 MMHG | OXYGEN SATURATION: 97 % | HEART RATE: 72 BPM | DIASTOLIC BLOOD PRESSURE: 60 MMHG

## 2025-05-09 DIAGNOSIS — L21.9 SEBORRHEIC DERMATITIS OF SCALP: ICD-10-CM

## 2025-05-09 DIAGNOSIS — I10 ESSENTIAL HYPERTENSION: ICD-10-CM

## 2025-05-09 DIAGNOSIS — M48.061 SPINAL STENOSIS OF LUMBAR REGION, UNSPECIFIED WHETHER NEUROGENIC CLAUDICATION PRESENT: ICD-10-CM

## 2025-05-09 DIAGNOSIS — N28.9 MILD RENAL INSUFFICIENCY: ICD-10-CM

## 2025-05-09 DIAGNOSIS — F32.9 MAJOR DEPRESSIVE DISORDER WITH SINGLE EPISODE, REMISSION STATUS UNSPECIFIED: ICD-10-CM

## 2025-05-09 DIAGNOSIS — E11.21 TYPE 2 DIABETES MELLITUS WITH DIABETIC NEPHROPATHY, WITHOUT LONG-TERM CURRENT USE OF INSULIN (HCC): Primary | ICD-10-CM

## 2025-05-09 LAB — HBA1C MFR BLD: 6.8 %

## 2025-05-09 PROCEDURE — 1123F ACP DISCUSS/DSCN MKR DOCD: CPT | Performed by: FAMILY MEDICINE

## 2025-05-09 PROCEDURE — 3078F DIAST BP <80 MM HG: CPT | Performed by: FAMILY MEDICINE

## 2025-05-09 PROCEDURE — 1160F RVW MEDS BY RX/DR IN RCRD: CPT | Performed by: FAMILY MEDICINE

## 2025-05-09 PROCEDURE — 99214 OFFICE O/P EST MOD 30 MIN: CPT | Performed by: FAMILY MEDICINE

## 2025-05-09 PROCEDURE — 1159F MED LIST DOCD IN RCRD: CPT | Performed by: FAMILY MEDICINE

## 2025-05-09 PROCEDURE — 3074F SYST BP LT 130 MM HG: CPT | Performed by: FAMILY MEDICINE

## 2025-05-09 PROCEDURE — 83036 HEMOGLOBIN GLYCOSYLATED A1C: CPT | Performed by: FAMILY MEDICINE

## 2025-05-09 PROCEDURE — 1126F AMNT PAIN NOTED NONE PRSNT: CPT | Performed by: FAMILY MEDICINE

## 2025-05-09 PROCEDURE — 3044F HG A1C LEVEL LT 7.0%: CPT | Performed by: FAMILY MEDICINE

## 2025-05-09 RX ORDER — CLOBETASOL PROPIONATE 0.5 MG/ML
SOLUTION TOPICAL
Qty: 50 ML | Refills: 5 | Status: SHIPPED | OUTPATIENT
Start: 2025-05-09 | End: 2025-05-09 | Stop reason: SDUPTHER

## 2025-05-09 RX ORDER — CLOBETASOL PROPIONATE 0.5 MG/ML
SOLUTION TOPICAL
Qty: 50 ML | Refills: 5 | Status: SHIPPED | OUTPATIENT
Start: 2025-05-09

## 2025-05-09 SDOH — ECONOMIC STABILITY: FOOD INSECURITY: WITHIN THE PAST 12 MONTHS, THE FOOD YOU BOUGHT JUST DIDN'T LAST AND YOU DIDN'T HAVE MONEY TO GET MORE.: NEVER TRUE

## 2025-05-09 SDOH — ECONOMIC STABILITY: FOOD INSECURITY: WITHIN THE PAST 12 MONTHS, YOU WORRIED THAT YOUR FOOD WOULD RUN OUT BEFORE YOU GOT MONEY TO BUY MORE.: NEVER TRUE

## 2025-05-09 ASSESSMENT — PATIENT HEALTH QUESTIONNAIRE - PHQ9
2. FEELING DOWN, DEPRESSED OR HOPELESS: NOT AT ALL
3. TROUBLE FALLING OR STAYING ASLEEP: NOT AT ALL
1. LITTLE INTEREST OR PLEASURE IN DOING THINGS: NOT AT ALL
10. IF YOU CHECKED OFF ANY PROBLEMS, HOW DIFFICULT HAVE THESE PROBLEMS MADE IT FOR YOU TO DO YOUR WORK, TAKE CARE OF THINGS AT HOME, OR GET ALONG WITH OTHER PEOPLE: NOT DIFFICULT AT ALL
SUM OF ALL RESPONSES TO PHQ QUESTIONS 1-9: 0
SUM OF ALL RESPONSES TO PHQ QUESTIONS 1-9: 0
6. FEELING BAD ABOUT YOURSELF - OR THAT YOU ARE A FAILURE OR HAVE LET YOURSELF OR YOUR FAMILY DOWN: NOT AT ALL
4. FEELING TIRED OR HAVING LITTLE ENERGY: NOT AT ALL
SUM OF ALL RESPONSES TO PHQ QUESTIONS 1-9: 0
5. POOR APPETITE OR OVEREATING: NOT AT ALL
9. THOUGHTS THAT YOU WOULD BE BETTER OFF DEAD, OR OF HURTING YOURSELF: NOT AT ALL
SUM OF ALL RESPONSES TO PHQ QUESTIONS 1-9: 0
7. TROUBLE CONCENTRATING ON THINGS, SUCH AS READING THE NEWSPAPER OR WATCHING TELEVISION: NOT AT ALL
8. MOVING OR SPEAKING SO SLOWLY THAT OTHER PEOPLE COULD HAVE NOTICED. OR THE OPPOSITE, BEING SO FIGETY OR RESTLESS THAT YOU HAVE BEEN MOVING AROUND A LOT MORE THAN USUAL: NOT AT ALL

## 2025-05-09 NOTE — TELEPHONE ENCOUNTER
PT called stated that the assisting living facility  need a note/script fax over for Semaglutide. But she didn't provide me with a fax number or phone number. She then stated that they are faxing over a paperwork for Dr Page to sign.

## 2025-05-09 NOTE — TELEPHONE ENCOUNTER
I spoke with facility they need a prescription for both medications for patient to be able to give them to her faxed to [phone]. Dr Page made aware.

## 2025-05-09 NOTE — TELEPHONE ENCOUNTER
Spoke with patient she stated that she has picked up medication from pharmacy and the facility in which she live will not administer medication to her. Patient stated that the scripts has to be sent to the facility. Patient gave fax number [phone]. No other information

## 2025-05-09 NOTE — PATIENT INSTRUCTIONS
Current Status:  Type 2 diabetes remains well-controlled with a hemoglobin A1c of 6.8%  Hypertension well-controlled  Mild renal insufficiency with an estimated glomerular filtration rate of 68 as of 11/14/2024  Chronic low back pain with lumbar spinal stenosis symptoms stable  Major depressive disorder, followed by psychiatry-symptoms stable    Health Maintenance Recommendations:  Influenza immunization every fall  COVID-19 immunization booster, RSV immunization-available at the pharmacy    Plan:  Begin semaglutide (Ozempic) 0.25 mg by subcutaneous injection every 7 days  Return for follow-up in 1 month  Clobetasol solution, apply to scalp twice daily  Monitor blood pressure carefully and report persistent elevation. Avoid salt and nephrotoxic substances such as NSAIDs(iuprofen, motrin, Advil, naproxen, Aleve). Tylenol (acetaminophen is safe to take.  Avoid dietary starch and sugar and as much as possible follow program of regular aerobic exercise.  clobetasol propionate gel 0.05% apply to scalp twice daily  Continue current medications  Please schedule lab appointment followed by Medicare wellness evaluation appointment after 11/21/2025, return sooner with any problems  Please always arrive at least 15 minutes before your scheduled appointment time.

## 2025-05-09 NOTE — PROGRESS NOTES
Liliam Crawford is a 84 y.o. female (: 1940) presenting to address:    Chief Complaint   Patient presents with    Diabetes    Discuss Medications     To wagovy or ozempic        Vitals:    25 1048   BP: 120/60   Pulse: 72   Resp: 16   Temp: 98.4 °F (36.9 °C)   SpO2: 97%       \"Have you been to the ER, urgent care clinic since your last visit?  Hospitalized since your last visit?\"    NO    “Have you seen or consulted any other health care providers outside of [facility] since your last visit?”    NO             
1 tablet by mouth nightly, Disp: 90 tablet, Rfl: 3    valACYclovir (VALTREX) 500 MG tablet, Take 1 tablet by mouth 2 times daily, Disp: 180 tablet, Rfl: 3    ibuprofen (ADVIL) 200 MG tablet, Take 2 tablets by mouth every 6 hours as needed for Pain or Fever, Disp: 120 tablet, Rfl: 5    acetaminophen (TYLENOL) 500 MG tablet, Take 2 tablets by mouth every 6 hours as needed for Pain or Fever, Disp: 120 tablet, Rfl: 5    AUSTEDO 9 MG tablet, Take 1 tablet by mouth daily, Disp: , Rfl:     OLANZapine (ZYPREXA) 5 MG tablet, Take 1 tablet by mouth 2 times daily, Disp: , Rfl:       Review of Systems   Constitutional:  Negative for appetite change, fever and unexpected weight change.   Eyes:  Negative for visual disturbance.   Respiratory:  Negative for chest tightness and shortness of breath.    Cardiovascular:  Negative for chest pain, palpitations and leg swelling.   Endocrine: Negative for polydipsia and polyuria.   Neurological:  Negative for dizziness, speech difficulty, light-headedness, numbness and headaches.   Psychiatric/Behavioral:  Negative for confusion.        /60   Pulse 72   Temp 98.4 °F (36.9 °C) (Temporal)   Resp 16   Ht 1.549 m (5' 1\")   Wt 82.6 kg (182 lb)   SpO2 97%   BMI 34.39 kg/m²     Physical Exam  Vitals and nursing note reviewed.   Constitutional:       General: She is not in acute distress.     Appearance: Normal appearance. She is not ill-appearing.   HENT:      Head: Normocephalic.   Eyes:      Extraocular Movements: Extraocular movements intact.   Cardiovascular:      Rate and Rhythm: Normal rate.   Pulmonary:      Effort: Pulmonary effort is normal.   Neurological:      Mental Status: She is alert.   Psychiatric:         Mood and Affect: Mood normal.         Behavior: Behavior normal.          ASSESSMENT and PLAN    1. Type 2 diabetes mellitus with diabetic nephropathy, without long-term current use of insulin (HCC)  -     AMB POC HEMOGLOBIN A1C  -     Semaglutide,0.25 or

## 2025-05-13 ENCOUNTER — TELEPHONE (OUTPATIENT)
Dept: FAMILY MEDICINE CLINIC | Facility: CLINIC | Age: 85
End: 2025-05-13

## 2025-05-13 NOTE — TELEPHONE ENCOUNTER
PT called wanting to know if she needs her sugar check and a urine lab order before her next appt

## 2025-05-14 ENCOUNTER — TELEPHONE (OUTPATIENT)
Dept: FAMILY MEDICINE CLINIC | Facility: CLINIC | Age: 85
End: 2025-05-14

## 2025-05-14 NOTE — TELEPHONE ENCOUNTER
PT called and wanted to know if Dr. Page can  send in a one time script of Austedo 25mg once a day to the pharmacy because the provider office is temporary closed.

## 2025-05-14 NOTE — TELEPHONE ENCOUNTER
I spoke with patient she states the Dr is no longer working at the practice and they can't prescribe it for her. Patient is upset stating she has been taking it for a year and needs it. Also says it has to be called into a specialty pharmacy [facility]

## 2025-05-15 ENCOUNTER — TELEPHONE (OUTPATIENT)
Dept: FAMILY MEDICINE CLINIC | Facility: CLINIC | Age: 85
End: 2025-05-15

## 2025-05-15 DIAGNOSIS — T43.505A NEUROLEPTIC-INDUCED TARDIVE DYSKINESIA: Primary | ICD-10-CM

## 2025-05-15 DIAGNOSIS — G24.01 NEUROLEPTIC-INDUCED TARDIVE DYSKINESIA: Primary | ICD-10-CM

## 2025-05-15 RX ORDER — DEUTETRABENAZINE 24 MG/1
24 TABLET, FILM COATED, EXTENDED RELEASE ORAL DAILY
COMMUNITY
Start: 2025-04-07 | End: 2025-05-15 | Stop reason: SDUPTHER

## 2025-05-15 NOTE — TELEPHONE ENCOUNTER
I was able to find the medication in out side meds and the pharmacy info the medication is a 24 mg XR tablet . I also updated the pharmacy for this medication.

## 2025-05-15 NOTE — TELEPHONE ENCOUNTER
Patient notified she would need to find a new psych Dr to prescribe a new medication since her insurance denied coverage for this medication the speciality pharmacy said the prior authorization was done but denied and an appeal would have to be done which we can not do. Would suggest finding a new psych office to establish care and she if there is another medication that can be given to replace Austedo.

## 2025-05-15 NOTE — TELEPHONE ENCOUNTER
There is no 25 mg Austedo.  The closest I can find is an extended release 24 mg tablet.  I would also have to have the specialty pharmacy added in order to send it.  It would be very helpful if she could bring by the prescription bottle for us to look at.

## 2025-05-15 NOTE — TELEPHONE ENCOUNTER
[facility] pharmacy called stating they received AUSTEDO script from Dr. Page but an appeal would need to be filed. Pharmacist stated it appears a prior auth was initiated by her previous prescriber so an appeal would need to be done in order for pt to fill it.

## 2025-05-22 RX ORDER — LOSARTAN POTASSIUM 50 MG/1
50 TABLET ORAL DAILY
Qty: 90 TABLET | Refills: 3 | Status: SHIPPED | OUTPATIENT
Start: 2025-05-22

## 2025-05-22 RX ORDER — PRAVASTATIN SODIUM 40 MG
40 TABLET ORAL NIGHTLY
Qty: 90 TABLET | Refills: 3 | Status: SHIPPED | OUTPATIENT
Start: 2025-05-22

## 2025-05-22 RX ORDER — VALACYCLOVIR HYDROCHLORIDE 500 MG/1
500 TABLET, FILM COATED ORAL 2 TIMES DAILY
Qty: 180 TABLET | Refills: 3 | Status: SHIPPED | OUTPATIENT
Start: 2025-05-22

## 2025-05-22 RX ORDER — NADOLOL 40 MG/1
40 TABLET ORAL DAILY
Qty: 90 TABLET | Refills: 3 | Status: SHIPPED | OUTPATIENT
Start: 2025-05-22

## 2025-05-22 RX ORDER — FELODIPINE 5 MG/1
5 TABLET, EXTENDED RELEASE ORAL DAILY
Qty: 90 TABLET | Refills: 3 | Status: SHIPPED | OUTPATIENT
Start: 2025-05-22

## 2025-05-22 NOTE — TELEPHONE ENCOUNTER
Nadolol, Pravastatin,Losartan , Felodipine Last refilled 6/11/24 for 90 with 3 refills.last ov 5/9/25   Future Appointments   Date Time Provider Department Center   6/12/2025 11:00 AM Bird Page MD [facility] DEP

## 2025-06-02 ENCOUNTER — TELEPHONE (OUTPATIENT)
Dept: FAMILY MEDICINE CLINIC | Facility: CLINIC | Age: 85
End: 2025-06-02

## 2025-06-02 DIAGNOSIS — E11.21 TYPE 2 DIABETES MELLITUS WITH DIABETIC NEPHROPATHY, WITHOUT LONG-TERM CURRENT USE OF INSULIN (HCC): ICD-10-CM

## 2025-06-02 NOTE — TELEPHONE ENCOUNTER
I called patient she has called 4 times since this morning. The 2nd or 3 time she called I told the  to let her know we received her paperwork and would get it back to the facility which has been done. I explained to patient that she can not keep calling and harassing the  about her paperwork and needs to let the nursing staff at her facility do their job it was sent on Sunday and we are not open on the weekends so Monday is a very busy day and we will get back to her when we can . Patient apologized and asked what was on the paperwork. I told her it was a list of her medication she said which one I told her all of her medications and we have sent the form back to her facility she voiced understandings.

## 2025-06-02 NOTE — TELEPHONE ENCOUNTER
Patient needs medication list for medicare. Patient stated that she has a lot of medication that she takes and Dr Page has to sign off on them. Patient would like a call back

## 2025-06-05 ENCOUNTER — TELEPHONE (OUTPATIENT)
Dept: FAMILY MEDICINE CLINIC | Facility: CLINIC | Age: 85
End: 2025-06-05

## 2025-06-05 NOTE — TELEPHONE ENCOUNTER
Patient called stating that she needs an Ortho referral. Current specialist is not in network with her insurance. Please advise

## 2025-06-11 PROBLEM — L21.9 SEBORRHEIC DERMATITIS OF SCALP: Status: ACTIVE | Noted: 2025-06-11

## 2025-06-11 ASSESSMENT — ENCOUNTER SYMPTOMS
CHEST TIGHTNESS: 0
SHORTNESS OF BREATH: 0

## 2025-06-12 ENCOUNTER — TELEPHONE (OUTPATIENT)
Dept: FAMILY MEDICINE CLINIC | Facility: CLINIC | Age: 85
End: 2025-06-12

## 2025-06-12 ENCOUNTER — OFFICE VISIT (OUTPATIENT)
Dept: FAMILY MEDICINE CLINIC | Facility: CLINIC | Age: 85
End: 2025-06-12
Payer: MEDICARE

## 2025-06-12 VITALS
TEMPERATURE: 98.2 F | HEIGHT: 61 IN | OXYGEN SATURATION: 96 % | WEIGHT: 180 LBS | RESPIRATION RATE: 16 BRPM | BODY MASS INDEX: 33.99 KG/M2 | SYSTOLIC BLOOD PRESSURE: 120 MMHG | DIASTOLIC BLOOD PRESSURE: 80 MMHG | HEART RATE: 90 BPM

## 2025-06-12 DIAGNOSIS — I10 ESSENTIAL HYPERTENSION: ICD-10-CM

## 2025-06-12 DIAGNOSIS — E11.21 TYPE 2 DIABETES MELLITUS WITH DIABETIC NEPHROPATHY, WITHOUT LONG-TERM CURRENT USE OF INSULIN (HCC): Primary | ICD-10-CM

## 2025-06-12 DIAGNOSIS — F32.4 MAJOR DEPRESSIVE DISORDER WITH SINGLE EPISODE, IN PARTIAL REMISSION: ICD-10-CM

## 2025-06-12 DIAGNOSIS — I10 ESSENTIAL HYPERTENSION: Primary | ICD-10-CM

## 2025-06-12 DIAGNOSIS — T43.505A NEUROLEPTIC-INDUCED TARDIVE DYSKINESIA: ICD-10-CM

## 2025-06-12 DIAGNOSIS — K21.9 GASTROESOPHAGEAL REFLUX DISEASE WITHOUT ESOPHAGITIS: ICD-10-CM

## 2025-06-12 DIAGNOSIS — F32.9 CURRENT EPISODE OF MAJOR DEPRESSIVE DISORDER WITHOUT PRIOR EPISODE, UNSPECIFIED DEPRESSION EPISODE SEVERITY: ICD-10-CM

## 2025-06-12 DIAGNOSIS — L21.9 SEBORRHEIC DERMATITIS OF SCALP: ICD-10-CM

## 2025-06-12 DIAGNOSIS — E11.21 TYPE 2 DIABETES MELLITUS WITH DIABETIC NEPHROPATHY, WITHOUT LONG-TERM CURRENT USE OF INSULIN (HCC): ICD-10-CM

## 2025-06-12 DIAGNOSIS — G24.01 NEUROLEPTIC-INDUCED TARDIVE DYSKINESIA: ICD-10-CM

## 2025-06-12 PROCEDURE — 3079F DIAST BP 80-89 MM HG: CPT | Performed by: FAMILY MEDICINE

## 2025-06-12 PROCEDURE — 1160F RVW MEDS BY RX/DR IN RCRD: CPT | Performed by: FAMILY MEDICINE

## 2025-06-12 PROCEDURE — 1126F AMNT PAIN NOTED NONE PRSNT: CPT | Performed by: FAMILY MEDICINE

## 2025-06-12 PROCEDURE — 1123F ACP DISCUSS/DSCN MKR DOCD: CPT | Performed by: FAMILY MEDICINE

## 2025-06-12 PROCEDURE — 3044F HG A1C LEVEL LT 7.0%: CPT | Performed by: FAMILY MEDICINE

## 2025-06-12 PROCEDURE — 3074F SYST BP LT 130 MM HG: CPT | Performed by: FAMILY MEDICINE

## 2025-06-12 PROCEDURE — 99214 OFFICE O/P EST MOD 30 MIN: CPT | Performed by: FAMILY MEDICINE

## 2025-06-12 PROCEDURE — 1159F MED LIST DOCD IN RCRD: CPT | Performed by: FAMILY MEDICINE

## 2025-06-12 RX ORDER — CLOBETASOL PROPIONATE 0.5 MG/ML
SOLUTION TOPICAL
Qty: 50 ML | Refills: 5 | Status: SHIPPED | OUTPATIENT
Start: 2025-06-12

## 2025-06-12 NOTE — PROGRESS NOTES
Liliam Crawford is a 84 y.o. female (: 1940) presenting to address:    Chief Complaint   Patient presents with    Diabetes       Vitals:    25 1042   BP: 120/80   Pulse: 90   Resp: 16   Temp: 98.2 °F (36.8 °C)   SpO2: 96%       \"Have you been to the ER, urgent care clinic since your last visit?  Hospitalized since your last visit?\"    NO    “Have you seen or consulted any other health care providers outside of [facility] since your last visit?”    NO             
subcutaneous injection  Please schedule lab appointment followed by Medicare wellness evaluation appointment after 11/21/2025, return sooner with any problems    Bird Page MD    Please Note:  This document has been produced using voice recognition software.  Unrecognized errors in transcription may be present.

## 2025-06-12 NOTE — TELEPHONE ENCOUNTER
Pt's home care called stating they need an order faxed to [phone] so they can administer pt's ozempic new dosage as discussed at her visit.

## 2025-06-12 NOTE — PATIENT INSTRUCTIONS
Current Status:  Tolerating semaglutide well, agreeable to an increase in dose  Hypertension well-controlled  Scalp seborrheic dermatitis symptoms responding to clobetasol solution  Patient is now able to receive her Austedo and Zyprexa prescriptions ordered by the physician at her assisted living facility    Health Maintenance Recommendations:  Influenza immunization every fall  COVID-19 immunization, RSV immunization-available at the pharmacy    Plan:  Continue current medications but increase semaglutide to 0.5 mg by weekly subcutaneous injection  Please schedule lab appointment followed by Medicare wellness evaluation appointment after 11/21/2025, return sooner with any problems

## 2025-06-13 DIAGNOSIS — E11.21 TYPE 2 DIABETES MELLITUS WITH DIABETIC NEPHROPATHY, WITHOUT LONG-TERM CURRENT USE OF INSULIN (HCC): ICD-10-CM

## 2025-07-21 DIAGNOSIS — E11.21 TYPE 2 DIABETES MELLITUS WITH DIABETIC NEPHROPATHY, WITHOUT LONG-TERM CURRENT USE OF INSULIN (HCC): ICD-10-CM

## 2025-07-22 DIAGNOSIS — L21.9 SEBORRHEIC DERMATITIS OF SCALP: ICD-10-CM

## 2025-07-23 DIAGNOSIS — E11.21 TYPE 2 DIABETES MELLITUS WITH DIABETIC NEPHROPATHY, WITHOUT LONG-TERM CURRENT USE OF INSULIN (HCC): ICD-10-CM
